# Patient Record
Sex: FEMALE | Race: WHITE | Employment: UNEMPLOYED | URBAN - METROPOLITAN AREA
[De-identification: names, ages, dates, MRNs, and addresses within clinical notes are randomized per-mention and may not be internally consistent; named-entity substitution may affect disease eponyms.]

---

## 2017-05-24 ENCOUNTER — APPOINTMENT (OUTPATIENT)
Dept: GENERAL RADIOLOGY | Age: 62
End: 2017-05-24

## 2017-05-24 ENCOUNTER — HOSPITAL ENCOUNTER (EMERGENCY)
Age: 62
Discharge: HOME | End: 2017-05-24
Attending: EMERGENCY MEDICINE

## 2017-05-24 VITALS
HEART RATE: 70 BPM | DIASTOLIC BLOOD PRESSURE: 93 MMHG | HEIGHT: 66 IN | SYSTOLIC BLOOD PRESSURE: 202 MMHG | RESPIRATION RATE: 18 BRPM | OXYGEN SATURATION: 98 % | WEIGHT: 195 LBS | BODY MASS INDEX: 31.34 KG/M2 | TEMPERATURE: 98.5 F

## 2017-05-24 DIAGNOSIS — S29.011A CHEST WALL MUSCLE STRAIN, INITIAL ENCOUNTER: Primary | ICD-10-CM

## 2017-05-24 PROCEDURE — 71020 XR CHEST STANDARD TWO VW: CPT

## 2017-05-24 PROCEDURE — 99285 EMERGENCY DEPT VISIT HI MDM: CPT

## 2017-05-24 PROCEDURE — 6370000000 HC RX 637 (ALT 250 FOR IP): Performed by: EMERGENCY MEDICINE

## 2017-05-24 RX ORDER — ASPIRIN 325 MG
325 TABLET ORAL DAILY
COMMUNITY

## 2017-05-24 RX ORDER — AMLODIPINE BESYLATE 5 MG/1
5 TABLET ORAL DAILY
COMMUNITY

## 2017-05-24 RX ORDER — OXYCODONE HYDROCHLORIDE AND ACETAMINOPHEN 5; 325 MG/1; MG/1
2 TABLET ORAL EVERY 4 HOURS PRN
Status: DISCONTINUED | OUTPATIENT
Start: 2017-05-24 | End: 2017-05-24 | Stop reason: HOSPADM

## 2017-05-24 RX ORDER — BISOPROLOL FUMARATE AND HYDROCHLOROTHIAZIDE 5; 6.25 MG/1; MG/1
1 TABLET ORAL DAILY
COMMUNITY

## 2017-05-24 RX ORDER — HYDROCODONE BITARTRATE AND ACETAMINOPHEN 5; 325 MG/1; MG/1
1 TABLET ORAL EVERY 6 HOURS PRN
Qty: 8 TABLET | Refills: 0 | Status: SHIPPED | OUTPATIENT
Start: 2017-05-24 | End: 2017-05-31

## 2017-05-24 RX ADMIN — OXYCODONE HYDROCHLORIDE AND ACETAMINOPHEN 2 TABLET: 5; 325 TABLET ORAL at 03:10

## 2017-05-24 ASSESSMENT — ENCOUNTER SYMPTOMS
BLOOD IN STOOL: 0
DIARRHEA: 0
SHORTNESS OF BREATH: 0
BACK PAIN: 0
ABDOMINAL PAIN: 0
RHINORRHEA: 0
VOMITING: 0
CHEST TIGHTNESS: 0
EYE PAIN: 0
NAUSEA: 0
COUGH: 0
WHEEZING: 0
TROUBLE SWALLOWING: 0

## 2017-05-24 ASSESSMENT — PAIN SCALES - GENERAL
PAINLEVEL_OUTOF10: 6
PAINLEVEL_OUTOF10: 0
PAINLEVEL_OUTOF10: 6

## 2017-05-24 ASSESSMENT — PAIN DESCRIPTION - LOCATION: LOCATION: RIB CAGE

## 2017-05-24 ASSESSMENT — PAIN DESCRIPTION - ORIENTATION: ORIENTATION: LEFT

## 2017-05-24 ASSESSMENT — PAIN DESCRIPTION - PAIN TYPE: TYPE: ACUTE PAIN

## 2017-10-29 ENCOUNTER — HOSPITAL ENCOUNTER (EMERGENCY)
Age: 62
Discharge: HOME | End: 2017-10-29
Attending: EMERGENCY MEDICINE

## 2017-10-29 VITALS
BODY MASS INDEX: 36.15 KG/M2 | HEART RATE: 74 BPM | WEIGHT: 217 LBS | TEMPERATURE: 98.2 F | HEIGHT: 65 IN | RESPIRATION RATE: 16 BRPM | DIASTOLIC BLOOD PRESSURE: 95 MMHG | SYSTOLIC BLOOD PRESSURE: 213 MMHG | OXYGEN SATURATION: 96 %

## 2017-10-29 DIAGNOSIS — H60.391 INFECTIVE OTITIS EXTERNA OF RIGHT EAR: Primary | ICD-10-CM

## 2017-10-29 DIAGNOSIS — I10 ESSENTIAL HYPERTENSION: ICD-10-CM

## 2017-10-29 PROCEDURE — 99282 EMERGENCY DEPT VISIT SF MDM: CPT

## 2017-10-29 RX ORDER — AMOXICILLIN AND CLAVULANATE POTASSIUM 875; 125 MG/1; MG/1
1 TABLET, FILM COATED ORAL 2 TIMES DAILY
Qty: 20 TABLET | Refills: 0 | Status: SHIPPED | OUTPATIENT
Start: 2017-10-29 | End: 2017-11-08

## 2017-10-29 RX ORDER — CIPROFLOXACIN AND DEXAMETHASONE 3; 1 MG/ML; MG/ML
4 SUSPENSION/ DROPS AURICULAR (OTIC) 2 TIMES DAILY
Qty: 1 BOTTLE | Refills: 0 | Status: SHIPPED | OUTPATIENT
Start: 2017-10-29 | End: 2017-11-05

## 2017-10-29 RX ORDER — HYDROCODONE BITARTRATE AND ACETAMINOPHEN 5; 325 MG/1; MG/1
1 TABLET ORAL EVERY 6 HOURS PRN
Qty: 10 TABLET | Refills: 0 | Status: SHIPPED | OUTPATIENT
Start: 2017-10-29 | End: 2017-11-05

## 2017-10-29 ASSESSMENT — ENCOUNTER SYMPTOMS
ABDOMINAL PAIN: 0
STRIDOR: 0
SHORTNESS OF BREATH: 0
CHOKING: 0
CHEST TIGHTNESS: 0
EYE PAIN: 0
VOMITING: 0
BACK PAIN: 0
EYE DISCHARGE: 0
TROUBLE SWALLOWING: 0
EYE REDNESS: 0
SORE THROAT: 0
DIARRHEA: 0
VOICE CHANGE: 0
CONSTIPATION: 0
FACIAL SWELLING: 0
BLOOD IN STOOL: 0
COUGH: 0
WHEEZING: 0
SINUS PRESSURE: 0

## 2017-10-29 ASSESSMENT — PAIN DESCRIPTION - LOCATION: LOCATION: EAR

## 2017-10-29 ASSESSMENT — PAIN SCALES - GENERAL: PAINLEVEL_OUTOF10: 3

## 2017-10-29 ASSESSMENT — PAIN DESCRIPTION - ORIENTATION: ORIENTATION: RIGHT

## 2017-10-29 ASSESSMENT — PAIN DESCRIPTION - DESCRIPTORS: DESCRIPTORS: SHARP

## 2017-10-29 ASSESSMENT — PAIN DESCRIPTION - PAIN TYPE: TYPE: ACUTE PAIN

## 2017-10-29 NOTE — ED PROVIDER NOTES
drainage noted hearing is normal   Eyes: Conjunctivae and EOM are normal. Pupils are equal, round, and reactive to light. Right eye exhibits no discharge. Left eye exhibits no discharge. Neck: Normal range of motion. Neck supple. No JVD present. No tracheal deviation present. No thyromegaly present. Lymphadenopathy:     She has no cervical adenopathy. DIAGNOSTIC RESULTS     EKG: All EKG's are interpreted by the Emergency Department Physician who either signs or Co-signs this chart in the absence of a cardiologist.        RADIOLOGY:   Non-plain film images such as CT, Ultrasound and MRI are read by the radiologist. Plain radiographic images are visualized and preliminarily interpreted by the emergency physician with the below findings:        Interpretation per the Radiologist below, if available at the time of this note:    No orders to display         ED BEDSIDE ULTRASOUND:   Performed by ED Physician - none    LABS:  Labs Reviewed - No data to display    All other labs were within normal range or not returned as of this dictation. EMERGENCY DEPARTMENT COURSE and DIFFERENTIAL DIAGNOSIS/MDM:   Vitals:    Vitals:    10/29/17 1748   BP: (!) 213/95   Pulse: 74   Resp: 16   Temp: 98.2 °F (36.8 °C)   TempSrc: Oral   SpO2: 96%   Weight: 217 lb (98.4 kg)   Height: 5' 5\" (1.651 m)           MDM    CRITICAL CARE TIME   Total Critical Care time was  minutes, excluding separately reportable procedures. There was a high probability of clinically significant/life threatening deterioration in the patient's condition which required my urgent intervention. SULTS:  None    PROCEDURES:  Unless otherwise noted below, none     Procedures    FINAL IMPRESSION      1. Infective otitis externa of right ear    2.  Essential hypertension          DISPOSITION/PLAN   DISPOSITION Decision to Discharge    PATIENT REFERRED TO:  Zayra Baldwin MD  [address] [place] [address]  [phone]    In 1 week        DISCHARGE MEDICATIONS:  New Prescriptions    AMOXICILLIN-CLAVULANATE (AUGMENTIN) 875-125 MG PER TABLET    Take 1 tablet by mouth 2 times daily for 10 days    CIPROFLOXACIN-DEXAMETHASONE (CIPRODEX) 0.3-0.1 % OTIC SUSPENSION    Place 4 drops into the right ear 2 times daily for 7 days    HYDROCODONE-ACETAMINOPHEN (NORCO) 5-325 MG PER TABLET    Take 1 tablet by mouth every 6 hours as needed for Pain .           (Please note that portions of this note were completed with a voice recognition program.  Efforts were made to edit the dictations but occasionally words are mis-transcribed.)    Nereida Arellano MD (electronically signed)  Attending Emergency Physician       Nereida Arellano MD  10/29/17 4820

## 2021-04-22 ENCOUNTER — OFFICE VISIT (OUTPATIENT)
Dept: INTERNAL MEDICINE | Age: 66
End: 2021-04-22

## 2021-04-22 VITALS
TEMPERATURE: 97.4 F | RESPIRATION RATE: 18 BRPM | HEART RATE: 76 BPM | DIASTOLIC BLOOD PRESSURE: 68 MMHG | OXYGEN SATURATION: 95 % | BODY MASS INDEX: 33.46 KG/M2 | SYSTOLIC BLOOD PRESSURE: 110 MMHG | HEIGHT: 66 IN | WEIGHT: 208.2 LBS

## 2021-04-22 DIAGNOSIS — J02.9 SORE THROAT: ICD-10-CM

## 2021-04-22 DIAGNOSIS — Z20.822 SUSPECTED COVID-19 VIRUS INFECTION: Primary | ICD-10-CM

## 2021-04-22 DIAGNOSIS — Z20.822 SUSPECTED COVID-19 VIRUS INFECTION: ICD-10-CM

## 2021-04-22 LAB — S PYO AG THROAT QL: NORMAL

## 2021-04-22 PROCEDURE — 87880 STREP A ASSAY W/OPTIC: CPT | Performed by: NURSE PRACTITIONER

## 2021-04-22 PROCEDURE — 99202 OFFICE O/P NEW SF 15 MIN: CPT | Performed by: NURSE PRACTITIONER

## 2021-04-22 SDOH — ECONOMIC STABILITY: TRANSPORTATION INSECURITY
IN THE PAST 12 MONTHS, HAS LACK OF TRANSPORTATION KEPT YOU FROM MEETINGS, WORK, OR FROM GETTING THINGS NEEDED FOR DAILY LIVING?: NOT ASKED

## 2021-04-22 ASSESSMENT — ENCOUNTER SYMPTOMS
VOMITING: 0
SINUS PRESSURE: 0
ABDOMINAL PAIN: 0
COUGH: 1
SHORTNESS OF BREATH: 0
SINUS PAIN: 0
SORE THROAT: 1
NAUSEA: 0
WHEEZING: 0
DIARRHEA: 0
RHINORRHEA: 0

## 2021-04-22 ASSESSMENT — PATIENT HEALTH QUESTIONNAIRE - PHQ9
2. FEELING DOWN, DEPRESSED OR HOPELESS: 1
SUM OF ALL RESPONSES TO PHQ QUESTIONS 1-9: 1
SUM OF ALL RESPONSES TO PHQ QUESTIONS 1-9: 1
1. LITTLE INTEREST OR PLEASURE IN DOING THINGS: 0
SUM OF ALL RESPONSES TO PHQ QUESTIONS 1-9: 1

## 2021-04-22 NOTE — PROGRESS NOTES
SpO2 95%   BMI 33.60 kg/m²     Physical Exam  Vitals signs reviewed. Constitutional:       General: She is not in acute distress. Appearance: She is well-developed. She is not ill-appearing. HENT:      Head: Normocephalic. Right Ear: Tympanic membrane, ear canal and external ear normal.      Left Ear: Tympanic membrane, ear canal and external ear normal.      Nose: Nose normal.      Mouth/Throat:      Lips: Pink. Mouth: Mucous membranes are moist.      Pharynx: Oropharynx is clear. Neck:      Musculoskeletal: Normal range of motion. Cardiovascular:      Rate and Rhythm: Normal rate and regular rhythm. Heart sounds: Normal heart sounds. Pulmonary:      Effort: Pulmonary effort is normal. No respiratory distress. Breath sounds: Normal breath sounds. No decreased breath sounds or wheezing. Musculoskeletal: Normal range of motion. Lymphadenopathy:      Cervical: No cervical adenopathy. Skin:     General: Skin is warm and dry. Capillary Refill: Capillary refill takes less than 2 seconds. Neurological:      Mental Status: She is alert and oriented to person, place, and time. Assessment:       Diagnosis Orders   1. Suspected COVID-19 virus infection  COVID-19 Ambulatory   2. Sore throat  POCT rapid strep A    Culture, Throat         Plan:      Orders Placed This Encounter   Procedures    Culture, Throat     Standing Status:   Future     Standing Expiration Date:   4/22/2022    COVID-19 Ambulatory     Standing Status:   Future     Standing Expiration Date:   4/22/2022     Scheduling Instructions:      Saline media preferred given current shortage of viral transport media but both acceptable     Order Specific Question:   Is this test for diagnosis or screening? Answer:   Diagnosis of ill patient     Order Specific Question:   Symptomatic for COVID-19 as defined by CDC?      Answer:   Yes     Order Specific Question:   Date of Symptom Onset     Answer:   4/19/2021

## 2021-04-22 NOTE — PATIENT INSTRUCTIONS
Patient Education        Sore Throat: Care Instructions  Your Care Instructions     Infection by bacteria or a virus causes most sore throats. Cigarette smoke, dry air, air pollution, allergies, and yelling can also cause a sore throat. Sore throats can be painful and annoying. Fortunately, most sore throats go away on their own. If you have a bacterial infection, your doctor may prescribe antibiotics. Follow-up care is a key part of your treatment and safety. Be sure to make and go to all appointments, and call your doctor if you are having problems. It's also a good idea to know your test results and keep a list of the medicines you take. How can you care for yourself at home? · If your doctor prescribed antibiotics, take them as directed. Do not stop taking them just because you feel better. You need to take the full course of antibiotics. · Gargle with warm salt water once an hour to help reduce swelling and relieve discomfort. Use 1 teaspoon of salt mixed in 1 cup of warm water. · Take an over-the-counter pain medicine, such as acetaminophen (Tylenol), ibuprofen (Advil, Motrin), or naproxen (Aleve). Read and follow all instructions on the label. · Be careful when taking over-the-counter cold or flu medicines and Tylenol at the same time. Many of these medicines have acetaminophen, which is Tylenol. Read the labels to make sure that you are not taking more than the recommended dose. Too much acetaminophen (Tylenol) can be harmful. · Drink plenty of fluids. Fluids may help soothe an irritated throat. Hot fluids, such as tea or soup, may help decrease throat pain. · Use over-the-counter throat lozenges to soothe pain. Regular cough drops or hard candy may also help. These should not be given to young children because of the risk of choking. · Do not smoke or allow others to smoke around you. If you need help quitting, talk to your doctor about stop-smoking programs and medicines.  These can increase your chances of quitting for good. · Use a vaporizer or humidifier to add moisture to your bedroom. Follow the directions for cleaning the machine. When should you call for help? Call your doctor now or seek immediate medical care if:    · You have new or worse trouble swallowing.     · Your sore throat gets much worse on one side. Watch closely for changes in your health, and be sure to contact your doctor if you do not get better as expected. Where can you learn more? Go to https://[employer] Research. org and sign in to your [employer]Go account. Enter G556 in the Connectbright box to learn more about \"Sore Throat: Care Instructions. \"     If you do not have an account, please click on the \"Sign Up Now\" link. Current as of: December 2, 2020               Content Version: 12.8  © 2645-3026 Healthwise, Incorporated. Care instructions adapted under license by [facility] ([facility]). If you have questions about a medical condition or this instruction, always ask your healthcare professional. [place] [address] any warranty or liability for your use of this information.

## 2021-04-24 LAB
SARS-COV-2: DETECTED
SOURCE: ABNORMAL

## 2021-04-25 LAB — THROAT CULTURE: NORMAL

## 2021-04-26 ENCOUNTER — TELEPHONE (OUTPATIENT)
Dept: INTERNAL MEDICINE | Age: 66
End: 2021-04-26

## 2021-07-08 ENCOUNTER — HOSPITAL ENCOUNTER (EMERGENCY)
Age: 66
Discharge: HOME | End: 2021-07-08
Attending: EMERGENCY MEDICINE
Payer: MEDICARE

## 2021-07-08 ENCOUNTER — APPOINTMENT (OUTPATIENT)
Dept: GENERAL RADIOLOGY | Age: 66
End: 2021-07-08
Payer: MEDICARE

## 2021-07-08 VITALS
TEMPERATURE: 98.1 F | DIASTOLIC BLOOD PRESSURE: 79 MMHG | BODY MASS INDEX: 33.43 KG/M2 | RESPIRATION RATE: 20 BRPM | HEART RATE: 63 BPM | OXYGEN SATURATION: 97 % | HEIGHT: 66 IN | WEIGHT: 208 LBS | SYSTOLIC BLOOD PRESSURE: 174 MMHG

## 2021-07-08 DIAGNOSIS — M46.1 SACROILIITIS (HCC): Primary | ICD-10-CM

## 2021-07-08 PROCEDURE — 72110 X-RAY EXAM L-2 SPINE 4/>VWS: CPT

## 2021-07-08 PROCEDURE — 99283 EMERGENCY DEPT VISIT LOW MDM: CPT

## 2021-07-08 RX ORDER — CYCLOBENZAPRINE HCL 10 MG
10 TABLET ORAL 3 TIMES DAILY PRN
Qty: 15 TABLET | Refills: 0 | Status: SHIPPED | OUTPATIENT
Start: 2021-07-08 | End: 2021-07-18

## 2021-07-08 RX ORDER — NAPROXEN 500 MG/1
500 TABLET ORAL 2 TIMES DAILY
Qty: 10 TABLET | Refills: 0 | Status: SHIPPED | OUTPATIENT
Start: 2021-07-08 | End: 2021-10-24

## 2021-07-08 ASSESSMENT — PAIN SCALES - GENERAL: PAINLEVEL_OUTOF10: 1

## 2021-07-08 ASSESSMENT — PAIN DESCRIPTION - FREQUENCY: FREQUENCY: CONTINUOUS

## 2021-07-08 ASSESSMENT — PAIN DESCRIPTION - ORIENTATION: ORIENTATION: RIGHT

## 2021-07-08 ASSESSMENT — PAIN DESCRIPTION - PAIN TYPE: TYPE: ACUTE PAIN

## 2021-07-08 ASSESSMENT — PAIN DESCRIPTION - LOCATION: LOCATION: HIP

## 2021-07-08 ASSESSMENT — PAIN DESCRIPTION - DESCRIPTORS: DESCRIPTORS: ACHING

## 2021-07-08 NOTE — ED TRIAGE NOTES
Patient presents to ED with c/o right hip pain that started 3 weeks ago after moving a mattress.  She reports pain currently 1/10

## 2021-07-08 NOTE — ED PROVIDER NOTES
CC/HPI: 71-year-old female to the emergency department with chief complaint of right hip and lower back discomfort. Patient states it began 3 weeks ago. Patient states she was moving a mattress and felt a sudden pull or pop in that area. Patient states it is bothered her since with certain types of movements and with walking. No numbness or tingling no radicular symptoms no changes to bowel movements or bladder function. Patient states approximately 3 days ago she slipped and fell onto her knees and the discomfort has been worse. VITALS/PMH/PSH: Reviewed per nurses notes    REVIEW OF SYSTEMS: As in chief complaint history of present illness, otherwise all other systems are reviewed and negative the total 10 systems reviewed    PHYSICAL EXAM:  GEN: Pt alert and oriented, no acute distress  HEENT:         Normocephalic/Atramatic        PERRL, EOMI  NECK: Nontender, no signs of trauma, no lymphadenopathy  HEART: Reg S1/S2, without murmer, rub or gallop  LUNGS: Clear to auscultation bilaterally, respirations even and unlabored  ABDOMEN: soft, nondistended. Non-tender to palpation. MUSCULOSKELETAL/EXTREMITITES:  No signs of trauma, cyanosis or edema. No CVA tenderness  Examination of the lower back shows no redness warmth or signs of infection. There is no midline tenderness noted throughout the lumbar spine into the sacrum. Patient with tenderness which appears to be mild over the left sacroiliac and sciatic areas. Patient neurovascularly intact distally. No calf swelling or tenderness. LYMPH: no peripheral lympadenopathy noted  SKIN:  Warm & dry, no rash  NEUROLOGIC:  Alert and oriented. Speech clear    Medical decision making/ED course;  Patient main stable throughout the course of her emergency department stay. X-rays were obtained and interpreted by me as showing some degenerative changes however no signs of acute bony abnormality throughout the lumbar spine.     Clinical impression;  1)

## 2021-10-24 ENCOUNTER — OFFICE VISIT (OUTPATIENT)
Dept: INTERNAL MEDICINE | Age: 66
End: 2021-10-24
Payer: MEDICARE

## 2021-10-24 VITALS
HEART RATE: 65 BPM | SYSTOLIC BLOOD PRESSURE: 120 MMHG | DIASTOLIC BLOOD PRESSURE: 80 MMHG | WEIGHT: 211 LBS | BODY MASS INDEX: 33.91 KG/M2 | TEMPERATURE: 97.1 F | OXYGEN SATURATION: 97 % | HEIGHT: 66 IN

## 2021-10-24 DIAGNOSIS — H61.23 BILATERAL IMPACTED CERUMEN: ICD-10-CM

## 2021-10-24 DIAGNOSIS — H65.93 FLUID LEVEL BEHIND TYMPANIC MEMBRANE OF BOTH EARS: Primary | ICD-10-CM

## 2021-10-24 PROCEDURE — 1123F ACP DISCUSS/DSCN MKR DOCD: CPT | Performed by: NURSE PRACTITIONER

## 2021-10-24 PROCEDURE — 69209 REMOVE IMPACTED EAR WAX UNI: CPT | Performed by: NURSE PRACTITIONER

## 2021-10-24 PROCEDURE — 4040F PNEUMOC VAC/ADMIN/RCVD: CPT | Performed by: NURSE PRACTITIONER

## 2021-10-24 PROCEDURE — G8427 DOCREV CUR MEDS BY ELIG CLIN: HCPCS | Performed by: NURSE PRACTITIONER

## 2021-10-24 PROCEDURE — G8400 PT W/DXA NO RESULTS DOC: HCPCS | Performed by: NURSE PRACTITIONER

## 2021-10-24 PROCEDURE — 1036F TOBACCO NON-USER: CPT | Performed by: NURSE PRACTITIONER

## 2021-10-24 PROCEDURE — 99213 OFFICE O/P EST LOW 20 MIN: CPT | Performed by: NURSE PRACTITIONER

## 2021-10-24 PROCEDURE — 3017F COLORECTAL CA SCREEN DOC REV: CPT | Performed by: NURSE PRACTITIONER

## 2021-10-24 PROCEDURE — G8484 FLU IMMUNIZE NO ADMIN: HCPCS | Performed by: NURSE PRACTITIONER

## 2021-10-24 PROCEDURE — 1090F PRES/ABSN URINE INCON ASSESS: CPT | Performed by: NURSE PRACTITIONER

## 2021-10-24 PROCEDURE — G8417 CALC BMI ABV UP PARAM F/U: HCPCS | Performed by: NURSE PRACTITIONER

## 2021-10-24 RX ORDER — FLUTICASONE PROPIONATE 50 MCG
1 SPRAY, SUSPENSION (ML) NASAL DAILY
Qty: 1 EACH | Refills: 0 | Status: SHIPPED | OUTPATIENT
Start: 2021-10-24

## 2021-10-24 ASSESSMENT — ENCOUNTER SYMPTOMS
TROUBLE SWALLOWING: 0
VOMITING: 0
COUGH: 0
SINUS PRESSURE: 1
SORE THROAT: 0
WHEEZING: 0
NAUSEA: 0
SHORTNESS OF BREATH: 0
DIARRHEA: 0

## 2021-10-24 NOTE — PROGRESS NOTES
Angel Cross (:  1955) is a 73 y.o. female, Established patient, here for evaluation of the following chief complaint(s):  Otalgia (right ear pain, pt states she is having nerve pain, went to dentist last week and xray revealed no abnormalities, pt would like to make sure there is no infection, no loss of balance, no loss of hearing, experiencing this issue on and off for 5 years, went to see an ENT and was told it was inflammation, pt feels a lump under the skin in her ear right before the ear canal.  )      Vitals:    10/24/21 1018   BP: 120/80   Pulse: 65   Temp: 97.1 °F (36.2 °C)   SpO2: 97%       ASSESSMENT/PLAN:  1. Fluid level behind tympanic membrane of both ears  -     fluticasone (FLONASE) 50 MCG/ACT nasal spray; 1 spray by Nasal route daily, Disp-1 each, R-0Normal  2. Bilateral impacted cerumen  -     TN REMOVAL IMPACTED CERUMEN IRRIGATION/LVG UNILAT      Return if symptoms worsen or fail to improve. SUBJECTIVE/OBJECTIVE:    Otalgia   There is pain in the right ear. This is a new problem. The current episode started in the past 7 days. The problem occurs constantly. The problem has been unchanged. There has been no fever. The pain is at a severity of 3/10. Pertinent negatives include no coughing, diarrhea, headaches, sore throat or vomiting. She has tried nothing for the symptoms. Review of Systems   Constitutional: Negative for chills, fatigue and fever. HENT: Positive for congestion, ear pain (right) and sinus pressure (maxillary). Negative for sore throat and trouble swallowing. Respiratory: Negative for cough, shortness of breath and wheezing. Cardiovascular: Negative for chest pain and palpitations. Gastrointestinal: Negative for diarrhea, nausea and vomiting. Neurological: Negative for dizziness, light-headedness and headaches. Physical Exam  Constitutional:       General: She is not in acute distress. Appearance: Normal appearance.    HENT: Right Ear: A middle ear effusion is present. There is impacted cerumen. Tympanic membrane is not erythematous. Left Ear: A middle ear effusion is present. There is impacted cerumen. Tympanic membrane is not erythematous. Ears:        Comments: Bilateral cerumen debris removed using lighted curette. No lump noted inside the ear canal.  The \"lump\" is at the opening of the right ear canal and appears like the cartilage?/nerve?, that shapes the canal opening, is slightly raised above the floor of the opening (approx 7o'clock) in the ear canal. Linear in appearance, no erythema or compromise of the skins integrity. (pt has f/u with ENT next month)     Nose: Mucosal edema present. Right Turbinates: Swollen. Left Turbinates: Swollen. Right Sinus: No maxillary sinus tenderness or frontal sinus tenderness. Left Sinus: No maxillary sinus tenderness or frontal sinus tenderness. Mouth/Throat:      Lips: Pink. Mouth: Mucous membranes are moist.   Cardiovascular:      Rate and Rhythm: Normal rate and regular rhythm. Heart sounds: Normal heart sounds, S1 normal and S2 normal.   Pulmonary:      Effort: Pulmonary effort is normal. No respiratory distress. Breath sounds: Normal air entry. No decreased breath sounds, wheezing, rhonchi or rales. Abdominal:      Palpations: Abdomen is soft. Tenderness: There is no abdominal tenderness. Skin:     General: Skin is warm and dry. Neurological:      Mental Status: She is alert and oriented to person, place, and time. Psychiatric:         Mood and Affect: Mood normal.         Behavior: Behavior is cooperative. An electronic signature was used to authenticate this note.     --CAITLYN Lauren

## 2021-10-24 NOTE — PATIENT INSTRUCTIONS
Patient Education        Middle Ear Fluid: Care Instructions  Your Care Instructions     Fluid often builds up inside the ear during a cold or allergies. Usually the fluid drains away, but sometimes a small tube in the ear, called the eustachian tube, stays blocked for months. Symptoms of fluid buildup may include:  · Popping, ringing, or a feeling of fullness or pressure in the ear. · Trouble hearing. · Balance problems and dizziness. In most cases, you can treat yourself at home. Follow-up care is a key part of your treatment and safety. Be sure to make and go to all appointments, and call your doctor if you are having problems. It's also a good idea to know your test results and keep a list of the medicines you take. How can you care for yourself at home? · In most cases, the fluid clears up within a few months without treatment. You may need more tests if the fluid does not clear up after 3 months. · If your doctor prescribed antibiotics, take them as directed. Do not stop taking them just because you feel better. You need to take the full course of antibiotics. When should you call for help? Call your doctor now or seek immediate medical care if:    · You have symptoms of infection, such as:  ? Increased pain, swelling, warmth, or redness. ? Pus draining from the area. ? A fever. Watch closely for changes in your health, and be sure to contact your doctor if:    · You notice changes in hearing.     · You do not get better as expected. Where can you learn more? Go to https://Blue ApronpeMetooo.Sensitive Object. org and sign in to your [employer] account. Enter Q676 in the [facility] box to learn more about \"Middle Ear Fluid: Care Instructions. \"     If you do not have an account, please click on the \"Sign Up Now\" link. Current as of: December 2, 2020               Content Version: 13.0  © 5856-2874 Healthwise, Incorporated. Care instructions adapted under license by [facility] ([facility]).  If you have questions about a medical condition or this instruction, always ask your healthcare professional. [place] [address] any warranty or liability for your use of this information.

## 2022-05-11 ENCOUNTER — OFFICE VISIT (OUTPATIENT)
Dept: INTERNAL MEDICINE | Age: 67
End: 2022-05-11
Payer: MEDICARE

## 2022-05-11 VITALS
WEIGHT: 196 LBS | OXYGEN SATURATION: 98 % | HEART RATE: 84 BPM | BODY MASS INDEX: 31.5 KG/M2 | SYSTOLIC BLOOD PRESSURE: 122 MMHG | DIASTOLIC BLOOD PRESSURE: 68 MMHG | HEIGHT: 66 IN

## 2022-05-11 DIAGNOSIS — T14.8XXA PUNCTURE WOUND: Primary | ICD-10-CM

## 2022-05-11 PROCEDURE — 99213 OFFICE O/P EST LOW 20 MIN: CPT | Performed by: NURSE PRACTITIONER

## 2022-05-11 PROCEDURE — 1090F PRES/ABSN URINE INCON ASSESS: CPT | Performed by: NURSE PRACTITIONER

## 2022-05-11 PROCEDURE — 1036F TOBACCO NON-USER: CPT | Performed by: NURSE PRACTITIONER

## 2022-05-11 PROCEDURE — 3017F COLORECTAL CA SCREEN DOC REV: CPT | Performed by: NURSE PRACTITIONER

## 2022-05-11 PROCEDURE — G8400 PT W/DXA NO RESULTS DOC: HCPCS | Performed by: NURSE PRACTITIONER

## 2022-05-11 PROCEDURE — G8417 CALC BMI ABV UP PARAM F/U: HCPCS | Performed by: NURSE PRACTITIONER

## 2022-05-11 PROCEDURE — 4040F PNEUMOC VAC/ADMIN/RCVD: CPT | Performed by: NURSE PRACTITIONER

## 2022-05-11 PROCEDURE — G8427 DOCREV CUR MEDS BY ELIG CLIN: HCPCS | Performed by: NURSE PRACTITIONER

## 2022-05-11 PROCEDURE — 1123F ACP DISCUSS/DSCN MKR DOCD: CPT | Performed by: NURSE PRACTITIONER

## 2022-05-11 RX ORDER — OXCARBAZEPINE 150 MG/1
TABLET, FILM COATED ORAL
COMMUNITY
Start: 2022-04-28

## 2022-05-11 SDOH — ECONOMIC STABILITY: FOOD INSECURITY: WITHIN THE PAST 12 MONTHS, THE FOOD YOU BOUGHT JUST DIDN'T LAST AND YOU DIDN'T HAVE MONEY TO GET MORE.: NEVER TRUE

## 2022-05-11 SDOH — ECONOMIC STABILITY: FOOD INSECURITY: WITHIN THE PAST 12 MONTHS, YOU WORRIED THAT YOUR FOOD WOULD RUN OUT BEFORE YOU GOT MONEY TO BUY MORE.: NEVER TRUE

## 2022-05-11 ASSESSMENT — PATIENT HEALTH QUESTIONNAIRE - PHQ9
4. FEELING TIRED OR HAVING LITTLE ENERGY: 0
SUM OF ALL RESPONSES TO PHQ9 QUESTIONS 1 & 2: 2
8. MOVING OR SPEAKING SO SLOWLY THAT OTHER PEOPLE COULD HAVE NOTICED. OR THE OPPOSITE, BEING SO FIGETY OR RESTLESS THAT YOU HAVE BEEN MOVING AROUND A LOT MORE THAN USUAL: 0
9. THOUGHTS THAT YOU WOULD BE BETTER OFF DEAD, OR OF HURTING YOURSELF: 0
SUM OF ALL RESPONSES TO PHQ QUESTIONS 1-9: 2
10. IF YOU CHECKED OFF ANY PROBLEMS, HOW DIFFICULT HAVE THESE PROBLEMS MADE IT FOR YOU TO DO YOUR WORK, TAKE CARE OF THINGS AT HOME, OR GET ALONG WITH OTHER PEOPLE: 0
SUM OF ALL RESPONSES TO PHQ QUESTIONS 1-9: 2
6. FEELING BAD ABOUT YOURSELF - OR THAT YOU ARE A FAILURE OR HAVE LET YOURSELF OR YOUR FAMILY DOWN: 0
1. LITTLE INTEREST OR PLEASURE IN DOING THINGS: 0
5. POOR APPETITE OR OVEREATING: 0
3. TROUBLE FALLING OR STAYING ASLEEP: 0
7. TROUBLE CONCENTRATING ON THINGS, SUCH AS READING THE NEWSPAPER OR WATCHING TELEVISION: 0
2. FEELING DOWN, DEPRESSED OR HOPELESS: 2

## 2022-05-11 ASSESSMENT — ENCOUNTER SYMPTOMS
COUGH: 0
WHEEZING: 0
SHORTNESS OF BREATH: 0

## 2022-05-11 ASSESSMENT — SOCIAL DETERMINANTS OF HEALTH (SDOH): HOW HARD IS IT FOR YOU TO PAY FOR THE VERY BASICS LIKE FOOD, HOUSING, MEDICAL CARE, AND HEATING?: NOT HARD AT ALL

## 2022-05-11 NOTE — PROGRESS NOTES
Subjective:      Patient ID: Silke Rich is a 78 y.o. female who presents today for:  Chief Complaint   Patient presents with    Finger Injury     got staple caught in her rt first finger       Patient presents to the office after a small puncture/cut by a staple. Patient states it slightly punctured her finger and then scraped when she pulled away. Laceration   The incident occurred 1 to 3 hours ago. Pain location: right first finger. The laceration is 2 cm in size. Injury mechanism: staple. The patient is experiencing no pain. She reports no foreign bodies present. Her tetanus status is out of date (patient declined tetanus booster). Past Medical History:   Diagnosis Date    Hypertension      Past Surgical History:   Procedure Laterality Date    HYSTERECTOMY      TONSILLECTOMY       History reviewed. No pertinent family history. Allergies   Allergen Reactions    Bactrim [Sulfamethoxazole-Trimethoprim]     Seasonal      SNEEZING         Review of Systems   Constitutional: Negative for chills, fatigue and fever. Respiratory: Negative for cough, shortness of breath and wheezing. Cardiovascular: Negative for chest pain. Skin: Positive for wound. Negative for rash. Objective:   /68 (Site: Left Upper Arm, Position: Sitting, Cuff Size: Large Adult)   Pulse 84   Ht 5' 6\" (1.676 m) Comment: PER PT\  Wt 196 lb (88.9 kg)   LMP  (LMP Unknown)   SpO2 98%   BMI 31.64 kg/m²     Physical Exam  Vitals reviewed. Constitutional:       General: She is not in acute distress. Appearance: She is well-developed. She is not ill-appearing. HENT:      Head: Normocephalic. Cardiovascular:      Rate and Rhythm: Normal rate and regular rhythm. Heart sounds: Normal heart sounds. Pulmonary:      Effort: Pulmonary effort is normal. No respiratory distress. Breath sounds: Normal breath sounds. Musculoskeletal:         General: Normal range of motion. Hands:    Skin:     General: Skin is warm and dry. Capillary Refill: Capillary refill takes less than 2 seconds. Findings: Wound present. Neurological:      Mental Status: She is alert and oriented to person, place, and time. Assessment:       Diagnosis Orders   1. Puncture wound           Plan:      Superficial wound, cleansed and visualized for foreign bodies. Reviewed wound care and signs of infection requiring follow up. Tetanus booster declined. Reviewed symptoms requiring ER. Patient verbalizes understanding. I have reviewed and updated the electronic medical record. Return if symptoms worsen or fail to improve, for follow up with PCP.     CAITLYN Balbuena - NP

## 2022-05-11 NOTE — PATIENT INSTRUCTIONS
Patient Education        Puncture Wounds: Care Instructions  Overview     A puncture wound can happen anywhere on your body. These wounds tend to benarrower and deeper than cuts. A puncture wound is usually left open instead of being closed. This is because a puncture wound can be easily infected, and closing it can make infection evenmore likely. You will probably have a bandage over the wound. The doctor has checked you carefully, but problems can develop later. If you notice any problems or new symptoms, get medical treatment right away. Follow-up care is a key part of your treatment and safety. Be sure to make and go to all appointments, and call your doctor if you are having problems. It's also a good idea to know your test results and keep alist of the medicines you take. How can you care for yourself at home?  Keep the wound dry for the first 24 to 48 hours. After this, you can shower if your doctor okays it. Pat the wound dry.  Don't soak the wound, such as in a bathtub. Your doctor will tell you when it's safe to get the wound wet.  If your doctor told you how to care for your wound, follow your doctor's instructions. If you did not get instructions, follow this general advice:  ? After the first 24 to 48 hours, wash the wound with clean water 2 times a day. Don't use hydrogen peroxide or alcohol, which can slow healing. ? You may cover the wound with a thin layer of petroleum jelly, such as Vaseline, and a nonstick bandage. ? Apply more petroleum jelly and replace the bandage as needed.  Prop up the sore area on pillows anytime you sit or lie down during the next 3 days. Try to keep it above the level of your heart. This helps reduce swelling.  Avoid any activity that could cause your wound to get worse.  Be safe with medicines. Read and follow all instructions on the label. ? If the doctor gave you a prescription medicine for pain, take it as prescribed.   ? If you are not taking a prescription pain medicine, ask your doctor if you can take an over-the-counter medicine.  If your doctor prescribed antibiotics, take them as directed. Do not stop taking them just because you feel better. You need to take the full course of antibiotics. When should you call for help? Call your doctor now or seek immediate medical care if:     You have new pain, or your pain gets worse.      The wound starts to bleed, and blood soaks through the bandage. Oozing small amounts of blood is normal.      The skin near the wound is cold or pale or changes color.      You have tingling, weakness, or numbness near the wound.      You have trouble moving the area near the wound.      You have symptoms of infection, such as:  ? Increased pain, swelling, warmth, or redness around the wound. ? Red streaks leading from the wound. ? Pus draining from the wound. ? A fever. Watch closely for changes in your health, and be sure to contact your doctor if:     The cut reopens.      You do not get better as expected. Where can you learn more? Go to https://iCrossing.Adatao. org and sign in to your [employer] account. Enter W953 in the [employer] box to learn more about \"Puncture Wounds: Care Instructions. \"     If you do not have an account, please click on the \"Sign Up Now\" link. Current as of: July 1, 2021               Content Version: 13.2  © 2466-4902 Healthwise, Incorporated. Care instructions adapted under license by [facility] ([facility]). If you have questions about a medical condition or this instruction, always ask your healthcare professional. [place] [address] any warranty or liability for your use of this information.

## 2022-11-16 ENCOUNTER — HOSPITAL ENCOUNTER (EMERGENCY)
Age: 67
Discharge: HOME | End: 2022-11-16
Attending: EMERGENCY MEDICINE
Payer: MEDICARE

## 2022-11-16 ENCOUNTER — APPOINTMENT (OUTPATIENT)
Dept: CT IMAGING | Age: 67
End: 2022-11-16
Payer: MEDICARE

## 2022-11-16 VITALS
WEIGHT: 192 LBS | DIASTOLIC BLOOD PRESSURE: 76 MMHG | RESPIRATION RATE: 20 BRPM | TEMPERATURE: 98 F | BODY MASS INDEX: 30.86 KG/M2 | SYSTOLIC BLOOD PRESSURE: 206 MMHG | OXYGEN SATURATION: 98 % | HEIGHT: 66 IN | HEART RATE: 65 BPM

## 2022-11-16 DIAGNOSIS — G44.209 TENSION HEADACHE: ICD-10-CM

## 2022-11-16 DIAGNOSIS — M43.6 TORTICOLLIS: Primary | ICD-10-CM

## 2022-11-16 PROCEDURE — 99284 EMERGENCY DEPT VISIT MOD MDM: CPT

## 2022-11-16 PROCEDURE — 6360000002 HC RX W HCPCS: Performed by: EMERGENCY MEDICINE

## 2022-11-16 PROCEDURE — 6370000000 HC RX 637 (ALT 250 FOR IP): Performed by: EMERGENCY MEDICINE

## 2022-11-16 PROCEDURE — 70450 CT HEAD/BRAIN W/O DYE: CPT

## 2022-11-16 RX ORDER — CYCLOBENZAPRINE HCL 10 MG
10 TABLET ORAL 3 TIMES DAILY PRN
Qty: 21 TABLET | Refills: 0 | Status: SHIPPED | OUTPATIENT
Start: 2022-11-16 | End: 2022-11-23

## 2022-11-16 RX ORDER — CYCLOBENZAPRINE HCL 10 MG
10 TABLET ORAL ONCE
Status: COMPLETED | OUTPATIENT
Start: 2022-11-16 | End: 2022-11-16

## 2022-11-16 RX ORDER — DEXAMETHASONE 4 MG/1
8 TABLET ORAL ONCE
Status: COMPLETED | OUTPATIENT
Start: 2022-11-16 | End: 2022-11-16

## 2022-11-16 RX ORDER — TRAMADOL HYDROCHLORIDE 50 MG/1
50 TABLET ORAL EVERY 8 HOURS PRN
Qty: 20 TABLET | Refills: 0 | Status: SHIPPED | OUTPATIENT
Start: 2022-11-16 | End: 2022-11-22

## 2022-11-16 RX ADMIN — DEXAMETHASONE 8 MG: 4 TABLET ORAL at 10:44

## 2022-11-16 RX ADMIN — CYCLOBENZAPRINE 10 MG: 10 TABLET, FILM COATED ORAL at 10:44

## 2022-11-16 ASSESSMENT — ENCOUNTER SYMPTOMS
WHEEZING: 0
TROUBLE SWALLOWING: 0
EYE REDNESS: 0
COUGH: 0
EYE PAIN: 0
SHORTNESS OF BREATH: 0
CHOKING: 0
FACIAL SWELLING: 0
BACK PAIN: 0
EYE DISCHARGE: 0
CONSTIPATION: 0
STRIDOR: 0
BLOOD IN STOOL: 0
VOICE CHANGE: 0
DIARRHEA: 0
SINUS PRESSURE: 0
SORE THROAT: 0
VOMITING: 0
CHEST TIGHTNESS: 0
ABDOMINAL PAIN: 0

## 2022-11-16 ASSESSMENT — PAIN DESCRIPTION - ORIENTATION: ORIENTATION: LEFT

## 2022-11-16 ASSESSMENT — PAIN DESCRIPTION - PAIN TYPE: TYPE: ACUTE PAIN

## 2022-11-16 ASSESSMENT — PAIN DESCRIPTION - ONSET: ONSET: ON-GOING

## 2022-11-16 ASSESSMENT — PAIN DESCRIPTION - FREQUENCY: FREQUENCY: CONTINUOUS

## 2022-11-16 ASSESSMENT — PAIN SCALES - GENERAL
PAINLEVEL_OUTOF10: 6
PAINLEVEL_OUTOF10: 3

## 2022-11-16 ASSESSMENT — PAIN - FUNCTIONAL ASSESSMENT
PAIN_FUNCTIONAL_ASSESSMENT: 0-10
PAIN_FUNCTIONAL_ASSESSMENT: 0-10

## 2022-11-16 ASSESSMENT — PAIN DESCRIPTION - LOCATION
LOCATION: HEAD;NECK
LOCATION: HEAD

## 2022-11-16 ASSESSMENT — PAIN DESCRIPTION - DESCRIPTORS: DESCRIPTORS: THROBBING

## 2022-11-16 NOTE — ED TRIAGE NOTES
Pt to ER with c/o left sided headache and neck pain starting last week after sleeping on an air mattress, Pt states she is concerned because she states the left side of her head is \"swollen\" Pt states she has been taking Advil which has been helping but the pain returns.  Pt states last Advil taken around 0600

## 2022-11-16 NOTE — ED NOTES
Dr. Esme Ospina notified about elevated blood pressure     Jen Pelayo, [facility] / [facility]  11/16/22 2587

## 2022-11-16 NOTE — ED PROVIDER NOTES
[address] ED  eMERGENCY dEPARTMENT eNCOUnter      Pt Name: Renee Chase  MRN: [id number]  [place] 1955  Date of evaluation: 11/16/2022  Provider: Isreal Dove MD    [address] [place] [address]       Chief Complaint   Patient presents with    Headache     Pt c/o headache & Neck pain, left sided since last week after sleeping on an air mattress. Pt states the left side of her head is \"swollen\"     STORY OF PRESENT ILLNESS   (Location/Symptom, Timing/Onset,Context/Setting, Quality, Duration, Modifying Factors, Severity)  Note limiting factors. Renee Chase is a 78 y.o. female who presents to the emergency department history of trigeminal neuralgia hypertension presents emergency for heart going on for the last 1 week ever since he slipped on a air mattress left side neck and head pain feels slightly swollen patient has no injury no fall no motor vehicle accident no previous neck surgery status post remote hysterectomy tonsillectomy as per patient patient does see a neurologist at [facility] for her trigeminal neuralgia and taking maximum medications denies any facial numbness no fever no chills is worse with movement or change position as per patient a week ago when she was sleeping on an air mattress he had laid out and her head got tilted and she woke up with pain in the left side of the neck and head her headache is still there for the last 1 week time and feels slightly swollen on the left side of the head patient has history of migraine headache but this headache is slightly different headache no nausea no vomiting no fever no chills    HPI    NursingNotes were reviewed. REVIEW OF SYSTEMS    (2-9 systems for level 4, 10 or more for level 5)     Review of Systems   Constitutional: Negative. Negative for activity change and fever. HENT:  Negative for congestion, drooling, facial swelling, mouth sores, nosebleeds, sinus pressure, sore throat, trouble swallowing and voice change. Eyes:  Negative for pain, discharge, redness and visual disturbance. Respiratory:  Negative for cough, choking, chest tightness, shortness of breath, wheezing and stridor. Cardiovascular:  Negative for chest pain, palpitations and leg swelling. Gastrointestinal:  Negative for abdominal pain, blood in stool, constipation, diarrhea and vomiting. Endocrine: Negative for cold intolerance, polyphagia and polyuria. Genitourinary:  Negative for dysuria, flank pain, frequency, genital sores and urgency. Musculoskeletal:  Positive for arthralgias, neck pain and neck stiffness. Negative for back pain. Skin:  Negative for pallor and rash. Neurological:  Positive for headaches. Negative for tremors, seizures, syncope, weakness and numbness. Hematological:  Negative for adenopathy. Does not bruise/bleed easily. Psychiatric/Behavioral:  Negative for agitation, behavioral problems, hallucinations and sleep disturbance. The patient is not hyperactive. All other systems reviewed and are negative. Except as noted above the remainder of the review of systems was reviewed and negative.        PAST MEDICAL HISTORY     Past Medical History:   Diagnosis Date    Hypertension     Trigeminal neuralgia pain          SURGICALHISTORY       Past Surgical History:   Procedure Laterality Date    HYSTERECTOMY (CERVIX STATUS UNKNOWN)      TONSILLECTOMY           CURRENT MEDICATIONS       Previous Medications    AMLODIPINE (NORVASC) 5 MG TABLET    Take 5 mg by mouth daily    ASPIRIN 325 MG TABLET    Take 325 mg by mouth daily    BISOPROLOL-HYDROCHLOROTHIAZIDE (ZIAC) 5-6.25 MG PER TABLET    Take 1 tablet by mouth daily    FLUTICASONE (FLONASE) 50 MCG/ACT NASAL SPRAY    1 spray by Nasal route daily    NAPROXEN SODIUM (ALEVE PO)    Take by mouth    OXCARBAZEPINE (TRILEPTAL) 150 MG TABLET    TAKE 2 TABLETS BY MOUTH TWICE DAILY       ALLERGIES     Bactrim [sulfamethoxazole-trimethoprim] and Seasonal    FAMILY HISTORY History reviewed. No pertinent family history. SOCIAL HISTORY       Social History     Socioeconomic History    Marital status: Single     Spouse name: None    Number of children: None    Years of education: None    Highest education level: None   Tobacco Use    Smoking status: Former     Packs/day: 0.50     Years: 35.00     Pack years: 17.50     Types: Cigarettes     Quit date:      Years since quittin.8    Smokeless tobacco: Never   Substance and Sexual Activity    Alcohol use: No    Drug use: No    Sexual activity: Not Currently     Partners: Male     Social Determinants of Health     Financial Resource Strain: Low Risk     Difficulty of Paying Living Expenses: Not hard at all   Food Insecurity: No Food Insecurity    Worried About [employer] in the Last Year: Never true    [employer] in the Last Year: Never true       SCREENINGS    [place] Coma Scale  Eye Opening: Spontaneous  Best Verbal Response: Oriented  Best Motor Response: Obeys commands  [place] Coma Scale Score: 15 @FLOW(61424681)@      PHYSICAL EXAM    (up to 7 for level 4, 8 or more for level 5)     ED Triage Vitals [22 1016]   BP Temp Temp Source Heart Rate Resp SpO2 Height Weight   (!) 193/89 98 °F (36.7 °C) Oral 67 20 96 % 5' 6\" (1.676 m) 192 lb (87.1 kg)       Physical Exam  Vitals and nursing note reviewed. Constitutional:       General: She is not in acute distress. Appearance: She is well-developed. She is not ill-appearing, toxic-appearing or diaphoretic. HENT:      Head: Normocephalic and atraumatic. Comments: Attention turned to the scalp patient has no folliculitis no hematoma no bruise noted to left side of the head or scalp     Right Ear: Tympanic membrane, ear canal and external ear normal.      Left Ear: Tympanic membrane, ear canal and external ear normal.      Nose: Nose normal. No rhinorrhea.       Mouth/Throat:      Mouth: Mucous membranes are moist.      Pharynx: No oropharyngeal exudate or posterior oropharyngeal erythema. Eyes:      General:         Right eye: No discharge. Left eye: No discharge. Extraocular Movements: Extraocular movements intact. Pupils: Pupils are equal, round, and reactive to light. Neck:      Vascular: No carotid bruit. Comments: Attention: Neck patient has no midline tenderness hematoma no bruise noted patient has a minimal spasm tenderness to the left trapezii on palpation patient has no insect bite no bruising or hematoma noted  Cardiovascular:      Rate and Rhythm: Normal rate and regular rhythm. Heart sounds: Normal heart sounds. No murmur heard. No gallop. Pulmonary:      Effort: No respiratory distress. Breath sounds: Normal breath sounds. No stridor. No wheezing or rhonchi. Chest:      Chest wall: No tenderness. Abdominal:      General: Bowel sounds are normal. There is no distension. Palpations: Abdomen is soft. There is no mass. Tenderness: There is no abdominal tenderness. There is no right CVA tenderness, guarding or rebound. Musculoskeletal:         General: No swelling, tenderness, deformity or signs of injury. Normal range of motion. Cervical back: Neck supple. No rigidity. Right lower leg: No edema. Left lower leg: No edema. Lymphadenopathy:      Cervical: No cervical adenopathy. Skin:     General: Skin is warm. Capillary Refill: Capillary refill takes less than 2 seconds. Coloration: Skin is not jaundiced or pale. Findings: No bruising, erythema, lesion or rash. Neurological:      General: No focal deficit present. Mental Status: She is alert and oriented to person, place, and time. Cranial Nerves: No cranial nerve deficit. Sensory: No sensory deficit. Motor: No weakness or abnormal muscle tone.       Coordination: Coordination normal.      Gait: Gait normal.      Deep Tendon Reflexes: Reflexes normal.      Comments: Attention given to neurological examination cranial nerve II through XII grossly intact good bilateral handgrips patient has no sensory deficit   Psychiatric:         Behavior: Behavior normal.         Thought Content: Thought content normal.       DIAGNOSTIC RESULTS     EKG: All EKG's are interpreted by the Emergency Department Physician who either signs or Co-signsthis chart in the absence of a cardiologist.        RADIOLOGY:   Alvenia Noti such as CT, Ultrasound and MRI are read by the radiologist. Plain radiographic images are visualized and preliminarily interpreted by the emergency physician with the below findings:        Interpretation per the Radiologist below, if available at the time ofthis note:    CT Head W/O Contrast    (Results Pending)         ED BEDSIDE ULTRASOUND:   Performed by ED Physician - none    LABS:  Labs Reviewed - No data to display    All other labs were within normal range or not returned as of this dictation. EMERGENCY DEPARTMENT COURSE and DIFFERENTIAL DIAGNOSIS/MDM:   Vitals:    Vitals:    11/16/22 1016   BP: (!) 193/89   Pulse: 67   Resp: 20   Temp: 98 °F (36.7 °C)   TempSrc: Oral   SpO2: 96%   Weight: 192 lb (87.1 kg)   Height: 5' 6\" (1.676 m)           MDM      CRITICAL CARE TIME   Total Critical Care time was  minutes, excluding separately reportableprocedures. There was a high probability of clinicallysignificant/life threatening deterioration in the patient's condition which required my urgent intervention. ONSULTS:  None    PROCEDURES:  Unless otherwise noted below, none     Procedures    FINAL IMPRESSION      1. Torticollis    2.  Tension headache          DISPOSITION/PLAN   DISPOSITION        PATIENT REFERRED TO:  Melita Gary MD  [facility], [address]  [phone]    In 1 week      DISCHARGE MEDICATIONS:  New Prescriptions    No medications on file          (Please note that portions of this note were completed with a voice recognition program. Efforts were made to edit the dictations but occasionally words are mis-transcribed.)    Patsy Mahmood MD (electronically signed)  Attending Emergency Physician        Patsy Mahmood MD  11/16/22 1122       Patsy Mahmood MD  11/16/22 1123

## 2023-06-12 ENCOUNTER — OFFICE VISIT (OUTPATIENT)
Dept: PRIMARY CARE | Facility: CLINIC | Age: 68
End: 2023-06-12
Payer: MEDICARE

## 2023-06-12 VITALS
DIASTOLIC BLOOD PRESSURE: 70 MMHG | HEART RATE: 54 BPM | WEIGHT: 206 LBS | HEIGHT: 63 IN | SYSTOLIC BLOOD PRESSURE: 140 MMHG | BODY MASS INDEX: 36.5 KG/M2 | TEMPERATURE: 98.3 F | RESPIRATION RATE: 14 BRPM

## 2023-06-12 DIAGNOSIS — F17.210 CIGARETTE NICOTINE DEPENDENCE WITHOUT COMPLICATION: ICD-10-CM

## 2023-06-12 DIAGNOSIS — Z12.11 ENCOUNTER FOR SCREENING FOR MALIGNANT NEOPLASM OF COLON: ICD-10-CM

## 2023-06-12 DIAGNOSIS — N18.31 STAGE 3A CHRONIC KIDNEY DISEASE (MULTI): ICD-10-CM

## 2023-06-12 DIAGNOSIS — Z78.0 MENOPAUSE PRESENT: ICD-10-CM

## 2023-06-12 DIAGNOSIS — E83.52 HYPERCALCEMIA: ICD-10-CM

## 2023-06-12 DIAGNOSIS — E21.0 PRIMARY HYPERPARATHYROIDISM (MULTI): ICD-10-CM

## 2023-06-12 DIAGNOSIS — Z12.31 ENCOUNTER FOR SCREENING MAMMOGRAM FOR MALIGNANT NEOPLASM OF BREAST: ICD-10-CM

## 2023-06-12 DIAGNOSIS — Z00.00 ROUTINE GENERAL MEDICAL EXAMINATION AT HEALTH CARE FACILITY: Primary | ICD-10-CM

## 2023-06-12 DIAGNOSIS — G50.0 TRIGEMINAL NEURALGIA: ICD-10-CM

## 2023-06-12 DIAGNOSIS — E66.01 OBESITY, MORBID (MULTI): ICD-10-CM

## 2023-06-12 DIAGNOSIS — I10 ESSENTIAL HYPERTENSION: ICD-10-CM

## 2023-06-12 DIAGNOSIS — R73.03 PREDIABETES: ICD-10-CM

## 2023-06-12 DIAGNOSIS — F51.01 PRIMARY INSOMNIA: ICD-10-CM

## 2023-06-12 PROBLEM — D64.9 ANEMIA, UNSPECIFIED: Status: ACTIVE | Noted: 2021-11-08

## 2023-06-12 PROBLEM — N18.30 CHRONIC KIDNEY DISEASE, STAGE III (MODERATE) (MULTI): Status: ACTIVE | Noted: 2023-06-12

## 2023-06-12 LAB — POC HEMOGLOBIN A1C: 5.9 % (ref 4.2–6.5)

## 2023-06-12 PROCEDURE — 1159F MED LIST DOCD IN RCRD: CPT | Performed by: FAMILY MEDICINE

## 2023-06-12 PROCEDURE — 99497 ADVNCD CARE PLAN 30 MIN: CPT | Performed by: FAMILY MEDICINE

## 2023-06-12 PROCEDURE — 3078F DIAST BP <80 MM HG: CPT | Performed by: FAMILY MEDICINE

## 2023-06-12 PROCEDURE — 1160F RVW MEDS BY RX/DR IN RCRD: CPT | Performed by: FAMILY MEDICINE

## 2023-06-12 PROCEDURE — 83036 HEMOGLOBIN GLYCOSYLATED A1C: CPT | Performed by: FAMILY MEDICINE

## 2023-06-12 PROCEDURE — 1170F FXNL STATUS ASSESSED: CPT | Performed by: FAMILY MEDICINE

## 2023-06-12 PROCEDURE — 3077F SYST BP >= 140 MM HG: CPT | Performed by: FAMILY MEDICINE

## 2023-06-12 PROCEDURE — 1036F TOBACCO NON-USER: CPT | Performed by: FAMILY MEDICINE

## 2023-06-12 PROCEDURE — 99213 OFFICE O/P EST LOW 20 MIN: CPT | Performed by: FAMILY MEDICINE

## 2023-06-12 RX ORDER — BISOPROLOL FUMARATE AND HYDROCHLOROTHIAZIDE 5; 6.25 MG/1; MG/1
1 TABLET ORAL DAILY
Qty: 90 TABLET | Refills: 3 | Status: SHIPPED | OUTPATIENT
Start: 2023-06-12 | End: 2024-06-11

## 2023-06-12 RX ORDER — AMLODIPINE BESYLATE 5 MG/1
5 TABLET ORAL DAILY
COMMUNITY
End: 2023-06-12 | Stop reason: SDUPTHER

## 2023-06-12 RX ORDER — CHLORHEXIDINE GLUCONATE ORAL RINSE 1.2 MG/ML
15 SOLUTION DENTAL AS NEEDED
COMMUNITY
Start: 2023-05-23

## 2023-06-12 RX ORDER — MELOXICAM 15 MG/1
15 TABLET ORAL
COMMUNITY
Start: 2022-07-05 | End: 2023-06-12 | Stop reason: ALTCHOICE

## 2023-06-12 RX ORDER — FLUTICASONE PROPIONATE 50 MCG
1 SPRAY, SUSPENSION (ML) NASAL DAILY
COMMUNITY
Start: 2021-10-24

## 2023-06-12 RX ORDER — ZINC GLUCONATE 50 MG
1 TABLET ORAL DAILY
COMMUNITY
Start: 2022-05-27

## 2023-06-12 RX ORDER — FENUGREEK SEED/BL.THISTLE/ANIS 340 MG
1 CAPSULE ORAL DAILY
COMMUNITY
Start: 2022-05-27

## 2023-06-12 RX ORDER — ASPIRIN 81 MG/1
81 TABLET ORAL
COMMUNITY
End: 2023-07-06 | Stop reason: ALTCHOICE

## 2023-06-12 RX ORDER — OXCARBAZEPINE 150 MG/1
150 TABLET, FILM COATED ORAL 2 TIMES DAILY
COMMUNITY

## 2023-06-12 RX ORDER — BISOPROLOL FUMARATE AND HYDROCHLOROTHIAZIDE 5; 6.25 MG/1; MG/1
1 TABLET ORAL DAILY
COMMUNITY
End: 2023-06-12 | Stop reason: SDUPTHER

## 2023-06-12 RX ORDER — AMLODIPINE BESYLATE 5 MG/1
5 TABLET ORAL DAILY
Qty: 90 TABLET | Refills: 3 | Status: SHIPPED | OUTPATIENT
Start: 2023-06-12 | End: 2024-06-11

## 2023-06-12 ASSESSMENT — ACTIVITIES OF DAILY LIVING (ADL)
DRESSING: INDEPENDENT
TAKING_MEDICATION: INDEPENDENT
GROCERY_SHOPPING: INDEPENDENT
MANAGING_FINANCES: INDEPENDENT
BATHING: INDEPENDENT
DOING_HOUSEWORK: INDEPENDENT

## 2023-06-12 ASSESSMENT — PATIENT HEALTH QUESTIONNAIRE - PHQ9
10. IF YOU CHECKED OFF ANY PROBLEMS, HOW DIFFICULT HAVE THESE PROBLEMS MADE IT FOR YOU TO DO YOUR WORK, TAKE CARE OF THINGS AT HOME, OR GET ALONG WITH OTHER PEOPLE: SOMEWHAT DIFFICULT
SUM OF ALL RESPONSES TO PHQ9 QUESTIONS 1 AND 2: 2
2. FEELING DOWN, DEPRESSED OR HOPELESS: SEVERAL DAYS
1. LITTLE INTEREST OR PLEASURE IN DOING THINGS: SEVERAL DAYS

## 2023-06-12 NOTE — ASSESSMENT & PLAN NOTE
She continues to follow with neurology on this and is having reasonable symptom control with her Trileptal which we will continue.

## 2023-06-12 NOTE — ASSESSMENT & PLAN NOTE
Blood pressure was initially quite elevated when she checked in but after sitting quietly for a few moments it came down to 140/70 which is at goal for her age.  I will refill her amlodipine and bisoprolol/HCT.  I reviewed metabolic panel, renal panel, and calcium levels from her specialists which are in the normal range except for a very slightly elevated calcium

## 2023-06-12 NOTE — ASSESSMENT & PLAN NOTE
She will continue to follow with nephrology on this but her calcium level remains very slightly elevated and does not require intervention currently

## 2023-06-12 NOTE — ASSESSMENT & PLAN NOTE
She has been really watching her sugar intake and her A1c has improved to a level of 5.9% today.  I encouraged her to continue with this plan

## 2023-06-12 NOTE — PROGRESS NOTES
"Subjective   Reason for Visit: Willa Fox is an 68 y.o. female here for a Medicare Wellness visit.     Past Medical, Surgical, and Family History reviewed and updated in chart.    Reviewed all medications by prescribing practitioner or clinical pharmacist (such as prescriptions, OTCs, herbal therapies and supplements) and documented in the medical record.    HPI    Patient Care Team:  Rafy Ragsdale MD as PCP - General  Rafy Ragsdale MD as PCP - [employer] PCP     Review of Systems    Objective   Vitals:  /70   Pulse 54   Temp 36.8 °C (98.3 °F)   Resp 14   Ht 1.6 m (5' 3\")   Wt 93.4 kg (206 lb)   BMI 36.49 kg/m²       Physical Exam  Constitutional:       Appearance: Normal appearance.   HENT:      Head: Normocephalic.   Eyes:      Conjunctiva/sclera: Conjunctivae normal.   Cardiovascular:      Rate and Rhythm: Normal rate and regular rhythm.   Pulmonary:      Effort: Pulmonary effort is normal.      Breath sounds: Normal breath sounds.   Musculoskeletal:      Cervical back: Neck supple.   Skin:     General: Skin is warm and dry.   Neurological:      Mental Status: She is alert.         Assessment/Plan   Problem List Items Addressed This Visit       Essential hypertension    Current Assessment & Plan     Blood pressure was initially quite elevated when she checked in but after sitting quietly for a few moments it came down to 140/70 which is at goal for her age.  I will refill her amlodipine and bisoprolol/HCT.  I reviewed metabolic panel, renal panel, and calcium levels from her specialists which are in the normal range except for a very slightly elevated calcium         Relevant Medications    amLODIPine (Norvasc) 5 mg tablet    bisoproloL-hydrochlorothiazide (Ziac) 5-6.25 mg tablet    Hypercalcemia    Primary hyperparathyroidism (CMS/[facility])    Current Assessment & Plan     She will continue to follow with nephrology on this but her calcium level remains very slightly " elevated and does not require intervention currently         Prediabetes    Current Assessment & Plan     She has been really watching her sugar intake and her A1c has improved to a level of 5.9% today.  I encouraged her to continue with this plan         Relevant Orders    POCT glycosylated hemoglobin (Hb A1C) manually resulted (Completed)    Trigeminal neuralgia    Current Assessment & Plan     She continues to follow with neurology on this and is having reasonable symptom control with her Trileptal which we will continue.         Primary insomnia    Current Assessment & Plan     She will continue to control this with Unisom          Other Visit Diagnoses       Routine general medical examination at health care facility    -  Primary    Encounter for screening mammogram for malignant neoplasm of breast        Relevant Orders    BI mammo bilateral screening tomosynthesis    Encounter for screening for malignant neoplasm of colon        Relevant Orders    Cologuard® colon cancer screening    Menopause present        Relevant Orders    XR DEXA bone density    Cigarette nicotine dependence without complication        Relevant Orders    CT lung screening low dose        She reports a 35-pack-year history of smoking putting her at significant risk for lung cancer although she quit many years ago.  I will schedule a routine low-dose screening CT scan advanced care planning was discussed with Willa Fox today. We reviewed code status, Medical Power of , and Living will.

## 2023-06-15 ENCOUNTER — TELEPHONE (OUTPATIENT)
Dept: PRIMARY CARE | Facility: CLINIC | Age: 68
End: 2023-06-15
Payer: MEDICARE

## 2023-06-15 NOTE — TELEPHONE ENCOUNTER
Let her know that her mammogram was completely normal and the CT scanning showed no sign of lung cancer or nodule.  There was an incidental finding of gallstones.  If you have pain in the upper belly or nausea after meals then make an appointment to discuss options.  However, if you are not having pain or nausea then treatment is not necessary for this

## 2023-06-22 LAB — NONINV COLON CA DNA+OCC BLD SCRN STL QL: POSITIVE

## 2023-06-23 ENCOUNTER — TELEPHONE (OUTPATIENT)
Dept: PRIMARY CARE | Facility: CLINIC | Age: 68
End: 2023-06-23
Payer: MEDICARE

## 2023-06-23 DIAGNOSIS — R19.5 POSITIVE COLORECTAL CANCER SCREENING USING COLOGUARD TEST: Primary | ICD-10-CM

## 2023-06-23 NOTE — TELEPHONE ENCOUNTER
----- Message from Rafy Ragsdale MD sent at 6/23/2023 10:32 AM EDT -----  Please schedule her for colonoscopy (order in chart)    (Called and discussed result with patient.  Positive cologuard carries a chance of colon cancer, polyps, or false positive. She will get a colonoscopy scheduled)

## 2023-06-23 NOTE — RESULT ENCOUNTER NOTE
Please schedule her for colonoscopy (order in chart)    (Called and discussed result with patient.  Positive cologuard carries a chance of colon cancer, polyps, or false positive. She will get a colonoscopy scheduled)

## 2023-07-06 ENCOUNTER — OFFICE VISIT (OUTPATIENT)
Dept: PRIMARY CARE | Facility: CLINIC | Age: 68
End: 2023-07-06
Payer: MEDICARE

## 2023-07-06 VITALS
DIASTOLIC BLOOD PRESSURE: 82 MMHG | SYSTOLIC BLOOD PRESSURE: 146 MMHG | WEIGHT: 205 LBS | TEMPERATURE: 98.2 F | HEART RATE: 90 BPM | HEIGHT: 63 IN | RESPIRATION RATE: 14 BRPM | BODY MASS INDEX: 36.32 KG/M2

## 2023-07-06 DIAGNOSIS — K21.9 GASTROESOPHAGEAL REFLUX DISEASE WITHOUT ESOPHAGITIS: Primary | ICD-10-CM

## 2023-07-06 PROCEDURE — 99214 OFFICE O/P EST MOD 30 MIN: CPT | Performed by: FAMILY MEDICINE

## 2023-07-06 PROCEDURE — 3079F DIAST BP 80-89 MM HG: CPT | Performed by: FAMILY MEDICINE

## 2023-07-06 PROCEDURE — 1159F MED LIST DOCD IN RCRD: CPT | Performed by: FAMILY MEDICINE

## 2023-07-06 PROCEDURE — 1126F AMNT PAIN NOTED NONE PRSNT: CPT | Performed by: FAMILY MEDICINE

## 2023-07-06 PROCEDURE — 1160F RVW MEDS BY RX/DR IN RCRD: CPT | Performed by: FAMILY MEDICINE

## 2023-07-06 PROCEDURE — 1036F TOBACCO NON-USER: CPT | Performed by: FAMILY MEDICINE

## 2023-07-06 PROCEDURE — 3077F SYST BP >= 140 MM HG: CPT | Performed by: FAMILY MEDICINE

## 2023-07-06 RX ORDER — PANTOPRAZOLE SODIUM 40 MG/1
40 TABLET, DELAYED RELEASE ORAL DAILY
Qty: 30 TABLET | Refills: 1 | Status: SHIPPED | OUTPATIENT
Start: 2023-07-06 | End: 2023-09-29 | Stop reason: SDUPTHER

## 2023-07-06 RX ORDER — CALC/MAG/B COMPLEX/D3/HERB 61
15 TABLET ORAL
COMMUNITY
End: 2023-07-06 | Stop reason: ALTCHOICE

## 2023-07-06 RX ORDER — MELOXICAM 15 MG/1
7.5 TABLET ORAL DAILY
COMMUNITY
Start: 2022-07-05 | End: 2023-07-06 | Stop reason: ALTCHOICE

## 2023-07-06 ASSESSMENT — ENCOUNTER SYMPTOMS
DIARRHEA: 0
BELCHING: 1
ABDOMINAL PAIN: 1
VOMITING: 0
NAUSEA: 1
CONSTIPATION: 0

## 2023-07-06 NOTE — PROGRESS NOTES
Subjective   Adryan Fox is a 68 y.o. female who presents for Nausea.  This 68-year-old female is describing some mild to moderate nausea and bloating feeling in her epigastric area.  It is actually improved after a meal.  She also has been having some episodes of feeling a regurgitation of liquids with some burning sensation in her chest after meals.  She took some over-the-counter Prevacid with moderate improvement.  All of the symptoms got significantly worse when she got her positive Cologuard test result over which she has been very worried.  She denies any hemoptysis hematemesis, coffee-ground emesis, melena, hematochezia    Abdominal Pain  This is a recurrent problem. The onset quality is gradual. The problem has been unchanged. Associated symptoms include belching and nausea. Pertinent negatives include no constipation, diarrhea or vomiting. Associated symptoms comments: Bloating and heartburn. She has tried proton pump inhibitors for the symptoms. The treatment provided moderate relief.     Visit Vitals  /82   Pulse 90   Temp 36.8 °C (98.2 °F)   Resp 14      Objective   Physical Exam  Constitutional:       Appearance: Normal appearance.   HENT:      Head: Normocephalic.   Eyes:      Conjunctiva/sclera: Conjunctivae normal.   Cardiovascular:      Rate and Rhythm: Normal rate and regular rhythm.   Pulmonary:      Effort: Pulmonary effort is normal.      Breath sounds: Normal breath sounds.   Musculoskeletal:      Cervical back: Neck supple.   Skin:     General: Skin is warm and dry.   Neurological:      Mental Status: She is alert.         Assessment/Plan    This 68-year-old female has a pattern that is strongly correlated to acid reflux disease.  It is much worse after receiving her positive Cologuard test result and so stress may be playing a factor here.  We will increase her acid suppression by switching the Prevacid to pantoprazole 40 mg daily for 1 month.  Hopefully this really is  due to stress and she will be able to discontinue the medicine at the end of that month.    A review of the chart indicates that she had a CT scan which had an incidental finding of gallstones.  It is possible that this is the underlying etiology although her pain is improved after meals which is the opposite of what we would expect with gallstones.  If the nausea and bloating does not resolve after the colonoscopy we will reconsider additional work-up for this including an ultrasound    She has a positive Cologuard test.  I explained that the result indicates that there is a roughly 5% chance of colon cancer, 45% chance of colon polyp, and 50% chance of false positive.  She is already scheduled for colonoscopy and I reassured her that almost all of the time there is a very treatable problem  Problem List Items Addressed This Visit    None  Visit Diagnoses       Gastroesophageal reflux disease without esophagitis    -  Primary    Relevant Medications    pantoprazole (ProtoNix) 40 mg EC tablet

## 2023-09-29 ENCOUNTER — OFFICE VISIT (OUTPATIENT)
Dept: PRIMARY CARE | Facility: CLINIC | Age: 68
End: 2023-09-29
Payer: MEDICARE

## 2023-09-29 VITALS
OXYGEN SATURATION: 96 % | BODY MASS INDEX: 36.31 KG/M2 | TEMPERATURE: 97.1 F | DIASTOLIC BLOOD PRESSURE: 82 MMHG | SYSTOLIC BLOOD PRESSURE: 138 MMHG | HEIGHT: 63 IN | RESPIRATION RATE: 16 BRPM

## 2023-09-29 DIAGNOSIS — K21.9 GASTROESOPHAGEAL REFLUX DISEASE WITHOUT ESOPHAGITIS: ICD-10-CM

## 2023-09-29 DIAGNOSIS — Z00.00 ROUTINE GENERAL MEDICAL EXAMINATION AT HEALTH CARE FACILITY: ICD-10-CM

## 2023-09-29 DIAGNOSIS — M17.11 PRIMARY OSTEOARTHRITIS OF RIGHT KNEE: Primary | ICD-10-CM

## 2023-09-29 DIAGNOSIS — G50.0 TRIGEMINAL NEURALGIA: ICD-10-CM

## 2023-09-29 PROBLEM — E66.9 OBESITY, UNSPECIFIED: Status: ACTIVE | Noted: 2023-06-12

## 2023-09-29 PROCEDURE — 3075F SYST BP GE 130 - 139MM HG: CPT | Performed by: FAMILY MEDICINE

## 2023-09-29 PROCEDURE — 1036F TOBACCO NON-USER: CPT | Performed by: FAMILY MEDICINE

## 2023-09-29 PROCEDURE — 1126F AMNT PAIN NOTED NONE PRSNT: CPT | Performed by: FAMILY MEDICINE

## 2023-09-29 PROCEDURE — 1160F RVW MEDS BY RX/DR IN RCRD: CPT | Performed by: FAMILY MEDICINE

## 2023-09-29 PROCEDURE — G0439 PPPS, SUBSEQ VISIT: HCPCS | Performed by: FAMILY MEDICINE

## 2023-09-29 PROCEDURE — 3079F DIAST BP 80-89 MM HG: CPT | Performed by: FAMILY MEDICINE

## 2023-09-29 PROCEDURE — 1159F MED LIST DOCD IN RCRD: CPT | Performed by: FAMILY MEDICINE

## 2023-09-29 RX ORDER — MELOXICAM 7.5 MG/1
7.5 TABLET ORAL DAILY
Qty: 30 TABLET | Refills: 11 | Status: SHIPPED | OUTPATIENT
Start: 2023-09-29 | End: 2024-09-28

## 2023-09-29 RX ORDER — PANTOPRAZOLE SODIUM 40 MG/1
40 TABLET, DELAYED RELEASE ORAL DAILY
Qty: 90 TABLET | Refills: 3 | Status: SHIPPED | OUTPATIENT
Start: 2023-09-29 | End: 2024-09-28

## 2023-09-29 RX ORDER — MELOXICAM 7.5 MG/1
7.5-15 TABLET ORAL DAILY
COMMUNITY
Start: 2023-08-27 | End: 2023-09-29 | Stop reason: SDUPTHER

## 2023-09-29 ASSESSMENT — ENCOUNTER SYMPTOMS: LEG PAIN: 1

## 2023-09-29 NOTE — PROGRESS NOTES
Subjective   Willa Fox is a 68 y.o. female who presents for Leg Pain and Medicare Annual Wellness Visit Subsequent.  Went to a med place in [place], was diagnose with Osteoporosis, took one Meloxicam and pain disappear, August 3rd started having pain again.  Taking Steroid currently.    She had a annual Medicare wellness physical documented June 12 of this year by myself.  She had all of her ADLs and a IADLs and health screenings done at that point which we reviewed today as well.  That visit was not coded as a Medicare well visit so we will go ahead and review all of that and included as part of our assessment today.    Leg Pain   Incident onset: Started last year, bothered her for a couple of a days. The pain is present in the right leg. The pain is at a severity of 5/10. The pain has been Fluctuating since onset.     Visit Vitals  /82 (BP Location: Right arm, Patient Position: Sitting) Comment: Manuallly   Temp 36.2 °C (97.1 °F)   Resp 16      Objective   Physical Exam  Constitutional:       Appearance: Normal appearance.   HENT:      Head: Normocephalic.   Eyes:      Conjunctiva/sclera: Conjunctivae normal.   Cardiovascular:      Rate and Rhythm: Normal rate and regular rhythm.   Pulmonary:      Effort: Pulmonary effort is normal.      Breath sounds: Normal breath sounds.   Musculoskeletal:      Cervical back: Neck supple.   Skin:     General: Skin is warm and dry.   Neurological:      Mental Status: She is alert.         Assessment/Plan    Problem List Items Addressed This Visit       Trigeminal neuralgia    Relevant Orders    Referral to Neurology    Primary osteoarthritis of right knee - Primary     I reviewed the x-rays showing tricompartment arthritis of the right knee done last year.  Historically it sounds like she had a flareup of her arthritis which is responded well to Mobic and oral prednisone.  The pain is now minimal.  She has some slightly reduced range of motion.  I  recommend she continue intermittent meloxicam for flareups.  I recommend gentle exercise and would certainly endorse the use of an exercise bike.         Relevant Medications    meloxicam (Mobic) 7.5 mg tablet     Other Visit Diagnoses       Gastroesophageal reflux disease without esophagitis        Relevant Medications    pantoprazole (ProtoNix) 40 mg EC tablet    Routine general medical examination at health care facility        Relevant Orders    1 Year Follow Up In [facility] - PCP - Wellness Exam        See all documentation regarding screenings and questionnaires documented by myself June 12 of this year

## 2023-09-29 NOTE — ASSESSMENT & PLAN NOTE
I reviewed the x-rays showing tricompartment arthritis of the right knee done last year.  Historically it sounds like she had a flareup of her arthritis which is responded well to Mobic and oral prednisone.  The pain is now minimal.  She has some slightly reduced range of motion.  I recommend she continue intermittent meloxicam for flareups.  I recommend gentle exercise and would certainly endorse the use of an exercise bike.

## 2024-02-08 ENCOUNTER — TELEPHONE (OUTPATIENT)
Dept: PRIMARY CARE | Facility: CLINIC | Age: 69
End: 2024-02-08
Payer: MEDICARE

## 2024-05-16 ENCOUNTER — OFFICE VISIT (OUTPATIENT)
Dept: INTERNAL MEDICINE | Age: 69
End: 2024-05-16
Payer: MEDICARE

## 2024-05-16 VITALS
HEIGHT: 66 IN | OXYGEN SATURATION: 96 % | BODY MASS INDEX: 34.39 KG/M2 | SYSTOLIC BLOOD PRESSURE: 146 MMHG | WEIGHT: 214 LBS | TEMPERATURE: 97.4 F | DIASTOLIC BLOOD PRESSURE: 90 MMHG | HEART RATE: 76 BPM

## 2024-05-16 DIAGNOSIS — H69.91 DYSFUNCTION OF RIGHT EUSTACHIAN TUBE: Primary | ICD-10-CM

## 2024-05-16 DIAGNOSIS — H61.23 CERUMEN DEBRIS ON TYMPANIC MEMBRANE OF BOTH EARS: ICD-10-CM

## 2024-05-16 PROCEDURE — 3017F COLORECTAL CA SCREEN DOC REV: CPT | Performed by: STUDENT IN AN ORGANIZED HEALTH CARE EDUCATION/TRAINING PROGRAM

## 2024-05-16 PROCEDURE — 1123F ACP DISCUSS/DSCN MKR DOCD: CPT | Performed by: STUDENT IN AN ORGANIZED HEALTH CARE EDUCATION/TRAINING PROGRAM

## 2024-05-16 PROCEDURE — 1036F TOBACCO NON-USER: CPT | Performed by: STUDENT IN AN ORGANIZED HEALTH CARE EDUCATION/TRAINING PROGRAM

## 2024-05-16 PROCEDURE — G8417 CALC BMI ABV UP PARAM F/U: HCPCS | Performed by: STUDENT IN AN ORGANIZED HEALTH CARE EDUCATION/TRAINING PROGRAM

## 2024-05-16 PROCEDURE — G8400 PT W/DXA NO RESULTS DOC: HCPCS | Performed by: STUDENT IN AN ORGANIZED HEALTH CARE EDUCATION/TRAINING PROGRAM

## 2024-05-16 PROCEDURE — G8427 DOCREV CUR MEDS BY ELIG CLIN: HCPCS | Performed by: STUDENT IN AN ORGANIZED HEALTH CARE EDUCATION/TRAINING PROGRAM

## 2024-05-16 PROCEDURE — 99213 OFFICE O/P EST LOW 20 MIN: CPT | Performed by: STUDENT IN AN ORGANIZED HEALTH CARE EDUCATION/TRAINING PROGRAM

## 2024-05-16 PROCEDURE — 1090F PRES/ABSN URINE INCON ASSESS: CPT | Performed by: STUDENT IN AN ORGANIZED HEALTH CARE EDUCATION/TRAINING PROGRAM

## 2024-05-16 RX ORDER — FEXOFENADINE HCL 180 MG/1
180 TABLET ORAL DAILY
Qty: 30 TABLET | Refills: 0 | Status: SHIPPED | OUTPATIENT
Start: 2024-05-16 | End: 2024-06-15

## 2024-05-16 RX ORDER — FLUTICASONE PROPIONATE 50 MCG
2 SPRAY, SUSPENSION (ML) NASAL DAILY
Qty: 16 G | Refills: 0 | Status: SHIPPED | OUTPATIENT
Start: 2024-05-16

## 2024-05-16 SDOH — ECONOMIC STABILITY: HOUSING INSECURITY
IN THE LAST 12 MONTHS, WAS THERE A TIME WHEN YOU DID NOT HAVE A STEADY PLACE TO SLEEP OR SLEPT IN A SHELTER (INCLUDING NOW)?: PATIENT DECLINED

## 2024-05-16 SDOH — ECONOMIC STABILITY: FOOD INSECURITY: WITHIN THE PAST 12 MONTHS, YOU WORRIED THAT YOUR FOOD WOULD RUN OUT BEFORE YOU GOT MONEY TO BUY MORE.: PATIENT DECLINED

## 2024-05-16 SDOH — ECONOMIC STABILITY: FOOD INSECURITY: WITHIN THE PAST 12 MONTHS, THE FOOD YOU BOUGHT JUST DIDN'T LAST AND YOU DIDN'T HAVE MONEY TO GET MORE.: PATIENT DECLINED

## 2024-05-16 SDOH — ECONOMIC STABILITY: INCOME INSECURITY: HOW HARD IS IT FOR YOU TO PAY FOR THE VERY BASICS LIKE FOOD, HOUSING, MEDICAL CARE, AND HEATING?: PATIENT DECLINED

## 2024-05-16 ASSESSMENT — PATIENT HEALTH QUESTIONNAIRE - PHQ9: DEPRESSION UNABLE TO ASSESS: URGENT/EMERGENT SITUATION

## 2024-05-16 NOTE — PROGRESS NOTES
[facility] Internal Medicine  [facility]   [address] [place] [id number]   P: [phone]      Rekha Russ (:  1955) is a 69 y.o. female,Established patient, here for evaluation of the following chief complaint(s):  Otalgia (X 2 weeks. right)      Assessment & Plan   ASSESSMENT/PLAN:  1. Dysfunction of right eustachian tube  Assessment & Plan:  Rx for Antihistamine and Flonase  PCP follow up, consider ENT  Orders:  -     fluticasone (FLONASE) 50 MCG/ACT nasal spray; 2 sprays by Each Nostril route daily, Disp-16 g, R-0Normal  -     fexofenadine (ALLEGRA) 180 MG tablet; Take 1 tablet by mouth daily, Disp-30 tablet, R-0Normal  2. Cerumen debris on tympanic membrane of both ears  -     carbamide peroxide (DEBROX) 6.5 % otic solution; Place 5 drops in ear(s) 2 times daily for 7 days, Disp-15 mL, R-0Normal      No results found for any visits on 24.     No follow-ups on file.         Subjective   SUBJECTIVE/OBJECTIVE:  HPI    68 yo female with history of cerumen impaction, middle ear effusion, former smoker, trigeminal neuralgia , hypertension who presents with R ear \"funny feeling\" best described as a pressure. Denies R ear pain or drainage. Denies fevers. Also has noted mild cough and feels as though she has mucous. Denies SOB.     Has trigeminal neuralgia which she feels is flaring up. Has apt with Neurology next month.     Review of Systems   Constitutional:         See HPI for ROS            Objective   Physical Exam  Constitutional:       Appearance: Normal appearance.   HENT:      Head: Normocephalic and atraumatic.      Right Ear: External ear normal. There is no impacted cerumen.      Left Ear: External ear normal. There is no impacted cerumen.   Eyes:      Extraocular Movements: Extraocular movements intact.   Cardiovascular:      Rate and Rhythm: Normal rate and regular rhythm.   Pulmonary:      Effort: Pulmonary effort is normal. No respiratory

## 2024-08-05 DIAGNOSIS — I10 ESSENTIAL HYPERTENSION: ICD-10-CM

## 2024-08-05 RX ORDER — BISOPROLOL FUMARATE AND HYDROCHLOROTHIAZIDE 5; 6.25 MG/1; MG/1
1 TABLET ORAL DAILY
Qty: 90 TABLET | Refills: 3 | Status: SHIPPED | OUTPATIENT
Start: 2024-08-05 | End: 2025-08-05

## 2024-08-05 RX ORDER — AMLODIPINE BESYLATE 5 MG/1
5 TABLET ORAL DAILY
Qty: 90 TABLET | Refills: 3 | Status: SHIPPED | OUTPATIENT
Start: 2024-08-05 | End: 2025-08-05

## 2024-09-21 DIAGNOSIS — K21.9 GASTROESOPHAGEAL REFLUX DISEASE WITHOUT ESOPHAGITIS: ICD-10-CM

## 2024-09-23 RX ORDER — PANTOPRAZOLE SODIUM 40 MG/1
40 TABLET, DELAYED RELEASE ORAL DAILY
Qty: 90 TABLET | Refills: 3 | Status: SHIPPED | OUTPATIENT
Start: 2024-09-23

## 2024-09-27 ENCOUNTER — APPOINTMENT (OUTPATIENT)
Dept: PRIMARY CARE | Facility: CLINIC | Age: 69
End: 2024-09-27
Payer: MEDICARE

## 2024-09-30 ENCOUNTER — APPOINTMENT (OUTPATIENT)
Dept: PRIMARY CARE | Facility: CLINIC | Age: 69
End: 2024-09-30
Payer: MEDICARE

## 2024-09-30 VITALS
TEMPERATURE: 97.6 F | HEIGHT: 61 IN | SYSTOLIC BLOOD PRESSURE: 150 MMHG | RESPIRATION RATE: 18 BRPM | WEIGHT: 205 LBS | DIASTOLIC BLOOD PRESSURE: 80 MMHG | BODY MASS INDEX: 38.71 KG/M2 | HEART RATE: 56 BPM | OXYGEN SATURATION: 96 %

## 2024-09-30 DIAGNOSIS — E66.01 OBESITY, MORBID (MULTI): ICD-10-CM

## 2024-09-30 DIAGNOSIS — I10 HTN (HYPERTENSION), BENIGN: ICD-10-CM

## 2024-09-30 DIAGNOSIS — Z23 NEED FOR VACCINATION: ICD-10-CM

## 2024-09-30 DIAGNOSIS — E11.9 TYPE 2 DIABETES MELLITUS WITHOUT COMPLICATION, WITHOUT LONG-TERM CURRENT USE OF INSULIN (MULTI): ICD-10-CM

## 2024-09-30 DIAGNOSIS — R73.03 PREDIABETES: ICD-10-CM

## 2024-09-30 DIAGNOSIS — I10 ESSENTIAL HYPERTENSION: ICD-10-CM

## 2024-09-30 DIAGNOSIS — E21.0 PRIMARY HYPERPARATHYROIDISM (MULTI): ICD-10-CM

## 2024-09-30 DIAGNOSIS — Z12.31 ENCOUNTER FOR SCREENING MAMMOGRAM FOR BREAST CANCER: ICD-10-CM

## 2024-09-30 DIAGNOSIS — Z00.00 ROUTINE GENERAL MEDICAL EXAMINATION AT HEALTH CARE FACILITY: Primary | ICD-10-CM

## 2024-09-30 LAB — POC HEMOGLOBIN A1C: 6.6 % (ref 4.2–6.5)

## 2024-09-30 PROCEDURE — G0008 ADMIN INFLUENZA VIRUS VAC: HCPCS | Performed by: FAMILY MEDICINE

## 2024-09-30 PROCEDURE — 90662 IIV NO PRSV INCREASED AG IM: CPT | Performed by: FAMILY MEDICINE

## 2024-09-30 PROCEDURE — 83036 HEMOGLOBIN GLYCOSYLATED A1C: CPT | Performed by: FAMILY MEDICINE

## 2024-09-30 PROCEDURE — 1159F MED LIST DOCD IN RCRD: CPT | Performed by: FAMILY MEDICINE

## 2024-09-30 PROCEDURE — 3008F BODY MASS INDEX DOCD: CPT | Performed by: FAMILY MEDICINE

## 2024-09-30 PROCEDURE — G0439 PPPS, SUBSEQ VISIT: HCPCS | Performed by: FAMILY MEDICINE

## 2024-09-30 PROCEDURE — 1036F TOBACCO NON-USER: CPT | Performed by: FAMILY MEDICINE

## 2024-09-30 PROCEDURE — 3077F SYST BP >= 140 MM HG: CPT | Performed by: FAMILY MEDICINE

## 2024-09-30 PROCEDURE — 90677 PCV20 VACCINE IM: CPT | Performed by: FAMILY MEDICINE

## 2024-09-30 PROCEDURE — 99497 ADVNCD CARE PLAN 30 MIN: CPT | Performed by: FAMILY MEDICINE

## 2024-09-30 PROCEDURE — 3079F DIAST BP 80-89 MM HG: CPT | Performed by: FAMILY MEDICINE

## 2024-09-30 PROCEDURE — G0009 ADMIN PNEUMOCOCCAL VACCINE: HCPCS | Performed by: FAMILY MEDICINE

## 2024-09-30 PROCEDURE — 1170F FXNL STATUS ASSESSED: CPT | Performed by: FAMILY MEDICINE

## 2024-09-30 PROCEDURE — 1158F ADVNC CARE PLAN TLK DOCD: CPT | Performed by: FAMILY MEDICINE

## 2024-09-30 PROCEDURE — 1123F ACP DISCUSS/DSCN MKR DOCD: CPT | Performed by: FAMILY MEDICINE

## 2024-09-30 RX ORDER — AMLODIPINE BESYLATE 10 MG/1
10 TABLET ORAL DAILY
Qty: 90 TABLET | Refills: 3 | Status: SHIPPED | OUTPATIENT
Start: 2024-09-30

## 2024-09-30 ASSESSMENT — ACTIVITIES OF DAILY LIVING (ADL)
MANAGING_FINANCES: INDEPENDENT
PATIENT'S MEMORY ADEQUATE TO SAFELY COMPLETE DAILY ACTIVITIES?: YES
TAKING_MEDICATION: INDEPENDENT
DRESSING YOURSELF: INDEPENDENT
DOING_HOUSEWORK: INDEPENDENT
TOILETING: INDEPENDENT
WALKS IN HOME: INDEPENDENT
DOING_HOUSEWORK: INDEPENDENT
MANAGING_FINANCES: INDEPENDENT
GROCERY_SHOPPING: INDEPENDENT
GROCERY_SHOPPING: INDEPENDENT
FEEDING YOURSELF: INDEPENDENT
DOING_HOUSEWORK: INDEPENDENT
JUDGMENT_ADEQUATE_SAFELY_COMPLETE_DAILY_ACTIVITIES: YES
HEARING - RIGHT EAR: FUNCTIONAL
TAKING_MEDICATION: INDEPENDENT
HEARING - LEFT EAR: FUNCTIONAL
ADEQUATE_TO_COMPLETE_ADL: YES
MANAGING_FINANCES: INDEPENDENT
TAKING_MEDICATION: INDEPENDENT
GROCERY_SHOPPING: INDEPENDENT
GROOMING: INDEPENDENT
BATHING: INDEPENDENT

## 2024-09-30 ASSESSMENT — ENCOUNTER SYMPTOMS
OCCASIONAL FEELINGS OF UNSTEADINESS: 0
LOSS OF SENSATION IN FEET: 0

## 2024-09-30 ASSESSMENT — PATIENT HEALTH QUESTIONNAIRE - PHQ9: 1. LITTLE INTEREST OR PLEASURE IN DOING THINGS: SEVERAL DAYS

## 2024-09-30 NOTE — PROGRESS NOTES
"Subjective   Reason for Visit: Willa Fox is an 69 y.o. female here for a Medicare Wellness visit.     Past Medical, Surgical, and Family History reviewed and updated in chart.    Reviewed all medications by prescribing practitioner or clinical pharmacist (such as prescriptions, OTCs, herbal therapies and supplements) and documented in the medical record.    In addition to the routine physical she would like to talk about her arthritis which is worse in her right knee and she is interested in taking turmeric supplements.  She also wants to follow-up on her prediabetes and have her A1c checked.  She is also here to follow-up on her blood pressure.  There are no recent interval readings        Patient Care Team:  Rafy Ragsdale MD as PCP - General  Rafy Ragsdale MD as PCP - [employer] PCP     Review of Systems    Objective   Vitals:  /89   Pulse 56   Temp 36.4 °C (97.6 °F)   Resp 18   Ht 1.537 m (5' 0.5\")   Wt 93 kg (205 lb)   SpO2 96%   BMI 39.38 kg/m²       Physical Exam  Constitutional:       Appearance: Normal appearance.   HENT:      Head: Normocephalic.   Pulmonary:      Effort: Pulmonary effort is normal.   Musculoskeletal:      Cervical back: Neck supple.   Skin:     General: Skin is warm and dry.   Psychiatric:         Mood and Affect: Mood normal.         Assessment & Plan  Routine general medical examination at health care facility    Orders:    1 Year Follow Up In [facility] - PCP - Wellness Exam; Future    Prediabetes    Orders:    POCT glycosylated hemoglobin (Hb A1C) manually resulted    Primary hyperparathyroidism (Multi)  I reviewed labs that she will continue to follow with her specialist on this.       Obesity, morbid (Multi)         Need for vaccination    Orders:    Pneumococcal vaccine 20-valent    diphth,pertus,acell,,tetanus (BoostRIX) 2.5-8-5 Lf-mcg-Lf/0.5mL injection; Inject 0.5 mL into the muscle 1 time for 1 dose.    Flu vaccine, high " dose    Encounter for screening mammogram for breast cancer    Orders:    BI mammo bilateral screening tomosynthesis; Future    HTN (hypertension), benign  Unfortunately blood pressure is above goal today even after taking it several times manually.  We will go ahead and increase the dose of her amlodipine to 10 mg and follow-up  Orders:    Follow Up In [facility] - PCP - Established; Future    Essential hypertension    Orders:    amLODIPine (Norvasc) 10 mg tablet; Take 1 tablet (10 mg) by mouth once daily.    Type 2 diabetes mellitus without complication, without long-term current use of insulin (Multi)  A1c has increased and is now above 6.4% indicating mild diabetes.  She is not high enough to require medication but will work on diet and weight loss going forward  Orders:    Follow Up In [facility] - PCP - Established; Future           Advance Directives Discussion  16 - 20 minutes were spent discussing Advanced Care Planning (including a Living Will, Medical Power Of , as well as specific end of life choices and/or directives). The details of that discussion were documented in Advanced Directives Discussion section of the medical record.

## 2024-09-30 NOTE — ASSESSMENT & PLAN NOTE
Unfortunately blood pressure is above goal today even after taking it several times manually.  We will go ahead and increase the dose of her amlodipine to 10 mg and follow-up  Orders:    Follow Up In [facility] - PCP - Established; Future

## 2024-09-30 NOTE — ASSESSMENT & PLAN NOTE
A1c has increased and is now above 6.4% indicating mild diabetes.  She is not high enough to require medication but will work on diet and weight loss going forward  Orders:    Follow Up In [facility] - PCP - Established; Future

## 2024-10-01 PROBLEM — N18.30 CHRONIC KIDNEY DISEASE, STAGE III (MODERATE) (MULTI): Status: RESOLVED | Noted: 2023-06-12 | Resolved: 2024-10-01

## 2024-10-02 ENCOUNTER — OFFICE VISIT (OUTPATIENT)
Dept: INTERNAL MEDICINE | Age: 69
End: 2024-10-02
Payer: MEDICARE

## 2024-10-02 VITALS
WEIGHT: 210 LBS | RESPIRATION RATE: 16 BRPM | SYSTOLIC BLOOD PRESSURE: 130 MMHG | DIASTOLIC BLOOD PRESSURE: 76 MMHG | HEART RATE: 72 BPM | TEMPERATURE: 97.2 F | OXYGEN SATURATION: 99 % | BODY MASS INDEX: 34.41 KG/M2

## 2024-10-02 DIAGNOSIS — R20.0 ARM NUMBNESS LEFT: ICD-10-CM

## 2024-10-02 DIAGNOSIS — R07.89 CHEST PRESSURE: Primary | ICD-10-CM

## 2024-10-02 PROBLEM — E83.52 HYPERCALCEMIA: Status: ACTIVE | Noted: 2022-09-16

## 2024-10-02 PROBLEM — K21.9 GASTROESOPHAGEAL REFLUX DISEASE WITHOUT ESOPHAGITIS: Status: ACTIVE | Noted: 2023-07-06

## 2024-10-02 PROBLEM — R11.0 NAUSEA: Status: ACTIVE | Noted: 2024-10-02

## 2024-10-02 PROBLEM — E21.3 HYPERPARATHYROIDISM (HCC): Status: ACTIVE | Noted: 2022-09-16

## 2024-10-02 PROBLEM — R14.0 ABDOMINAL BLOATING: Status: ACTIVE | Noted: 2024-10-02

## 2024-10-02 PROBLEM — R10.9 ACUTE ABDOMINAL PAIN: Status: ACTIVE | Noted: 2024-10-02

## 2024-10-02 PROBLEM — E66.01 MORBID OBESITY: Status: ACTIVE | Noted: 2023-06-12

## 2024-10-02 PROBLEM — K59.00 CONSTIPATION: Status: ACTIVE | Noted: 2024-10-02

## 2024-10-02 PROBLEM — R73.03 PREDIABETES: Status: ACTIVE | Noted: 2023-06-12

## 2024-10-02 PROBLEM — N18.30 CHRONIC KIDNEY DISEASE, STAGE III (MODERATE) (HCC): Status: ACTIVE | Noted: 2023-06-12

## 2024-10-02 PROBLEM — L84 CORN: Status: ACTIVE | Noted: 2024-10-02

## 2024-10-02 PROBLEM — D64.9 ANEMIA: Status: ACTIVE | Noted: 2021-11-08

## 2024-10-02 PROCEDURE — 99213 OFFICE O/P EST LOW 20 MIN: CPT | Performed by: STUDENT IN AN ORGANIZED HEALTH CARE EDUCATION/TRAINING PROGRAM

## 2024-10-02 PROCEDURE — G8400 PT W/DXA NO RESULTS DOC: HCPCS | Performed by: STUDENT IN AN ORGANIZED HEALTH CARE EDUCATION/TRAINING PROGRAM

## 2024-10-02 PROCEDURE — 1036F TOBACCO NON-USER: CPT | Performed by: STUDENT IN AN ORGANIZED HEALTH CARE EDUCATION/TRAINING PROGRAM

## 2024-10-02 PROCEDURE — G8482 FLU IMMUNIZE ORDER/ADMIN: HCPCS | Performed by: STUDENT IN AN ORGANIZED HEALTH CARE EDUCATION/TRAINING PROGRAM

## 2024-10-02 PROCEDURE — 3017F COLORECTAL CA SCREEN DOC REV: CPT | Performed by: STUDENT IN AN ORGANIZED HEALTH CARE EDUCATION/TRAINING PROGRAM

## 2024-10-02 PROCEDURE — 1090F PRES/ABSN URINE INCON ASSESS: CPT | Performed by: STUDENT IN AN ORGANIZED HEALTH CARE EDUCATION/TRAINING PROGRAM

## 2024-10-02 PROCEDURE — 93005 ELECTROCARDIOGRAM TRACING: CPT | Performed by: STUDENT IN AN ORGANIZED HEALTH CARE EDUCATION/TRAINING PROGRAM

## 2024-10-02 PROCEDURE — G8417 CALC BMI ABV UP PARAM F/U: HCPCS | Performed by: STUDENT IN AN ORGANIZED HEALTH CARE EDUCATION/TRAINING PROGRAM

## 2024-10-02 PROCEDURE — G8427 DOCREV CUR MEDS BY ELIG CLIN: HCPCS | Performed by: STUDENT IN AN ORGANIZED HEALTH CARE EDUCATION/TRAINING PROGRAM

## 2024-10-02 PROCEDURE — 93010 ELECTROCARDIOGRAM REPORT: CPT | Performed by: STUDENT IN AN ORGANIZED HEALTH CARE EDUCATION/TRAINING PROGRAM

## 2024-10-02 PROCEDURE — 1123F ACP DISCUSS/DSCN MKR DOCD: CPT | Performed by: STUDENT IN AN ORGANIZED HEALTH CARE EDUCATION/TRAINING PROGRAM

## 2024-10-02 ASSESSMENT — PATIENT HEALTH QUESTIONNAIRE - PHQ9: DEPRESSION UNABLE TO ASSESS: URGENT/EMERGENT SITUATION

## 2024-10-02 NOTE — ASSESSMENT & PLAN NOTE
No other neurologic deficits  Less likely to be nerve entrapement  No other joint complaints (neck or elbow pain)  Possibly r/t chest complaints, see above as pt has been recommended for emergent eval  Close PCP f/u, consider following up with her Neurologist.

## 2024-10-02 NOTE — PROGRESS NOTES
contact me to clarify.    An electronic signature was used to authenticate this note.    --Kassy Peters, DO

## 2024-10-02 NOTE — ASSESSMENT & PLAN NOTE
Atypical sounding chest pressure    Less likely costochondritis   Possibly GERD however she is on PPI    Given her age, sex, RF of being pre-diabetic, I recommended patient to be evaluated in ED to r/o acute ischemia    She needs close PCP follow up

## 2024-10-08 ENCOUNTER — PATIENT OUTREACH (OUTPATIENT)
Dept: PRIMARY CARE | Facility: CLINIC | Age: 69
End: 2024-10-08
Payer: MEDICARE

## 2024-10-08 NOTE — PROGRESS NOTES
Discharge Facility: [facility]  Discharge Diagnosis: Chest pain  Admission Date: 10/2/2024  Discharge Date: 10/5/2024    PCP Appointment Date:  -Unable to schedule d/t no contact; task sent to office to assist with follow up  -Next appt 12/30/2024 1100    Hospital Encounter and Summary Linked: Yes    Unable to reach patient x3 attempts, voicemail left with call back number.

## 2024-10-14 ENCOUNTER — TELEPHONE (OUTPATIENT)
Dept: PRIMARY CARE | Facility: CLINIC | Age: 69
End: 2024-10-14
Payer: MEDICARE

## 2024-10-14 ENCOUNTER — OFFICE VISIT (OUTPATIENT)
Dept: FAMILY MEDICINE CLINIC | Age: 69
End: 2024-10-14
Payer: MEDICARE

## 2024-10-14 VITALS
HEART RATE: 64 BPM | WEIGHT: 210 LBS | DIASTOLIC BLOOD PRESSURE: 82 MMHG | BODY MASS INDEX: 33.75 KG/M2 | SYSTOLIC BLOOD PRESSURE: 138 MMHG | HEIGHT: 66 IN | TEMPERATURE: 97.2 F | OXYGEN SATURATION: 98 %

## 2024-10-14 DIAGNOSIS — H10.9 CONJUNCTIVITIS OF BOTH EYES, UNSPECIFIED CONJUNCTIVITIS TYPE: Primary | ICD-10-CM

## 2024-10-14 PROCEDURE — 99213 OFFICE O/P EST LOW 20 MIN: CPT | Performed by: NURSE PRACTITIONER

## 2024-10-14 PROCEDURE — 1090F PRES/ABSN URINE INCON ASSESS: CPT | Performed by: NURSE PRACTITIONER

## 2024-10-14 PROCEDURE — G8400 PT W/DXA NO RESULTS DOC: HCPCS | Performed by: NURSE PRACTITIONER

## 2024-10-14 PROCEDURE — G8482 FLU IMMUNIZE ORDER/ADMIN: HCPCS | Performed by: NURSE PRACTITIONER

## 2024-10-14 PROCEDURE — 3017F COLORECTAL CA SCREEN DOC REV: CPT | Performed by: NURSE PRACTITIONER

## 2024-10-14 PROCEDURE — 1123F ACP DISCUSS/DSCN MKR DOCD: CPT | Performed by: NURSE PRACTITIONER

## 2024-10-14 PROCEDURE — G8427 DOCREV CUR MEDS BY ELIG CLIN: HCPCS | Performed by: NURSE PRACTITIONER

## 2024-10-14 PROCEDURE — G8417 CALC BMI ABV UP PARAM F/U: HCPCS | Performed by: NURSE PRACTITIONER

## 2024-10-14 PROCEDURE — 1036F TOBACCO NON-USER: CPT | Performed by: NURSE PRACTITIONER

## 2024-10-14 RX ORDER — AMLODIPINE BESYLATE 10 MG/1
10 TABLET ORAL DAILY
COMMUNITY
Start: 2024-09-30

## 2024-10-14 RX ORDER — NITROGLYCERIN 0.4 MG/1
0.4 TABLET SUBLINGUAL EVERY 5 MIN PRN
COMMUNITY
Start: 2024-10-05 | End: 2024-11-04

## 2024-10-14 RX ORDER — CLOPIDOGREL BISULFATE 75 MG/1
75 TABLET ORAL DAILY
COMMUNITY
Start: 2024-10-11

## 2024-10-14 RX ORDER — ASPIRIN 81 MG/1
81 TABLET ORAL DAILY
COMMUNITY
Start: 2024-10-06 | End: 2024-11-05

## 2024-10-14 RX ORDER — LOSARTAN POTASSIUM 50 MG/1
50 TABLET ORAL DAILY
COMMUNITY
Start: 2024-10-11

## 2024-10-14 RX ORDER — PANTOPRAZOLE SODIUM 40 MG/1
40 TABLET, DELAYED RELEASE ORAL DAILY
COMMUNITY
Start: 2024-09-23

## 2024-10-14 RX ORDER — OFLOXACIN 3 MG/ML
1 SOLUTION/ DROPS OPHTHALMIC 4 TIMES DAILY
Qty: 1 EACH | Refills: 0 | Status: SHIPPED | OUTPATIENT
Start: 2024-10-14 | End: 2024-10-21

## 2024-10-14 RX ORDER — ATORVASTATIN CALCIUM 40 MG/1
40 TABLET, FILM COATED ORAL NIGHTLY
COMMUNITY
Start: 2024-10-05 | End: 2024-11-04

## 2024-10-14 ASSESSMENT — ENCOUNTER SYMPTOMS
EYE DISCHARGE: 1
COUGH: 0
WHEEZING: 0
EYE PAIN: 0
EYE ITCHING: 1
SHORTNESS OF BREATH: 0
FOREIGN BODY SENSATION: 0
EYE REDNESS: 1
DOUBLE VISION: 0
PHOTOPHOBIA: 0
BLURRED VISION: 0

## 2024-10-14 ASSESSMENT — PATIENT HEALTH QUESTIONNAIRE - PHQ9
1. LITTLE INTEREST OR PLEASURE IN DOING THINGS: NOT AT ALL
SUM OF ALL RESPONSES TO PHQ QUESTIONS 1-9: 0
SUM OF ALL RESPONSES TO PHQ QUESTIONS 1-9: 0
2. FEELING DOWN, DEPRESSED OR HOPELESS: NOT AT ALL
SUM OF ALL RESPONSES TO PHQ QUESTIONS 1-9: 0
SUM OF ALL RESPONSES TO PHQ9 QUESTIONS 1 & 2: 0
SUM OF ALL RESPONSES TO PHQ QUESTIONS 1-9: 0

## 2024-10-14 NOTE — PROGRESS NOTES
Subjective:      Patient ID: Rekha Russ is a 69 y.o. female who presents today for:  Chief Complaint   Patient presents with    Eye Problem     Feels like she has pink eye.  Started yesterday, eye is stuck together when she wakes up and itching. Feels like there is something in her eye.  Was hospitalized for 3 days, said she probably picked something up there        Eye Problem   Both eyes are affected. This is a new problem. Episode onset: 2 days ago. The problem occurs constantly. The problem has been unchanged. There was no injury mechanism. The patient is experiencing no pain. She Does not wear contacts. Associated symptoms include an eye discharge, eye redness and itching. Pertinent negatives include no blurred vision, double vision, fever, foreign body sensation, photophobia or recent URI. She has tried nothing for the symptoms.       Past Medical History:   Diagnosis Date    Hypertension     Trigeminal neuralgia pain      Past Surgical History:   Procedure Laterality Date    HYSTERECTOMY (CERVIX STATUS UNKNOWN)      TONSILLECTOMY       History reviewed. No pertinent family history.  Allergies   Allergen Reactions    Bactrim [Sulfamethoxazole-Trimethoprim]     Seasonal      SNEEZING         Review of Systems   Constitutional:  Negative for chills, fatigue and fever.   Eyes:  Positive for discharge, redness and itching. Negative for blurred vision, double vision, photophobia, pain and visual disturbance.   Respiratory:  Negative for cough, shortness of breath and wheezing.    Cardiovascular:  Negative for chest pain.       Objective:   /82   Pulse 64   Temp 97.2 °F (36.2 °C)   Ht 1.664 m (5' 5.5\")   Wt 95.3 kg (210 lb)   LMP  (LMP Unknown)   SpO2 98%   BMI 34.41 kg/m²     Physical Exam  Vitals reviewed.   Constitutional:       General: She is not in acute distress.     Appearance: She is well-developed.   Eyes:      General: Lids are normal.      Extraocular Movements: Extraocular

## 2024-10-15 RX ORDER — ATORVASTATIN CALCIUM 40 MG/1
1 TABLET, FILM COATED ORAL NIGHTLY
COMMUNITY
Start: 2024-10-05 | End: 2024-11-04

## 2024-10-15 RX ORDER — LISINOPRIL 20 MG/1
20 TABLET ORAL
COMMUNITY
Start: 2024-10-06 | End: 2024-10-17 | Stop reason: SINTOL

## 2024-10-15 RX ORDER — NITROGLYCERIN 0.4 MG/1
1 TABLET SUBLINGUAL EVERY 5 MIN PRN
COMMUNITY
Start: 2024-10-05 | End: 2024-11-04

## 2024-10-15 RX ORDER — LOSARTAN POTASSIUM 50 MG/1
1 TABLET ORAL DAILY
COMMUNITY
Start: 2024-10-11

## 2024-10-15 RX ORDER — CLOPIDOGREL BISULFATE 75 MG/1
1 TABLET ORAL DAILY
COMMUNITY
Start: 2024-10-11

## 2024-10-17 ENCOUNTER — APPOINTMENT (OUTPATIENT)
Dept: PRIMARY CARE | Facility: CLINIC | Age: 69
End: 2024-10-17
Payer: MEDICARE

## 2024-10-17 VITALS
RESPIRATION RATE: 16 BRPM | TEMPERATURE: 97.9 F | HEIGHT: 61 IN | HEART RATE: 60 BPM | SYSTOLIC BLOOD PRESSURE: 112 MMHG | OXYGEN SATURATION: 97 % | DIASTOLIC BLOOD PRESSURE: 69 MMHG | BODY MASS INDEX: 38.14 KG/M2 | WEIGHT: 202 LBS

## 2024-10-17 DIAGNOSIS — I25.10 CORONARY ARTERY DISEASE INVOLVING NATIVE CORONARY ARTERY OF NATIVE HEART WITHOUT ANGINA PECTORIS: Primary | ICD-10-CM

## 2024-10-17 DIAGNOSIS — R05.9 COUGH, UNSPECIFIED TYPE: ICD-10-CM

## 2024-10-17 LAB — POC SARS-COV-2 AG BINAX: NORMAL

## 2024-10-17 PROCEDURE — 1123F ACP DISCUSS/DSCN MKR DOCD: CPT | Performed by: FAMILY MEDICINE

## 2024-10-17 PROCEDURE — 1036F TOBACCO NON-USER: CPT | Performed by: FAMILY MEDICINE

## 2024-10-17 PROCEDURE — 3008F BODY MASS INDEX DOCD: CPT | Performed by: FAMILY MEDICINE

## 2024-10-17 PROCEDURE — 4010F ACE/ARB THERAPY RXD/TAKEN: CPT | Performed by: FAMILY MEDICINE

## 2024-10-17 PROCEDURE — 87811 SARS-COV-2 COVID19 W/OPTIC: CPT | Performed by: FAMILY MEDICINE

## 2024-10-17 PROCEDURE — 99495 TRANSJ CARE MGMT MOD F2F 14D: CPT | Performed by: FAMILY MEDICINE

## 2024-10-17 PROCEDURE — 3074F SYST BP LT 130 MM HG: CPT | Performed by: FAMILY MEDICINE

## 2024-10-17 PROCEDURE — 3078F DIAST BP <80 MM HG: CPT | Performed by: FAMILY MEDICINE

## 2024-10-17 PROCEDURE — 1159F MED LIST DOCD IN RCRD: CPT | Performed by: FAMILY MEDICINE

## 2024-10-17 ASSESSMENT — ENCOUNTER SYMPTOMS
HEADACHES: 0
FEVER: 0
COUGH: 1
RHINORRHEA: 1
SHORTNESS OF BREATH: 1

## 2024-10-17 NOTE — PROGRESS NOTES
Subjective   Willa Fox is a 69 y.o. female who presents for Hospital Follow-up and Cough.  She was admitted to the hospital with chest pain which was determined to be angina.  A heart catheterization revealed a single stenotic lesion which was treated with angioplasty.  Brintellix and high intensity statin were initiated.  Her symptoms have resolved.  She is having some mild shortness of breath which she feels is a side effect from the new medication but is otherwise feeling good.  Cough  Episode onset: about 10 days ago. The problem has been unchanged. The cough is Productive of sputum. Associated symptoms include nasal congestion, rhinorrhea and shortness of breath. Pertinent negatives include no fever or headaches.            She was admitted to [facility] from 10/2/24 - 10/5/24 for chest pain.  Visit Vitals  /69   Pulse 60   Temp 36.6 °C (97.9 °F)   Resp 16      Objective   Physical Exam  Constitutional:       Appearance: Normal appearance.   HENT:      Head: Normocephalic.   Eyes:      Conjunctiva/sclera: Conjunctivae normal.   Cardiovascular:      Rate and Rhythm: Normal rate and regular rhythm.      Heart sounds: Normal heart sounds.   Pulmonary:      Effort: Pulmonary effort is normal.      Breath sounds: Normal breath sounds.   Musculoskeletal:      Cervical back: Neck supple.   Skin:     General: Skin is warm and dry.   Neurological:      Mental Status: She is alert.            Assessment & Plan  Coronary artery disease involving native coronary artery of native heart without angina pectoris  This 69-year-old female was admitted for acute coronary syndrome and fortunately did not have any myocardial ischemia.  She had a positive nuclear stress test and a heart catheterization resulting in a single angioplasty which was successful.  She was started on Brintellix which may be causing a little bit of shortness of breath.  She is planning to take this for 1 month and then  transition to Plavix and aspirin.  She is already taking a high intensity statin.  Her blood pressure is under extremely good control and her diabetes is under very good control with an A1c of 6.6%.  I did review extensive hospital records, lab results, stress test results, and procedure results at the time of this visit today with the patient.       Cough, unspecified type  COVID-19 swab is negative and the patient is nontoxic-appearing.  There is no evidence of pneumonia or any other focal illness so I believe this is a mild upper respiratory virus.  I recommended symptomatic control treatments and follow-up if not resolving  Orders:    POCT BinaxNOW Covid-19 Ag Card manually resulted           Please excuse any errors in grammar or translation related to this dictation. Voice recognition software was utilized to prepare this document.

## 2024-10-18 ENCOUNTER — PATIENT OUTREACH (OUTPATIENT)
Dept: PRIMARY CARE | Facility: CLINIC | Age: 69
End: 2024-10-18
Payer: MEDICARE

## 2024-10-18 NOTE — PROGRESS NOTES
Call regarding appt. with PCP on 10/17/2024 after hospitalization.  At time of outreach call the patient feels as if their condition has improved aside from feeling a little tired since last visit.  Reviewed the PCP appointment with the pt and addressed any questions or concerns.    -Verified next PCP appt 12/30/2024 1100    Pt had follow up with specialists. Pt denies questions regarding appts. Pt denies any questions or issues with medication.    Pt denies any questions or concerns at this time. She is encouraged to call if questions or needs arise.

## 2024-11-18 DIAGNOSIS — E78.5 HYPERLIPIDEMIA, UNSPECIFIED HYPERLIPIDEMIA TYPE: ICD-10-CM

## 2024-11-18 RX ORDER — ATORVASTATIN CALCIUM 40 MG/1
40 TABLET, FILM COATED ORAL DAILY
COMMUNITY
End: 2024-11-18 | Stop reason: SDUPTHER

## 2024-11-18 RX ORDER — ATORVASTATIN CALCIUM 40 MG/1
40 TABLET, FILM COATED ORAL DAILY
Qty: 90 TABLET | Refills: 3 | Status: SHIPPED | OUTPATIENT
Start: 2024-11-18

## 2024-11-19 ENCOUNTER — PATIENT OUTREACH (OUTPATIENT)
Dept: PRIMARY CARE | Facility: CLINIC | Age: 69
End: 2024-11-19
Payer: MEDICARE

## 2024-11-19 NOTE — PROGRESS NOTES
Unable to reach patient for discharge follow up call.   Left voicemail with call back number for patient to call if needed   If no voicemail available call attempts x 2 were made to contact the patient to assist with any questions or concerns patient may have.

## 2024-12-02 ENCOUNTER — APPOINTMENT (OUTPATIENT)
Dept: RADIOLOGY | Facility: HOSPITAL | Age: 69
End: 2024-12-02
Payer: MEDICARE

## 2024-12-02 ENCOUNTER — TELEPHONE (OUTPATIENT)
Dept: PRIMARY CARE | Facility: CLINIC | Age: 69
End: 2024-12-02
Payer: MEDICARE

## 2024-12-02 ENCOUNTER — HOSPITAL ENCOUNTER (EMERGENCY)
Facility: HOSPITAL | Age: 69
Discharge: HOME | End: 2024-12-02
Payer: MEDICARE

## 2024-12-02 ENCOUNTER — APPOINTMENT (OUTPATIENT)
Dept: ORTHOPEDIC SURGERY | Facility: CLINIC | Age: 69
End: 2024-12-02
Payer: MEDICARE

## 2024-12-02 VITALS
DIASTOLIC BLOOD PRESSURE: 81 MMHG | WEIGHT: 200 LBS | BODY MASS INDEX: 37.76 KG/M2 | TEMPERATURE: 98.6 F | HEIGHT: 61 IN | HEART RATE: 74 BPM | RESPIRATION RATE: 18 BRPM | SYSTOLIC BLOOD PRESSURE: 195 MMHG | OXYGEN SATURATION: 98 %

## 2024-12-02 DIAGNOSIS — M25.561 ACUTE PAIN OF RIGHT KNEE: Primary | ICD-10-CM

## 2024-12-02 PROCEDURE — 2500000005 HC RX 250 GENERAL PHARMACY W/O HCPCS: Performed by: NURSE PRACTITIONER

## 2024-12-02 PROCEDURE — 73564 X-RAY EXAM KNEE 4 OR MORE: CPT | Mod: RT

## 2024-12-02 PROCEDURE — 99283 EMERGENCY DEPT VISIT LOW MDM: CPT

## 2024-12-02 PROCEDURE — 73564 X-RAY EXAM KNEE 4 OR MORE: CPT | Mod: RIGHT SIDE | Performed by: RADIOLOGY

## 2024-12-02 PROCEDURE — 2500000001 HC RX 250 WO HCPCS SELF ADMINISTERED DRUGS (ALT 637 FOR MEDICARE OP): Performed by: NURSE PRACTITIONER

## 2024-12-02 RX ORDER — LIDOCAINE 560 MG/1
1 PATCH PERCUTANEOUS; TOPICAL; TRANSDERMAL ONCE
Status: DISCONTINUED | OUTPATIENT
Start: 2024-12-02 | End: 2024-12-02 | Stop reason: HOSPADM

## 2024-12-02 RX ORDER — ACETAMINOPHEN 325 MG/1
975 TABLET ORAL ONCE
Status: COMPLETED | OUTPATIENT
Start: 2024-12-02 | End: 2024-12-02

## 2024-12-02 ASSESSMENT — PAIN DESCRIPTION - ORIENTATION: ORIENTATION: RIGHT

## 2024-12-02 ASSESSMENT — PAIN SCALES - GENERAL
PAINLEVEL_OUTOF10: 3
PAINLEVEL_OUTOF10: 9

## 2024-12-02 ASSESSMENT — COLUMBIA-SUICIDE SEVERITY RATING SCALE - C-SSRS
1. IN THE PAST MONTH, HAVE YOU WISHED YOU WERE DEAD OR WISHED YOU COULD GO TO SLEEP AND NOT WAKE UP?: NO
6. HAVE YOU EVER DONE ANYTHING, STARTED TO DO ANYTHING, OR PREPARED TO DO ANYTHING TO END YOUR LIFE?: NO
2. HAVE YOU ACTUALLY HAD ANY THOUGHTS OF KILLING YOURSELF?: NO

## 2024-12-02 ASSESSMENT — PAIN DESCRIPTION - LOCATION: LOCATION: KNEE

## 2024-12-02 ASSESSMENT — PAIN - FUNCTIONAL ASSESSMENT
PAIN_FUNCTIONAL_ASSESSMENT: 0-10
PAIN_FUNCTIONAL_ASSESSMENT: 0-10

## 2024-12-02 ASSESSMENT — PAIN DESCRIPTION - PAIN TYPE: TYPE: ACUTE PAIN

## 2024-12-02 ASSESSMENT — PAIN DESCRIPTION - DESCRIPTORS: DESCRIPTORS: SHARP

## 2024-12-02 NOTE — ED PROVIDER NOTES
HPI   Chief Complaint   Patient presents with    Knee Pain         History provided by:  Patient   used: No      69-year-old female presents ED via EMS for evaluation of sudden onset right knee pain onset prior to arrival.  Patient states that she was in her bed when she rolled over and felt a pop and sudden onset pain of the knee.  States that she was unable to bear weight due to the pain.  Took no medications prior to arrival for symptoms.  She is endorsing limited range of motion to the knee due to pain but denies any loss of sensation.  Denies any additional trauma, fall, injury or anticoagulation use.  Denies any numbness, tingling, weakness.  She is noted to be hypertensive in the 200s upon ED arrival, denies any headache, vision changes, dizziness, chest pain, shortness of breath, back pain, abdominal pain and states that she does have whitecoat hypertension.  Denies any additional symptoms or complaints at this time.    ROS is otherwise negative unless stated above.    Patient History   No past medical history on file.  Past Surgical History:   Procedure Laterality Date    HYSTERECTOMY      fibroids    TONSILLECTOMY       No family history on file.  Social History     Tobacco Use    Smoking status: Former     Current packs/day: 0.00     Average packs/day: 1 pack/day for 35.0 years (35.0 ttl pk-yrs)     Types: Cigarettes     Start date:      Quit date:      Years since quittin.9    Smokeless tobacco: Never   Substance Use Topics    Alcohol use: Never    Drug use: Never       Physical Exam   ED Triage Vitals [24 0017]   Temperature Heart Rate Respirations BP   36.9 °C (98.4 °F) 78 18 (!) 239/103      Pulse Ox Temp Source Heart Rate Source Patient Position   97 % Temporal Monitor Lying      BP Location FiO2 (%)     Left arm --       Physical Exam    VS: As documented in the triage note and EMR flowsheet from this visit were reviewed.    GEN: NAD, nontoxic, well-appearing,  resting comfortably in ED cart without difficulty or dyspnea  EYES: PERRLA  HEENT: Airway patent  CARD: RRR, nontender chest, no crepitus deformities, no JVD, no murmurs rubs or gallops ; No edema noted.  Positive pulses bilaterally throughout.  Capillary refill less than 3 seconds.  No abnormal redness, warmth, tenderness or swelling noted to bilateral lower extremities.  MUSK: Spine appears normal, range of motion is limited to the right knee due to pain.  Positive tenderness to palpation of the lateral right knee.  Strength 5 out of 5 equal bilaterally throughout.  No step-offs, deformities or additional signs of trauma noted.  No spinal/midline tenderness to palpation  SKIN: Skin normal color for race, warm, dry and intact. No evidence of trauma. No rash noted.  NEURO: Alert and oriented x 3, speech is clear, no obvious deficits noted. No facial droop noted.   ED Course & MDM   Diagnoses as of 12/02/24 0108   Acute pain of right knee                 No data recorded     [place] Coma Scale Score: 15 (12/02/24 0021 : Zayanb Blue RN)                           Medical Decision Making    upon assessment patient was a healthy non-toxic appearing female in no apparent distress. Patient ambulating independently in the emergency department without difficulty. Patient was neurovascularly intact with limited range of motion to the right knee due to pain. Positive tenderness to palpation of the lateral right knee. Assessment otherwise benign.  See physical exam section for my assessment.  Physical exam concerning for but not limited to musculoskeletal pain vs acute traumatic injury versus ligament injury. Due to prior history and current physical exam, I ordered an x-ray right knee to assess for any acute traumatic injury which was unremarkable except for mild osteoarthritis and a small joint effusion, this does not need to be drained at this time additionally patient is on Plavix.  See results review.  She was given  Tylenol p.o. and lidocaine patch with improvement of her pain.  She was initially hypertensive upon ED arrival, asymptomatic, no indication for workup at this time as I have no suspicion for ACS, hypertensive crisis, stroke, etc.  This is consistent with her whitecoat hypertension and this improved throughout her ED course of care.  patient remained otherwise hemodynamically stable throughout ED visit.  Previous consult/ED visit notes were reviewed. findings were discussed with patient and patient agreeable for plan to discharge home with primary care provider follow up/orthopedic clinic follow up per referral in one week for further management and to continue Tylenol by mouth at home as needed for pain. Patient  was educated on the rice protocol and weightbearing as tolerated. provided a knee immobilizer and a walker with RN education at bedside.  She does live with her daughter who will help take care of her, only has to go up 2 stairs and should be okay at home.  Return precautions discussed and patient acknowledged understanding.  All questions and concerns answered prior to discharge.                   *Please note that portions of this note may have been completed with a voice recognition program.  Efforts were made to edit the dictations but occasionally, words are mis-transcribed.  Procedure  Procedures     Willa Santizo, GABBY-CNP  12/02/24 0109

## 2024-12-02 NOTE — TELEPHONE ENCOUNTER
"Pt called asking if she should be taking Atorvastatin Calcium? She said her previous prescription did not say \"Calcium\" on it and she is concerned because she has elevated calcium levels. She also asked if can switch from Atorvastatin to Simvastatin? She read that one of the side effects is a cough and she has had a chronic cough for some time and would like to see if it gets better if she switches medications. Please advise.  "

## 2024-12-02 NOTE — ED TRIAGE NOTES
"Patient arrives via EMS from home with complaints of right knee pain. Patient states she rolled over in bed and felt a \"popping\" sensation in her lateral right knee and has had a 9/10 sharp pain since. Patient states she's able to slightly bend and straighten her knee, but unable to bear weight. MSP intact in right lower extremity. No obvious deformity noted. Some swelling to right lateral knee. Denies numbness or tingling. Patient notably hypertensive for EMS and upon arrival to ED. Patient denies chest pain, dizziness, blurred vision, headache.   "

## 2024-12-20 ENCOUNTER — PATIENT OUTREACH (OUTPATIENT)
Dept: PRIMARY CARE | Facility: CLINIC | Age: 69
End: 2024-12-20
Payer: MEDICARE

## 2024-12-24 ENCOUNTER — APPOINTMENT (OUTPATIENT)
Dept: GENERAL RADIOLOGY | Age: 69
End: 2024-12-24
Payer: MEDICARE

## 2024-12-24 ENCOUNTER — HOSPITAL ENCOUNTER (EMERGENCY)
Age: 69
Discharge: HOME | End: 2024-12-24
Attending: EMERGENCY MEDICINE
Payer: MEDICARE

## 2024-12-24 VITALS
TEMPERATURE: 98.1 F | DIASTOLIC BLOOD PRESSURE: 70 MMHG | OXYGEN SATURATION: 95 % | HEART RATE: 68 BPM | BODY MASS INDEX: 30.05 KG/M2 | SYSTOLIC BLOOD PRESSURE: 155 MMHG | HEIGHT: 66 IN | RESPIRATION RATE: 11 BRPM | WEIGHT: 187 LBS

## 2024-12-24 DIAGNOSIS — R07.89 ATYPICAL CHEST PAIN: Primary | ICD-10-CM

## 2024-12-24 DIAGNOSIS — E83.52 HYPERCALCEMIA: ICD-10-CM

## 2024-12-24 DIAGNOSIS — N39.0 ACUTE UTI: ICD-10-CM

## 2024-12-24 DIAGNOSIS — K21.00 GASTROESOPHAGEAL REFLUX DISEASE WITH ESOPHAGITIS WITHOUT HEMORRHAGE: ICD-10-CM

## 2024-12-24 LAB
ALBUMIN SERPL-MCNC: 4.6 G/DL (ref 3.5–4.6)
ALP SERPL-CCNC: 119 U/L (ref 40–130)
ALT SERPL-CCNC: 20 U/L (ref 0–33)
ANION GAP SERPL CALCULATED.3IONS-SCNC: 12 MEQ/L (ref 9–15)
AST SERPL-CCNC: 20 U/L (ref 0–35)
BACTERIA URNS QL MICRO: ABNORMAL /HPF
BASOPHILS # BLD: 0 K/UL (ref 0–0.1)
BASOPHILS NFR BLD: 0.7 % (ref 0.1–1.2)
BILIRUB SERPL-MCNC: 0.5 MG/DL (ref 0.2–0.7)
BILIRUB UR QL STRIP: NEGATIVE
BNP BLD-MCNC: 316 PG/ML
BUN SERPL-MCNC: 21 MG/DL (ref 8–23)
CALCIUM SERPL-MCNC: 11 MG/DL (ref 8.5–9.9)
CHLORIDE SERPL-SCNC: 102 MEQ/L (ref 95–107)
CLARITY UR: NORMAL
CO2 SERPL-SCNC: 26 MEQ/L (ref 20–31)
COLOR UR: NORMAL
CREAT SERPL-MCNC: 0.64 MG/DL (ref 0.5–0.9)
EKG ATRIAL RATE: 69 BPM
EKG ATRIAL RATE: 83 BPM
EKG P AXIS: 35 DEGREES
EKG P AXIS: 57 DEGREES
EKG P-R INTERVAL: 218 MS
EKG P-R INTERVAL: 218 MS
EKG Q-T INTERVAL: 390 MS
EKG Q-T INTERVAL: 416 MS
EKG QRS DURATION: 92 MS
EKG QRS DURATION: 94 MS
EKG QTC CALCULATION (BAZETT): 445 MS
EKG QTC CALCULATION (BAZETT): 458 MS
EKG R AXIS: -4 DEGREES
EKG R AXIS: 1 DEGREES
EKG T AXIS: 44 DEGREES
EKG T AXIS: 52 DEGREES
EKG VENTRICULAR RATE: 69 BPM
EKG VENTRICULAR RATE: 83 BPM
EOSINOPHIL # BLD: 0.1 K/UL (ref 0–0.4)
EOSINOPHIL NFR BLD: 2.1 % (ref 0.7–5.8)
EPI CELLS #/AREA URNS HPF: ABNORMAL /HPF
ERYTHROCYTE [DISTWIDTH] IN BLOOD BY AUTOMATED COUNT: 12.4 % (ref 11.7–14.4)
GLOBULIN SER CALC-MCNC: 3.3 G/DL (ref 2.3–3.5)
GLUCOSE SERPL-MCNC: 143 MG/DL (ref 70–99)
GLUCOSE UR STRIP-MCNC: NEGATIVE MG/DL
HCT VFR BLD AUTO: 39.2 % (ref 37–47)
HGB BLD-MCNC: 13.3 G/DL (ref 11.2–15.7)
HGB UR QL STRIP: NEGATIVE
IMM GRANULOCYTES # BLD: 0 K/UL
IMM GRANULOCYTES NFR BLD: 0.2 %
INR PPP: 0.9
KETONES UR STRIP-MCNC: NEGATIVE MG/DL
LEUKOCYTE ESTERASE UR QL STRIP: NORMAL
LYMPHOCYTES # BLD: 2.1 K/UL (ref 1.2–3.7)
LYMPHOCYTES NFR BLD: 36.3 %
MAGNESIUM SERPL-MCNC: 2 MG/DL (ref 1.7–2.4)
MCH RBC QN AUTO: 31.3 PG (ref 25.6–32.2)
MCHC RBC AUTO-ENTMCNC: 33.9 % (ref 32.2–35.5)
MCV RBC AUTO: 92.2 FL (ref 79.4–94.8)
MONOCYTES # BLD: 0.6 K/UL (ref 0.2–0.9)
MONOCYTES NFR BLD: 10.2 % (ref 4.7–12.5)
NEUTROPHILS # BLD: 2.9 K/UL (ref 1.6–6.1)
NEUTS SEG NFR BLD: 50.5 % (ref 34–71.1)
NITRITE UR QL STRIP: NEGATIVE
PH UR STRIP: 7 [PH] (ref 5–9)
PLATELET # BLD AUTO: 210 K/UL (ref 182–369)
POTASSIUM SERPL-SCNC: 3.7 MEQ/L (ref 3.4–4.9)
PROT SERPL-MCNC: 7.9 G/DL (ref 6.3–8)
PROT UR STRIP-MCNC: NEGATIVE MG/DL
PROTHROMBIN TIME: 12.8 SEC (ref 12.3–14.9)
PTH-INTACT SERPL-MCNC: 91.2 PG/ML (ref 15–65)
RBC # BLD AUTO: 4.25 M/UL (ref 3.93–5.22)
RBC #/AREA URNS HPF: ABNORMAL /HPF (ref 0–2)
SODIUM SERPL-SCNC: 140 MEQ/L (ref 135–144)
SP GR UR STRIP: 1.01 (ref 1–1.03)
TROPONIN, HIGH SENSITIVITY: 11 NG/L (ref 0–19)
TROPONIN, HIGH SENSITIVITY: 12 NG/L (ref 0–19)
UROBILINOGEN UR STRIP-ACNC: 0.2 E.U./DL
WBC # BLD AUTO: 5.7 K/UL (ref 4–10)
WBC #/AREA URNS HPF: ABNORMAL /HPF (ref 0–5)

## 2024-12-24 PROCEDURE — 6370000000 HC RX 637 (ALT 250 FOR IP): Performed by: EMERGENCY MEDICINE

## 2024-12-24 PROCEDURE — 87086 URINE CULTURE/COLONY COUNT: CPT

## 2024-12-24 PROCEDURE — 6360000002 HC RX W HCPCS: Performed by: EMERGENCY MEDICINE

## 2024-12-24 PROCEDURE — 83970 ASSAY OF PARATHORMONE: CPT

## 2024-12-24 PROCEDURE — 85025 COMPLETE CBC W/AUTO DIFF WBC: CPT

## 2024-12-24 PROCEDURE — 80053 COMPREHEN METABOLIC PANEL: CPT

## 2024-12-24 PROCEDURE — 82330 ASSAY OF CALCIUM: CPT

## 2024-12-24 PROCEDURE — 71045 X-RAY EXAM CHEST 1 VIEW: CPT

## 2024-12-24 PROCEDURE — 36415 COLL VENOUS BLD VENIPUNCTURE: CPT

## 2024-12-24 PROCEDURE — 83735 ASSAY OF MAGNESIUM: CPT

## 2024-12-24 PROCEDURE — 81001 URINALYSIS AUTO W/SCOPE: CPT

## 2024-12-24 PROCEDURE — 84484 ASSAY OF TROPONIN QUANT: CPT

## 2024-12-24 PROCEDURE — 93010 ELECTROCARDIOGRAM REPORT: CPT | Performed by: INTERNAL MEDICINE

## 2024-12-24 PROCEDURE — 2580000003 HC RX 258: Performed by: EMERGENCY MEDICINE

## 2024-12-24 PROCEDURE — 99285 EMERGENCY DEPT VISIT HI MDM: CPT

## 2024-12-24 PROCEDURE — 93005 ELECTROCARDIOGRAM TRACING: CPT

## 2024-12-24 PROCEDURE — 96375 TX/PRO/DX INJ NEW DRUG ADDON: CPT

## 2024-12-24 PROCEDURE — 96374 THER/PROPH/DIAG INJ IV PUSH: CPT

## 2024-12-24 PROCEDURE — 83880 ASSAY OF NATRIURETIC PEPTIDE: CPT

## 2024-12-24 PROCEDURE — 85610 PROTHROMBIN TIME: CPT

## 2024-12-24 RX ORDER — NITROFURANTOIN 25; 75 MG/1; MG/1
100 CAPSULE ORAL ONCE
Status: COMPLETED | OUTPATIENT
Start: 2024-12-24 | End: 2024-12-24

## 2024-12-24 RX ORDER — DEXAMETHASONE 4 MG/1
4 TABLET ORAL 2 TIMES DAILY WITH MEALS
Qty: 20 TABLET | Refills: 0 | Status: ON HOLD | OUTPATIENT
Start: 2024-12-24 | End: 2024-12-28 | Stop reason: HOSPADM

## 2024-12-24 RX ORDER — FAMOTIDINE 20 MG/1
20 TABLET, FILM COATED ORAL 2 TIMES DAILY
Qty: 60 TABLET | Refills: 0 | Status: ON HOLD | OUTPATIENT
Start: 2024-12-24

## 2024-12-24 RX ORDER — DEXAMETHASONE SODIUM PHOSPHATE 10 MG/ML
10 INJECTION, SOLUTION INTRAMUSCULAR; INTRAVENOUS ONCE
Status: COMPLETED | OUTPATIENT
Start: 2024-12-24 | End: 2024-12-24

## 2024-12-24 RX ORDER — NITROFURANTOIN 25; 75 MG/1; MG/1
100 CAPSULE ORAL 2 TIMES DAILY
Qty: 14 CAPSULE | Refills: 0 | Status: ON HOLD | OUTPATIENT
Start: 2024-12-24 | End: 2024-12-28 | Stop reason: HOSPADM

## 2024-12-24 RX ORDER — KETOROLAC TROMETHAMINE 15 MG/ML
15 INJECTION, SOLUTION INTRAMUSCULAR; INTRAVENOUS ONCE
Status: COMPLETED | OUTPATIENT
Start: 2024-12-24 | End: 2024-12-24

## 2024-12-24 RX ORDER — 0.9 % SODIUM CHLORIDE 0.9 %
1000 INTRAVENOUS SOLUTION INTRAVENOUS ONCE
Status: COMPLETED | OUTPATIENT
Start: 2024-12-24 | End: 2024-12-24

## 2024-12-24 RX ADMIN — KETOROLAC TROMETHAMINE 15 MG: 15 INJECTION, SOLUTION INTRAMUSCULAR; INTRAVENOUS at 05:27

## 2024-12-24 RX ADMIN — ALUMINUM HYDROXIDE, MAGNESIUM HYDROXIDE, AND SIMETHICONE: 1200; 120; 1200 SUSPENSION ORAL at 05:33

## 2024-12-24 RX ADMIN — SODIUM CHLORIDE 1000 ML: 9 INJECTION, SOLUTION INTRAVENOUS at 04:52

## 2024-12-24 RX ADMIN — DEXAMETHASONE SODIUM PHOSPHATE 10 MG: 10 INJECTION INTRAMUSCULAR; INTRAVENOUS at 06:55

## 2024-12-24 RX ADMIN — NITROFURANTOIN MONOHYDRATE/MACROCRYSTALS 100 MG: 25; 75 CAPSULE ORAL at 06:36

## 2024-12-24 ASSESSMENT — PAIN SCALES - GENERAL
PAINLEVEL_OUTOF10: 3
PAINLEVEL_OUTOF10: 0

## 2024-12-24 ASSESSMENT — PAIN - FUNCTIONAL ASSESSMENT: PAIN_FUNCTIONAL_ASSESSMENT: 0-10

## 2024-12-24 ASSESSMENT — PAIN DESCRIPTION - LOCATION: LOCATION: CHEST

## 2024-12-24 NOTE — ED TRIAGE NOTES
Pt presents to ER with c/o chest pain/pressure x3 days. Pt had cardiac stent placed in October 2024 at [facility]

## 2024-12-24 NOTE — ED PROVIDER NOTES
[facility] ED  eMERGENCY dEPARTMENT eNCOUnter      Pt Name: Rekha Russ  MRN: [id number]  Birthdate 1955  Date of evaluation: 12/24/2024  Provider: Giovanni Denise MD    CHIEF COMPLAINT       Chief Complaint   Patient presents with   • Chest Pain     Chest pain/pressure x3 days         HISTORY OF PRESENT ILLNESS   (Location/Symptom, Timing/Onset,Context/Setting, Quality, Duration, Modifying Factors, Severity)  Note limiting factors.   Rekha Russ is a 69 y.o. female who presents to the emergency department with complaint of chest pain and pressure of 3 days duration.  Pain is on both left and right anterior chest.  It does not radiate.  It is not exertion related.  No complaint nausea or vomiting.  No dizziness.  No diaphoresis.  It is 3 on a scale of 1-10.  She is not having any pain at this time.  She had a stent placed back into October.  Extensive comorbid conditions includes chronic kidney disease stage III, constipation, gastroesophageal reflux disease, hypercalcemia, chest pressure, hyperlipidemia.    HPI    Nursing Notes were reviewed.    REVIEW OF SYSTEMS    (2-9 systems for level 4, 10 or more for level 5)     Review of Systems   Constitutional: Negative.  Negative for activity change, appetite change, chills, fatigue and fever.   HENT:  Negative for congestion, ear discharge, ear pain, hearing loss, rhinorrhea, sinus pressure and sore throat.    Eyes:  Negative for photophobia, pain and visual disturbance.   Respiratory:  Negative for apnea, cough, shortness of breath and wheezing.    Cardiovascular:  Positive for chest pain. Negative for palpitations and leg swelling.   Gastrointestinal:  Negative for abdominal distention, abdominal pain, constipation, diarrhea, nausea and vomiting.   Endocrine: Negative for cold intolerance, heat intolerance and polyuria.   Genitourinary:  Negative for dysuria, flank pain, frequency and urgency.   Musculoskeletal:  Negative for

## 2024-12-25 ENCOUNTER — APPOINTMENT (OUTPATIENT)
Dept: CT IMAGING | Age: 69
End: 2024-12-25
Payer: MEDICARE

## 2024-12-25 ENCOUNTER — APPOINTMENT (OUTPATIENT)
Dept: GENERAL RADIOLOGY | Age: 69
End: 2024-12-25
Payer: MEDICARE

## 2024-12-25 ENCOUNTER — HOSPITAL ENCOUNTER (OUTPATIENT)
Age: 69
Setting detail: OBSERVATION
Discharge: ANOTHER HOSPITAL | End: 2024-12-26
Attending: EMERGENCY MEDICINE | Admitting: INTERNAL MEDICINE
Payer: MEDICARE

## 2024-12-25 DIAGNOSIS — K59.00 CONSTIPATION, UNSPECIFIED CONSTIPATION TYPE: ICD-10-CM

## 2024-12-25 DIAGNOSIS — K21.9 GASTROESOPHAGEAL REFLUX DISEASE WITHOUT ESOPHAGITIS: ICD-10-CM

## 2024-12-25 DIAGNOSIS — K80.20 GALL STONES: Primary | ICD-10-CM

## 2024-12-25 DIAGNOSIS — I26.99 ACUTE PULMONARY EMBOLISM, UNSPECIFIED PULMONARY EMBOLISM TYPE, UNSPECIFIED WHETHER ACUTE COR PULMONALE PRESENT (HCC): ICD-10-CM

## 2024-12-25 LAB
ALBUMIN SERPL-MCNC: 5.1 G/DL (ref 3.5–4.6)
ALP SERPL-CCNC: 128 U/L (ref 40–130)
ALT SERPL-CCNC: 20 U/L (ref 0–33)
AMYLASE SERPL-CCNC: 83 U/L (ref 22–93)
ANION GAP SERPL CALCULATED.3IONS-SCNC: 11 MEQ/L (ref 9–15)
AST SERPL-CCNC: 18 U/L (ref 0–35)
BACTERIA UR CULT: NORMAL
BASOPHILS # BLD: 0 K/UL (ref 0–0.1)
BASOPHILS NFR BLD: 0.3 % (ref 0.1–1.2)
BILIRUB SERPL-MCNC: 0.4 MG/DL (ref 0.2–0.7)
BUN SERPL-MCNC: 19 MG/DL (ref 8–23)
CALCIUM SERPL-MCNC: 11.5 MG/DL (ref 8.5–9.9)
CHLORIDE SERPL-SCNC: 101 MEQ/L (ref 95–107)
CO2 SERPL-SCNC: 28 MEQ/L (ref 20–31)
CREAT SERPL-MCNC: 0.62 MG/DL (ref 0.5–0.9)
EOSINOPHIL # BLD: 0 K/UL (ref 0–0.4)
EOSINOPHIL NFR BLD: 0.1 % (ref 0.7–5.8)
ERYTHROCYTE [DISTWIDTH] IN BLOOD BY AUTOMATED COUNT: 12.3 % (ref 11.7–14.4)
GLOBULIN SER CALC-MCNC: 3.6 G/DL (ref 2.3–3.5)
GLUCOSE BLD-MCNC: 146 MG/DL (ref 70–99)
GLUCOSE SERPL-MCNC: 195 MG/DL (ref 70–99)
HCT VFR BLD AUTO: 41 % (ref 37–47)
HGB BLD-MCNC: 13.9 G/DL (ref 11.2–15.7)
IMM GRANULOCYTES # BLD: 0 K/UL
IMM GRANULOCYTES NFR BLD: 0.4 %
LIPASE SERPL-CCNC: 30 U/L (ref 12–95)
LYMPHOCYTES # BLD: 1.3 K/UL (ref 1.2–3.7)
LYMPHOCYTES NFR BLD: 16.7 %
MAGNESIUM SERPL-MCNC: 2.3 MG/DL (ref 1.7–2.4)
MCH RBC QN AUTO: 31.1 PG (ref 25.6–32.2)
MCHC RBC AUTO-ENTMCNC: 33.9 % (ref 32.2–35.5)
MCV RBC AUTO: 91.7 FL (ref 79.4–94.8)
MONOCYTES # BLD: 0.4 K/UL (ref 0.2–0.9)
MONOCYTES NFR BLD: 5.1 % (ref 4.7–12.5)
NEUTROPHILS # BLD: 5.8 K/UL (ref 1.6–6.1)
NEUTS SEG NFR BLD: 77.4 % (ref 34–71.1)
PERFORMED ON: ABNORMAL
PLATELET # BLD AUTO: 242 K/UL (ref 182–369)
POTASSIUM SERPL-SCNC: 4.1 MEQ/L (ref 3.4–4.9)
PROT SERPL-MCNC: 8.7 G/DL (ref 6.3–8)
RBC # BLD AUTO: 4.47 M/UL (ref 3.93–5.22)
SODIUM SERPL-SCNC: 140 MEQ/L (ref 135–144)
TROPONIN, HIGH SENSITIVITY: 14 NG/L (ref 0–19)
WBC # BLD AUTO: 7.5 K/UL (ref 4–10)

## 2024-12-25 PROCEDURE — 6370000000 HC RX 637 (ALT 250 FOR IP): Performed by: INTERNAL MEDICINE

## 2024-12-25 PROCEDURE — 71275 CT ANGIOGRAPHY CHEST: CPT

## 2024-12-25 PROCEDURE — 82150 ASSAY OF AMYLASE: CPT

## 2024-12-25 PROCEDURE — 96372 THER/PROPH/DIAG INJ SC/IM: CPT

## 2024-12-25 PROCEDURE — 83735 ASSAY OF MAGNESIUM: CPT

## 2024-12-25 PROCEDURE — 96375 TX/PRO/DX INJ NEW DRUG ADDON: CPT

## 2024-12-25 PROCEDURE — 80053 COMPREHEN METABOLIC PANEL: CPT

## 2024-12-25 PROCEDURE — 85025 COMPLETE CBC W/AUTO DIFF WBC: CPT

## 2024-12-25 PROCEDURE — 84484 ASSAY OF TROPONIN QUANT: CPT

## 2024-12-25 PROCEDURE — G0378 HOSPITAL OBSERVATION PER HR: HCPCS

## 2024-12-25 PROCEDURE — 96374 THER/PROPH/DIAG INJ IV PUSH: CPT

## 2024-12-25 PROCEDURE — 93005 ELECTROCARDIOGRAM TRACING: CPT

## 2024-12-25 PROCEDURE — 83690 ASSAY OF LIPASE: CPT

## 2024-12-25 PROCEDURE — 2580000003 HC RX 258: Performed by: EMERGENCY MEDICINE

## 2024-12-25 PROCEDURE — 36415 COLL VENOUS BLD VENIPUNCTURE: CPT

## 2024-12-25 PROCEDURE — 99285 EMERGENCY DEPT VISIT HI MDM: CPT

## 2024-12-25 PROCEDURE — 2500000003 HC RX 250 WO HCPCS: Performed by: INTERNAL MEDICINE

## 2024-12-25 PROCEDURE — 6360000004 HC RX CONTRAST MEDICATION: Performed by: EMERGENCY MEDICINE

## 2024-12-25 PROCEDURE — 74022 RADEX COMPL AQT ABD SERIES: CPT

## 2024-12-25 PROCEDURE — 6370000000 HC RX 637 (ALT 250 FOR IP): Performed by: EMERGENCY MEDICINE

## 2024-12-25 PROCEDURE — 6360000002 HC RX W HCPCS: Performed by: EMERGENCY MEDICINE

## 2024-12-25 RX ORDER — ENOXAPARIN SODIUM 100 MG/ML
1 INJECTION SUBCUTANEOUS ONCE
Status: DISCONTINUED | OUTPATIENT
Start: 2024-12-25 | End: 2024-12-25

## 2024-12-25 RX ORDER — SODIUM CHLORIDE 9 MG/ML
INJECTION, SOLUTION INTRAVENOUS PRN
Status: DISCONTINUED | OUTPATIENT
Start: 2024-12-25 | End: 2024-12-26 | Stop reason: HOSPADM

## 2024-12-25 RX ORDER — OXCARBAZEPINE 150 MG/1
300 TABLET, FILM COATED ORAL 2 TIMES DAILY
Status: DISCONTINUED | OUTPATIENT
Start: 2024-12-25 | End: 2024-12-26 | Stop reason: HOSPADM

## 2024-12-25 RX ORDER — ONDANSETRON 2 MG/ML
4 INJECTION INTRAMUSCULAR; INTRAVENOUS EVERY 6 HOURS PRN
Status: DISCONTINUED | OUTPATIENT
Start: 2024-12-25 | End: 2024-12-26 | Stop reason: HOSPADM

## 2024-12-25 RX ORDER — INSULIN LISPRO 100 [IU]/ML
0-8 INJECTION, SOLUTION INTRAVENOUS; SUBCUTANEOUS
Status: DISCONTINUED | OUTPATIENT
Start: 2024-12-25 | End: 2024-12-26 | Stop reason: HOSPADM

## 2024-12-25 RX ORDER — SODIUM CHLORIDE 0.9 % (FLUSH) 0.9 %
10 SYRINGE (ML) INJECTION PRN
Status: DISCONTINUED | OUTPATIENT
Start: 2024-12-25 | End: 2024-12-26 | Stop reason: HOSPADM

## 2024-12-25 RX ORDER — ACETAMINOPHEN 325 MG/1
650 TABLET ORAL EVERY 6 HOURS PRN
Status: DISCONTINUED | OUTPATIENT
Start: 2024-12-25 | End: 2024-12-26 | Stop reason: HOSPADM

## 2024-12-25 RX ORDER — ENOXAPARIN SODIUM 100 MG/ML
1 INJECTION SUBCUTANEOUS ONCE
Status: COMPLETED | OUTPATIENT
Start: 2024-12-25 | End: 2024-12-25

## 2024-12-25 RX ORDER — DEXTROSE MONOHYDRATE 100 MG/ML
INJECTION, SOLUTION INTRAVENOUS CONTINUOUS PRN
Status: DISCONTINUED | OUTPATIENT
Start: 2024-12-25 | End: 2024-12-26 | Stop reason: HOSPADM

## 2024-12-25 RX ORDER — MORPHINE SULFATE 2 MG/ML
1 INJECTION, SOLUTION INTRAMUSCULAR; INTRAVENOUS
Status: DISCONTINUED | OUTPATIENT
Start: 2024-12-25 | End: 2024-12-26 | Stop reason: HOSPADM

## 2024-12-25 RX ORDER — POLYETHYLENE GLYCOL 3350 17 G/17G
17 POWDER, FOR SOLUTION ORAL DAILY PRN
Status: DISCONTINUED | OUTPATIENT
Start: 2024-12-25 | End: 2024-12-26 | Stop reason: HOSPADM

## 2024-12-25 RX ORDER — SODIUM CHLORIDE 0.9 % (FLUSH) 0.9 %
10 SYRINGE (ML) INJECTION EVERY 12 HOURS SCHEDULED
Status: DISCONTINUED | OUTPATIENT
Start: 2024-12-25 | End: 2024-12-26 | Stop reason: HOSPADM

## 2024-12-25 RX ORDER — HYDRALAZINE HYDROCHLORIDE 20 MG/ML
10 INJECTION INTRAMUSCULAR; INTRAVENOUS ONCE
Status: DISCONTINUED | OUTPATIENT
Start: 2024-12-25 | End: 2024-12-26 | Stop reason: HOSPADM

## 2024-12-25 RX ORDER — PROMETHAZINE HYDROCHLORIDE 12.5 MG/1
12.5 TABLET ORAL EVERY 6 HOURS PRN
Status: DISCONTINUED | OUTPATIENT
Start: 2024-12-25 | End: 2024-12-26 | Stop reason: HOSPADM

## 2024-12-25 RX ORDER — DIPHENHYDRAMINE HYDROCHLORIDE 50 MG/ML
25 INJECTION INTRAMUSCULAR; INTRAVENOUS ONCE
Status: COMPLETED | OUTPATIENT
Start: 2024-12-25 | End: 2024-12-25

## 2024-12-25 RX ORDER — ACETAMINOPHEN 650 MG/1
650 SUPPOSITORY RECTAL EVERY 6 HOURS PRN
Status: DISCONTINUED | OUTPATIENT
Start: 2024-12-25 | End: 2024-12-26 | Stop reason: HOSPADM

## 2024-12-25 RX ORDER — METOCLOPRAMIDE HYDROCHLORIDE 5 MG/ML
10 INJECTION INTRAMUSCULAR; INTRAVENOUS ONCE
Status: COMPLETED | OUTPATIENT
Start: 2024-12-25 | End: 2024-12-25

## 2024-12-25 RX ORDER — ATORVASTATIN CALCIUM 40 MG/1
40 TABLET, FILM COATED ORAL NIGHTLY
Status: DISCONTINUED | OUTPATIENT
Start: 2024-12-25 | End: 2024-12-26 | Stop reason: HOSPADM

## 2024-12-25 RX ORDER — 0.9 % SODIUM CHLORIDE 0.9 %
1000 INTRAVENOUS SOLUTION INTRAVENOUS ONCE
Status: COMPLETED | OUTPATIENT
Start: 2024-12-25 | End: 2024-12-25

## 2024-12-25 RX ORDER — PANTOPRAZOLE SODIUM 40 MG/1
40 TABLET, DELAYED RELEASE ORAL
Status: DISCONTINUED | OUTPATIENT
Start: 2024-12-26 | End: 2024-12-26 | Stop reason: HOSPADM

## 2024-12-25 RX ORDER — ENOXAPARIN SODIUM 100 MG/ML
1 INJECTION SUBCUTANEOUS 2 TIMES DAILY
Status: DISCONTINUED | OUTPATIENT
Start: 2024-12-26 | End: 2024-12-26

## 2024-12-25 RX ORDER — GLUCAGON 1 MG/ML
1 KIT INJECTION PRN
Status: DISCONTINUED | OUTPATIENT
Start: 2024-12-25 | End: 2024-12-26 | Stop reason: HOSPADM

## 2024-12-25 RX ORDER — OXYCODONE HYDROCHLORIDE 5 MG/1
5 TABLET ORAL EVERY 4 HOURS PRN
Status: DISCONTINUED | OUTPATIENT
Start: 2024-12-25 | End: 2024-12-26 | Stop reason: HOSPADM

## 2024-12-25 RX ORDER — HYDRALAZINE HYDROCHLORIDE 20 MG/ML
10 INJECTION INTRAMUSCULAR; INTRAVENOUS EVERY 4 HOURS PRN
Status: DISCONTINUED | OUTPATIENT
Start: 2024-12-25 | End: 2024-12-26 | Stop reason: HOSPADM

## 2024-12-25 RX ORDER — IOPAMIDOL 755 MG/ML
75 INJECTION, SOLUTION INTRAVASCULAR
Status: COMPLETED | OUTPATIENT
Start: 2024-12-25 | End: 2024-12-25

## 2024-12-25 RX ADMIN — OXCARBAZEPINE 300 MG: 150 TABLET, FILM COATED ORAL at 23:47

## 2024-12-25 RX ADMIN — IOPAMIDOL 75 ML: 755 INJECTION, SOLUTION INTRAVENOUS at 17:29

## 2024-12-25 RX ADMIN — METOCLOPRAMIDE 10 MG: 5 INJECTION, SOLUTION INTRAMUSCULAR; INTRAVENOUS at 16:28

## 2024-12-25 RX ADMIN — PROMETHAZINE HYDROCHLORIDE 12.5 MG: 12.5 TABLET ORAL at 23:47

## 2024-12-25 RX ADMIN — ALUMINUM HYDROXIDE, MAGNESIUM HYDROXIDE, AND SIMETHICONE: 1200; 120; 1200 SUSPENSION ORAL at 15:45

## 2024-12-25 RX ADMIN — SODIUM CHLORIDE 1000 ML: 9 INJECTION, SOLUTION INTRAVENOUS at 16:27

## 2024-12-25 RX ADMIN — ENOXAPARIN SODIUM 90 MG: 100 INJECTION SUBCUTANEOUS at 20:30

## 2024-12-25 RX ADMIN — SODIUM CHLORIDE, PRESERVATIVE FREE 10 ML: 5 INJECTION INTRAVENOUS at 23:47

## 2024-12-25 RX ADMIN — ATORVASTATIN CALCIUM 40 MG: 40 TABLET, FILM COATED ORAL at 23:47

## 2024-12-25 RX ADMIN — OXYCODONE 5 MG: 5 TABLET ORAL at 23:14

## 2024-12-25 RX ADMIN — DIPHENHYDRAMINE HYDROCHLORIDE 25 MG: 50 INJECTION INTRAMUSCULAR; INTRAVENOUS at 16:27

## 2024-12-25 ASSESSMENT — ENCOUNTER SYMPTOMS
EYE REDNESS: 0
DIARRHEA: 0
EYE PAIN: 0
CHEST TIGHTNESS: 0
CONSTIPATION: 0
EYE DISCHARGE: 0
CHOKING: 0
VOMITING: 0
BLOOD IN STOOL: 0
FACIAL SWELLING: 0
STRIDOR: 0
SINUS PRESSURE: 0
VOICE CHANGE: 0
BACK PAIN: 0
WHEEZING: 0
COUGH: 0
TROUBLE SWALLOWING: 0
SHORTNESS OF BREATH: 0
SORE THROAT: 0
ABDOMINAL PAIN: 1

## 2024-12-25 ASSESSMENT — PAIN DESCRIPTION - ONSET: ONSET: GRADUAL

## 2024-12-25 ASSESSMENT — PAIN SCALES - GENERAL
PAINLEVEL_OUTOF10: 10
PAINLEVEL_OUTOF10: 8

## 2024-12-25 ASSESSMENT — PAIN DESCRIPTION - ORIENTATION: ORIENTATION: ANTERIOR

## 2024-12-25 ASSESSMENT — PAIN DESCRIPTION - DESCRIPTORS: DESCRIPTORS: PRESSURE

## 2024-12-25 ASSESSMENT — PAIN DESCRIPTION - LOCATION
LOCATION: CHEST
LOCATION: CHEST

## 2024-12-25 ASSESSMENT — LIFESTYLE VARIABLES
HOW OFTEN DO YOU HAVE A DRINK CONTAINING ALCOHOL: NEVER
HOW MANY STANDARD DRINKS CONTAINING ALCOHOL DO YOU HAVE ON A TYPICAL DAY: PATIENT DOES NOT DRINK

## 2024-12-25 ASSESSMENT — PAIN DESCRIPTION - FREQUENCY: FREQUENCY: INTERMITTENT

## 2024-12-25 ASSESSMENT — PAIN - FUNCTIONAL ASSESSMENT: PAIN_FUNCTIONAL_ASSESSMENT: 0-10

## 2024-12-25 NOTE — ED PROVIDER NOTES
[facility] ED  eMERGENCY dEPARTMENT eNCOUnter      Pt Name: Rekha Russ  MRN: [id number]  Birthdate 1955  Date of evaluation: 12/25/2024  Provider: Jazzmine Stacy MD    CHIEF COMPLAINT       Chief Complaint   Patient presents with    Chest Pain         HISTORY OF PRESENT ILLNESS   (Location/Symptom, Timing/Onset,Context/Setting, Quality, Duration, Modifying Factors, Severity)  Note limiting factors.   Rekha Russ is a 69 y.o. female who presents to the emergency department patient was seen early morning in this emergency for GI symptoms/chest pain gaseous abdomen increasing belching burping heartburn patient has a history of coronary disease undergone successful angioplasty in the month of October as per information patient had full workup done earlier this morning and sent home as suspected GE reflux patient has no previous abdominal surgery denies any gallbladder disease but patient admits to be afraid to eat as she has increasing heartburn and burping frequently patient said that she is also constipated has no nausea vomiting denies any alcohol    HPI    NursingNotes were reviewed.    REVIEW OF SYSTEMS    (2-9 systems for level 4, 10 or more for level 5)     Review of Systems   Constitutional: Negative.  Negative for activity change and fever.   HENT:  Negative for congestion, drooling, facial swelling, mouth sores, nosebleeds, sinus pressure, sore throat, trouble swallowing and voice change.    Eyes:  Negative for pain, discharge, redness and visual disturbance.   Respiratory:  Negative for cough, choking, chest tightness, shortness of breath, wheezing and stridor.    Cardiovascular:  Positive for chest pain. Negative for palpitations and leg swelling.   Gastrointestinal:  Positive for abdominal pain. Negative for blood in stool, constipation, diarrhea and vomiting.   Endocrine: Negative for cold intolerance, polyphagia and polyuria.   Genitourinary:  Negative for dysuria,

## 2024-12-25 NOTE — DISCHARGE INSTRUCTIONS
Increase fiber in the diet try to have bland diet to cut down the acidity in the stomach continue your medication for the stomach you need a outpatient ultrasound for the gallbladder and if the problem continues then you will need a surgical doctor as well keep in touch with your primary care physician for further follow-up and workup

## 2024-12-26 ENCOUNTER — HOSPITAL ENCOUNTER (INPATIENT)
Age: 69
LOS: 2 days | Discharge: HOME | End: 2024-12-28
Attending: INTERNAL MEDICINE | Admitting: INTERNAL MEDICINE
Payer: MEDICARE

## 2024-12-26 VITALS
SYSTOLIC BLOOD PRESSURE: 149 MMHG | DIASTOLIC BLOOD PRESSURE: 65 MMHG | TEMPERATURE: 99.5 F | RESPIRATION RATE: 18 BRPM | HEIGHT: 65 IN | WEIGHT: 195 LBS | HEART RATE: 71 BPM | BODY MASS INDEX: 32.49 KG/M2 | OXYGEN SATURATION: 98 %

## 2024-12-26 DIAGNOSIS — R07.9 CHEST PAIN, UNSPECIFIED TYPE: ICD-10-CM

## 2024-12-26 PROBLEM — I21.4 NSTEMI (NON-ST ELEVATED MYOCARDIAL INFARCTION) (HCC): Status: ACTIVE | Noted: 2024-12-26

## 2024-12-26 LAB
ALBUMIN SERPL-MCNC: 4.5 G/DL (ref 3.5–4.6)
ALP SERPL-CCNC: 108 U/L (ref 40–130)
ALT SERPL-CCNC: 30 U/L (ref 0–33)
ANION GAP SERPL CALCULATED.3IONS-SCNC: 14 MEQ/L (ref 9–15)
ANTI-XA UNFRAC HEPARIN: 0.15 IU/ML
ANTI-XA UNFRAC HEPARIN: 0.31 IU/ML
APTT PPP: 26 SEC (ref 24.4–36.8)
AST SERPL-CCNC: 132 U/L (ref 0–35)
BASOPHILS # BLD: 0 K/UL (ref 0–0.1)
BASOPHILS NFR BLD: 0.4 % (ref 0.1–1.2)
BILIRUB SERPL-MCNC: 0.4 MG/DL (ref 0.2–0.7)
BUN SERPL-MCNC: 15 MG/DL (ref 8–23)
CALCIUM SERPL-MCNC: 10.7 MG/DL (ref 8.5–9.9)
CHLORIDE SERPL-SCNC: 102 MEQ/L (ref 95–107)
CO2 SERPL-SCNC: 25 MEQ/L (ref 20–31)
CREAT SERPL-MCNC: 0.57 MG/DL (ref 0.5–0.9)
D DIMER PPP FEU-MCNC: 0.69 MG/L FEU (ref 0–0.5)
EKG ATRIAL RATE: 71 BPM
EKG ATRIAL RATE: 79 BPM
EKG P AXIS: -4 DEGREES
EKG P AXIS: 40 DEGREES
EKG P-R INTERVAL: 192 MS
EKG P-R INTERVAL: 198 MS
EKG Q-T INTERVAL: 406 MS
EKG Q-T INTERVAL: 412 MS
EKG QRS DURATION: 90 MS
EKG QRS DURATION: 96 MS
EKG QTC CALCULATION (BAZETT): 441 MS
EKG QTC CALCULATION (BAZETT): 472 MS
EKG R AXIS: -9 DEGREES
EKG R AXIS: 26 DEGREES
EKG T AXIS: 34 DEGREES
EKG T AXIS: 46 DEGREES
EKG VENTRICULAR RATE: 71 BPM
EKG VENTRICULAR RATE: 79 BPM
EOSINOPHIL # BLD: 0 K/UL (ref 0–0.4)
EOSINOPHIL NFR BLD: 0 % (ref 0.7–5.8)
ERYTHROCYTE [DISTWIDTH] IN BLOOD BY AUTOMATED COUNT: 12.5 % (ref 11.7–14.4)
ERYTHROCYTE [DISTWIDTH] IN BLOOD BY AUTOMATED COUNT: 12.6 % (ref 11.7–14.4)
ESTIMATED AVERAGE GLUCOSE: 120 MG/DL
GLOBULIN SER CALC-MCNC: 3.1 G/DL (ref 2.3–3.5)
GLUCOSE BLD-MCNC: 135 MG/DL (ref 70–99)
GLUCOSE BLD-MCNC: 141 MG/DL (ref 70–99)
GLUCOSE SERPL-MCNC: 152 MG/DL (ref 70–99)
HBA1C MFR BLD: 5.8 % (ref 4–6)
HCT VFR BLD AUTO: 37 % (ref 37–47)
HCT VFR BLD AUTO: 38.1 % (ref 37–47)
HGB BLD-MCNC: 12.6 G/DL (ref 11.2–15.7)
HGB BLD-MCNC: 13 G/DL (ref 11.2–15.7)
IMM GRANULOCYTES # BLD: 0 K/UL
IMM GRANULOCYTES NFR BLD: 0.4 %
INR PPP: 1
LYMPHOCYTES # BLD: 1.6 K/UL (ref 1.2–3.7)
LYMPHOCYTES NFR BLD: 15.6 %
MAGNESIUM SERPL-MCNC: 2.2 MG/DL (ref 1.7–2.4)
MCH RBC QN AUTO: 31 PG (ref 25.6–32.2)
MCH RBC QN AUTO: 31.1 PG (ref 25.6–32.2)
MCHC RBC AUTO-ENTMCNC: 34.1 % (ref 32.2–35.5)
MCHC RBC AUTO-ENTMCNC: 34.1 % (ref 32.2–35.5)
MCV RBC AUTO: 90.9 FL (ref 79.4–94.8)
MCV RBC AUTO: 91.4 FL (ref 79.4–94.8)
MONOCYTES # BLD: 0.7 K/UL (ref 0.2–0.9)
MONOCYTES NFR BLD: 6.2 % (ref 4.7–12.5)
NEUTROPHILS # BLD: 8.1 K/UL (ref 1.6–6.1)
NEUTS SEG NFR BLD: 77.4 % (ref 34–71.1)
PERFORMED ON: ABNORMAL
PERFORMED ON: ABNORMAL
PLATELET # BLD AUTO: 203 K/UL (ref 182–369)
PLATELET # BLD AUTO: 232 K/UL (ref 182–369)
POTASSIUM SERPL-SCNC: 3.5 MEQ/L (ref 3.4–4.9)
PROT SERPL-MCNC: 7.6 G/DL (ref 6.3–8)
PROTHROMBIN TIME: 13.2 SEC (ref 12.3–14.9)
RBC # BLD AUTO: 4.05 M/UL (ref 3.93–5.22)
RBC # BLD AUTO: 4.19 M/UL (ref 3.93–5.22)
SODIUM SERPL-SCNC: 141 MEQ/L (ref 135–144)
TROPONIN, HIGH SENSITIVITY: 2059 NG/L (ref 0–19)
WBC # BLD AUTO: 10.5 K/UL (ref 4–10)
WBC # BLD AUTO: 11.4 K/UL (ref 4–10)

## 2024-12-26 PROCEDURE — C1769 GUIDE WIRE: HCPCS | Performed by: INTERNAL MEDICINE

## 2024-12-26 PROCEDURE — 6370000000 HC RX 637 (ALT 250 FOR IP): Performed by: INTERNAL MEDICINE

## 2024-12-26 PROCEDURE — 93010 ELECTROCARDIOGRAM REPORT: CPT | Performed by: INTERNAL MEDICINE

## 2024-12-26 PROCEDURE — 6360000002 HC RX W HCPCS: Performed by: INTERNAL MEDICINE

## 2024-12-26 PROCEDURE — B2151ZZ FLUOROSCOPY OF LEFT HEART USING LOW OSMOLAR CONTRAST: ICD-10-PCS | Performed by: INTERNAL MEDICINE

## 2024-12-26 PROCEDURE — B2111ZZ FLUOROSCOPY OF MULTIPLE CORONARY ARTERIES USING LOW OSMOLAR CONTRAST: ICD-10-PCS | Performed by: INTERNAL MEDICINE

## 2024-12-26 PROCEDURE — 80053 COMPREHEN METABOLIC PANEL: CPT

## 2024-12-26 PROCEDURE — 36415 COLL VENOUS BLD VENIPUNCTURE: CPT

## 2024-12-26 PROCEDURE — 93458 L HRT ARTERY/VENTRICLE ANGIO: CPT | Performed by: INTERNAL MEDICINE

## 2024-12-26 PROCEDURE — G0378 HOSPITAL OBSERVATION PER HR: HCPCS

## 2024-12-26 PROCEDURE — 2580000003 HC RX 258: Performed by: INTERNAL MEDICINE

## 2024-12-26 PROCEDURE — 85379 FIBRIN DEGRADATION QUANT: CPT

## 2024-12-26 PROCEDURE — 83735 ASSAY OF MAGNESIUM: CPT

## 2024-12-26 PROCEDURE — 96375 TX/PRO/DX INJ NEW DRUG ADDON: CPT

## 2024-12-26 PROCEDURE — 7100000010 HC PHASE II RECOVERY - FIRST 15 MIN: Performed by: INTERNAL MEDICINE

## 2024-12-26 PROCEDURE — 99152 MOD SED SAME PHYS/QHP 5/>YRS: CPT | Performed by: INTERNAL MEDICINE

## 2024-12-26 PROCEDURE — 85730 THROMBOPLASTIN TIME PARTIAL: CPT

## 2024-12-26 PROCEDURE — 2709999900 HC NON-CHARGEABLE SUPPLY: Performed by: INTERNAL MEDICINE

## 2024-12-26 PROCEDURE — 85610 PROTHROMBIN TIME: CPT

## 2024-12-26 PROCEDURE — 027034Z DILATION OF CORONARY ARTERY, ONE ARTERY WITH DRUG-ELUTING INTRALUMINAL DEVICE, PERCUTANEOUS APPROACH: ICD-10-PCS | Performed by: INTERNAL MEDICINE

## 2024-12-26 PROCEDURE — 85520 HEPARIN ASSAY: CPT

## 2024-12-26 PROCEDURE — 92928 PRQ TCAT PLMT NTRAC ST 1 LES: CPT | Performed by: INTERNAL MEDICINE

## 2024-12-26 PROCEDURE — 4A023N7 MEASUREMENT OF CARDIAC SAMPLING AND PRESSURE, LEFT HEART, PERCUTANEOUS APPROACH: ICD-10-PCS | Performed by: INTERNAL MEDICINE

## 2024-12-26 PROCEDURE — 85025 COMPLETE CBC W/AUTO DIFF WBC: CPT

## 2024-12-26 PROCEDURE — 85347 COAGULATION TIME ACTIVATED: CPT

## 2024-12-26 PROCEDURE — 92921 HC PRQ CARDIAC ANGIO ADDL ART: CPT | Performed by: INTERNAL MEDICINE

## 2024-12-26 PROCEDURE — 99153 MOD SED SAME PHYS/QHP EA: CPT | Performed by: INTERNAL MEDICINE

## 2024-12-26 PROCEDURE — 7100000011 HC PHASE II RECOVERY - ADDTL 15 MIN: Performed by: INTERNAL MEDICINE

## 2024-12-26 PROCEDURE — C1725 CATH, TRANSLUMIN NON-LASER: HCPCS | Performed by: INTERNAL MEDICINE

## 2024-12-26 PROCEDURE — C1760 CLOSURE DEV, VASC: HCPCS | Performed by: INTERNAL MEDICINE

## 2024-12-26 PROCEDURE — 2060000000 HC ICU INTERMEDIATE R&B

## 2024-12-26 PROCEDURE — 83036 HEMOGLOBIN GLYCOSYLATED A1C: CPT

## 2024-12-26 PROCEDURE — 84484 ASSAY OF TROPONIN QUANT: CPT

## 2024-12-26 PROCEDURE — C1874 STENT, COATED/COV W/DEL SYS: HCPCS | Performed by: INTERNAL MEDICINE

## 2024-12-26 PROCEDURE — 93005 ELECTROCARDIOGRAM TRACING: CPT

## 2024-12-26 PROCEDURE — C1894 INTRO/SHEATH, NON-LASER: HCPCS | Performed by: INTERNAL MEDICINE

## 2024-12-26 PROCEDURE — 6360000004 HC RX CONTRAST MEDICATION: Performed by: INTERNAL MEDICINE

## 2024-12-26 PROCEDURE — C1887 CATHETER, GUIDING: HCPCS | Performed by: INTERNAL MEDICINE

## 2024-12-26 PROCEDURE — 93005 ELECTROCARDIOGRAM TRACING: CPT | Performed by: INTERNAL MEDICINE

## 2024-12-26 PROCEDURE — 99223 1ST HOSP IP/OBS HIGH 75: CPT | Performed by: INTERNAL MEDICINE

## 2024-12-26 PROCEDURE — 85027 COMPLETE CBC AUTOMATED: CPT

## 2024-12-26 DEVICE — STENT ONYXNG40012UX ONYX 4.00X12RX
Type: IMPLANTABLE DEVICE | Status: FUNCTIONAL
Brand: ONYX FRONTIER™

## 2024-12-26 RX ORDER — SODIUM CHLORIDE 0.9 % (FLUSH) 0.9 %
5-40 SYRINGE (ML) INJECTION PRN
Status: DISCONTINUED | OUTPATIENT
Start: 2024-12-26 | End: 2024-12-26 | Stop reason: HOSPADM

## 2024-12-26 RX ORDER — METOPROLOL SUCCINATE 50 MG/1
50 TABLET, EXTENDED RELEASE ORAL DAILY
Status: DISCONTINUED | OUTPATIENT
Start: 2024-12-26 | End: 2024-12-28 | Stop reason: HOSPADM

## 2024-12-26 RX ORDER — PANTOPRAZOLE SODIUM 40 MG/1
40 TABLET, DELAYED RELEASE ORAL DAILY
Status: DISCONTINUED | OUTPATIENT
Start: 2024-12-26 | End: 2024-12-28 | Stop reason: HOSPADM

## 2024-12-26 RX ORDER — NICARDIPINE HYDROCHLORIDE 2.5 MG/ML
INJECTION INTRAVENOUS PRN
Status: DISCONTINUED | OUTPATIENT
Start: 2024-12-26 | End: 2024-12-26 | Stop reason: HOSPADM

## 2024-12-26 RX ORDER — OXCARBAZEPINE 150 MG/1
300 TABLET, FILM COATED ORAL 2 TIMES DAILY
Status: CANCELLED | OUTPATIENT
Start: 2024-12-26

## 2024-12-26 RX ORDER — SODIUM CHLORIDE 9 MG/ML
INJECTION, SOLUTION INTRAVENOUS PRN
Status: DISCONTINUED | OUTPATIENT
Start: 2024-12-26 | End: 2024-12-26 | Stop reason: HOSPADM

## 2024-12-26 RX ORDER — SODIUM CHLORIDE 9 MG/ML
INJECTION, SOLUTION INTRAVENOUS CONTINUOUS
Status: DISPENSED | OUTPATIENT
Start: 2024-12-26 | End: 2024-12-26

## 2024-12-26 RX ORDER — ACETAMINOPHEN 325 MG/1
650 TABLET ORAL EVERY 6 HOURS PRN
Status: DISCONTINUED | OUTPATIENT
Start: 2024-12-26 | End: 2024-12-28 | Stop reason: HOSPADM

## 2024-12-26 RX ORDER — HEPARIN SODIUM 10000 [USP'U]/100ML
5-30 INJECTION, SOLUTION INTRAVENOUS CONTINUOUS
Status: DISCONTINUED | OUTPATIENT
Start: 2024-12-26 | End: 2024-12-26 | Stop reason: HOSPADM

## 2024-12-26 RX ORDER — LOSARTAN POTASSIUM 25 MG/1
25 TABLET ORAL DAILY
Status: DISCONTINUED | OUTPATIENT
Start: 2024-12-26 | End: 2024-12-26 | Stop reason: HOSPADM

## 2024-12-26 RX ORDER — MIDAZOLAM HYDROCHLORIDE 1 MG/ML
INJECTION, SOLUTION INTRAMUSCULAR; INTRAVENOUS PRN
Status: DISCONTINUED | OUTPATIENT
Start: 2024-12-26 | End: 2024-12-26 | Stop reason: HOSPADM

## 2024-12-26 RX ORDER — CLOPIDOGREL BISULFATE 75 MG/1
75 TABLET ORAL DAILY
Status: DISCONTINUED | OUTPATIENT
Start: 2024-12-26 | End: 2024-12-26 | Stop reason: HOSPADM

## 2024-12-26 RX ORDER — HEPARIN SODIUM 10000 [USP'U]/100ML
5-30 INJECTION, SOLUTION INTRAVENOUS CONTINUOUS
Status: CANCELLED | OUTPATIENT
Start: 2024-12-26

## 2024-12-26 RX ORDER — LOSARTAN POTASSIUM 25 MG/1
25 TABLET ORAL DAILY
Status: CANCELLED | OUTPATIENT
Start: 2024-12-26

## 2024-12-26 RX ORDER — ONDANSETRON 2 MG/ML
4 INJECTION INTRAMUSCULAR; INTRAVENOUS EVERY 6 HOURS PRN
Status: CANCELLED | OUTPATIENT
Start: 2024-12-26

## 2024-12-26 RX ORDER — GLUCAGON 1 MG/ML
1 KIT INJECTION PRN
Status: CANCELLED | OUTPATIENT
Start: 2024-12-26

## 2024-12-26 RX ORDER — OXYCODONE HYDROCHLORIDE 5 MG/1
5 TABLET ORAL EVERY 4 HOURS PRN
Status: DISCONTINUED | OUTPATIENT
Start: 2024-12-26 | End: 2024-12-28 | Stop reason: HOSPADM

## 2024-12-26 RX ORDER — ONDANSETRON 2 MG/ML
4 INJECTION INTRAMUSCULAR; INTRAVENOUS EVERY 6 HOURS PRN
Status: DISCONTINUED | OUTPATIENT
Start: 2024-12-26 | End: 2024-12-28 | Stop reason: HOSPADM

## 2024-12-26 RX ORDER — METOPROLOL SUCCINATE 50 MG/1
50 TABLET, EXTENDED RELEASE ORAL DAILY
Status: DISCONTINUED | OUTPATIENT
Start: 2024-12-26 | End: 2024-12-26 | Stop reason: HOSPADM

## 2024-12-26 RX ORDER — INSULIN LISPRO 100 [IU]/ML
0-8 INJECTION, SOLUTION INTRAVENOUS; SUBCUTANEOUS
Status: CANCELLED | OUTPATIENT
Start: 2024-12-26

## 2024-12-26 RX ORDER — POLYETHYLENE GLYCOL 3350 17 G/17G
17 POWDER, FOR SOLUTION ORAL DAILY PRN
Status: DISCONTINUED | OUTPATIENT
Start: 2024-12-26 | End: 2024-12-28 | Stop reason: HOSPADM

## 2024-12-26 RX ORDER — METOPROLOL SUCCINATE 50 MG/1
50 TABLET, EXTENDED RELEASE ORAL DAILY
Status: CANCELLED | OUTPATIENT
Start: 2024-12-26

## 2024-12-26 RX ORDER — HEPARIN SODIUM 1000 [USP'U]/ML
4000 INJECTION, SOLUTION INTRAVENOUS; SUBCUTANEOUS PRN
Status: DISCONTINUED | OUTPATIENT
Start: 2024-12-26 | End: 2024-12-26 | Stop reason: HOSPADM

## 2024-12-26 RX ORDER — HEPARIN SODIUM 1000 [USP'U]/ML
4000 INJECTION, SOLUTION INTRAVENOUS; SUBCUTANEOUS ONCE
Status: COMPLETED | OUTPATIENT
Start: 2024-12-26 | End: 2024-12-26

## 2024-12-26 RX ORDER — SODIUM CHLORIDE 0.9 % (FLUSH) 0.9 %
10 SYRINGE (ML) INJECTION EVERY 12 HOURS SCHEDULED
Status: CANCELLED | OUTPATIENT
Start: 2024-12-26

## 2024-12-26 RX ORDER — ASPIRIN 81 MG/1
TABLET, CHEWABLE ORAL PRN
Status: DISCONTINUED | OUTPATIENT
Start: 2024-12-26 | End: 2024-12-26 | Stop reason: HOSPADM

## 2024-12-26 RX ORDER — DEXTROSE MONOHYDRATE 100 MG/ML
INJECTION, SOLUTION INTRAVENOUS CONTINUOUS PRN
Status: CANCELLED | OUTPATIENT
Start: 2024-12-26

## 2024-12-26 RX ORDER — SODIUM CHLORIDE 0.9 % (FLUSH) 0.9 %
5-40 SYRINGE (ML) INJECTION EVERY 12 HOURS SCHEDULED
Status: DISCONTINUED | OUTPATIENT
Start: 2024-12-26 | End: 2024-12-26 | Stop reason: HOSPADM

## 2024-12-26 RX ORDER — HEPARIN SODIUM 1000 [USP'U]/ML
2000 INJECTION, SOLUTION INTRAVENOUS; SUBCUTANEOUS PRN
Status: DISCONTINUED | OUTPATIENT
Start: 2024-12-26 | End: 2024-12-27

## 2024-12-26 RX ORDER — HYDRALAZINE HYDROCHLORIDE 20 MG/ML
10 INJECTION INTRAMUSCULAR; INTRAVENOUS EVERY 4 HOURS PRN
Status: DISCONTINUED | OUTPATIENT
Start: 2024-12-26 | End: 2024-12-28 | Stop reason: HOSPADM

## 2024-12-26 RX ORDER — DEXTROSE MONOHYDRATE 100 MG/ML
INJECTION, SOLUTION INTRAVENOUS CONTINUOUS PRN
Status: DISCONTINUED | OUTPATIENT
Start: 2024-12-26 | End: 2024-12-28 | Stop reason: HOSPADM

## 2024-12-26 RX ORDER — POTASSIUM CHLORIDE 1500 MG/1
20 TABLET, EXTENDED RELEASE ORAL ONCE
Status: COMPLETED | OUTPATIENT
Start: 2024-12-26 | End: 2024-12-26

## 2024-12-26 RX ORDER — GLUCAGON 1 MG/ML
1 KIT INJECTION PRN
Status: DISCONTINUED | OUTPATIENT
Start: 2024-12-26 | End: 2024-12-28 | Stop reason: HOSPADM

## 2024-12-26 RX ORDER — HEPARIN SODIUM 1000 [USP'U]/ML
4000 INJECTION, SOLUTION INTRAVENOUS; SUBCUTANEOUS PRN
Status: CANCELLED | OUTPATIENT
Start: 2024-12-26

## 2024-12-26 RX ORDER — IOPAMIDOL 612 MG/ML
INJECTION, SOLUTION INTRAVASCULAR PRN
Status: DISCONTINUED | OUTPATIENT
Start: 2024-12-26 | End: 2024-12-26 | Stop reason: HOSPADM

## 2024-12-26 RX ORDER — HYDRALAZINE HYDROCHLORIDE 20 MG/ML
10 INJECTION INTRAMUSCULAR; INTRAVENOUS EVERY 4 HOURS PRN
Status: CANCELLED | OUTPATIENT
Start: 2024-12-26

## 2024-12-26 RX ORDER — OXCARBAZEPINE 300 MG/1
300 TABLET, FILM COATED ORAL 2 TIMES DAILY
Status: DISCONTINUED | OUTPATIENT
Start: 2024-12-26 | End: 2024-12-28 | Stop reason: HOSPADM

## 2024-12-26 RX ORDER — HEPARIN SODIUM 1000 [USP'U]/ML
2000 INJECTION, SOLUTION INTRAVENOUS; SUBCUTANEOUS PRN
Status: CANCELLED | OUTPATIENT
Start: 2024-12-26

## 2024-12-26 RX ORDER — CLOPIDOGREL BISULFATE 75 MG/1
TABLET ORAL PRN
Status: DISCONTINUED | OUTPATIENT
Start: 2024-12-26 | End: 2024-12-26 | Stop reason: HOSPADM

## 2024-12-26 RX ORDER — LOSARTAN POTASSIUM 25 MG/1
25 TABLET ORAL DAILY
Status: DISCONTINUED | OUTPATIENT
Start: 2024-12-26 | End: 2024-12-28 | Stop reason: HOSPADM

## 2024-12-26 RX ORDER — HEPARIN SODIUM 1000 [USP'U]/ML
4000 INJECTION, SOLUTION INTRAVENOUS; SUBCUTANEOUS PRN
Status: DISCONTINUED | OUTPATIENT
Start: 2024-12-26 | End: 2024-12-27

## 2024-12-26 RX ORDER — ACETAMINOPHEN 325 MG/1
650 TABLET ORAL EVERY 6 HOURS PRN
Status: CANCELLED | OUTPATIENT
Start: 2024-12-26

## 2024-12-26 RX ORDER — HEPARIN SODIUM 1000 [USP'U]/ML
INJECTION, SOLUTION INTRAVENOUS; SUBCUTANEOUS PRN
Status: DISCONTINUED | OUTPATIENT
Start: 2024-12-26 | End: 2024-12-26 | Stop reason: HOSPADM

## 2024-12-26 RX ORDER — OXYCODONE HYDROCHLORIDE 5 MG/1
5 TABLET ORAL EVERY 4 HOURS PRN
Status: CANCELLED | OUTPATIENT
Start: 2024-12-26

## 2024-12-26 RX ORDER — PANTOPRAZOLE SODIUM 40 MG/1
40 TABLET, DELAYED RELEASE ORAL
Status: CANCELLED | OUTPATIENT
Start: 2024-12-27

## 2024-12-26 RX ORDER — PROMETHAZINE HYDROCHLORIDE 12.5 MG/1
12.5 TABLET ORAL EVERY 6 HOURS PRN
Status: CANCELLED | OUTPATIENT
Start: 2024-12-26

## 2024-12-26 RX ORDER — CLOPIDOGREL BISULFATE 75 MG/1
75 TABLET ORAL DAILY
Status: DISCONTINUED | OUTPATIENT
Start: 2024-12-26 | End: 2024-12-26 | Stop reason: SDUPTHER

## 2024-12-26 RX ORDER — SODIUM CHLORIDE 0.9 % (FLUSH) 0.9 %
10 SYRINGE (ML) INJECTION PRN
Status: CANCELLED | OUTPATIENT
Start: 2024-12-26

## 2024-12-26 RX ORDER — ATORVASTATIN CALCIUM 40 MG/1
40 TABLET, FILM COATED ORAL NIGHTLY
Status: DISCONTINUED | OUTPATIENT
Start: 2024-12-26 | End: 2024-12-28 | Stop reason: HOSPADM

## 2024-12-26 RX ORDER — ACETAMINOPHEN 325 MG/1
650 TABLET ORAL EVERY 4 HOURS PRN
Status: DISCONTINUED | OUTPATIENT
Start: 2024-12-26 | End: 2024-12-26 | Stop reason: ALTCHOICE

## 2024-12-26 RX ORDER — HEPARIN SODIUM 1000 [USP'U]/ML
2000 INJECTION, SOLUTION INTRAVENOUS; SUBCUTANEOUS PRN
Status: DISCONTINUED | OUTPATIENT
Start: 2024-12-26 | End: 2024-12-26 | Stop reason: HOSPADM

## 2024-12-26 RX ORDER — ACETAMINOPHEN 650 MG/1
650 SUPPOSITORY RECTAL EVERY 6 HOURS PRN
Status: CANCELLED | OUTPATIENT
Start: 2024-12-26

## 2024-12-26 RX ORDER — INSULIN LISPRO 100 [IU]/ML
0-8 INJECTION, SOLUTION INTRAVENOUS; SUBCUTANEOUS
Status: DISCONTINUED | OUTPATIENT
Start: 2024-12-26 | End: 2024-12-28 | Stop reason: HOSPADM

## 2024-12-26 RX ORDER — MORPHINE SULFATE 2 MG/ML
1 INJECTION, SOLUTION INTRAMUSCULAR; INTRAVENOUS
Status: DISCONTINUED | OUTPATIENT
Start: 2024-12-26 | End: 2024-12-28 | Stop reason: HOSPADM

## 2024-12-26 RX ORDER — SODIUM CHLORIDE 9 MG/ML
INJECTION, SOLUTION INTRAVENOUS PRN
Status: CANCELLED | OUTPATIENT
Start: 2024-12-26

## 2024-12-26 RX ORDER — SODIUM CHLORIDE 0.9 % (FLUSH) 0.9 %
10 SYRINGE (ML) INJECTION EVERY 12 HOURS SCHEDULED
Status: DISCONTINUED | OUTPATIENT
Start: 2024-12-26 | End: 2024-12-28 | Stop reason: HOSPADM

## 2024-12-26 RX ORDER — SODIUM CHLORIDE 0.9 % (FLUSH) 0.9 %
10 SYRINGE (ML) INJECTION PRN
Status: DISCONTINUED | OUTPATIENT
Start: 2024-12-26 | End: 2024-12-28 | Stop reason: HOSPADM

## 2024-12-26 RX ORDER — BISOPROLOL FUMARATE AND HYDROCHLOROTHIAZIDE 5; 6.25 MG/1; MG/1
1 TABLET ORAL DAILY
Status: DISCONTINUED | OUTPATIENT
Start: 2024-12-26 | End: 2024-12-26 | Stop reason: CLARIF

## 2024-12-26 RX ORDER — CLOPIDOGREL BISULFATE 75 MG/1
75 TABLET ORAL DAILY
Status: CANCELLED | OUTPATIENT
Start: 2024-12-26

## 2024-12-26 RX ORDER — SODIUM CHLORIDE 9 MG/ML
INJECTION, SOLUTION INTRAVENOUS CONTINUOUS PRN
Status: COMPLETED | OUTPATIENT
Start: 2024-12-26 | End: 2024-12-26

## 2024-12-26 RX ORDER — FENTANYL CITRATE 50 UG/ML
INJECTION, SOLUTION INTRAMUSCULAR; INTRAVENOUS PRN
Status: DISCONTINUED | OUTPATIENT
Start: 2024-12-26 | End: 2024-12-26 | Stop reason: HOSPADM

## 2024-12-26 RX ORDER — POTASSIUM CHLORIDE 1500 MG/1
20 TABLET, EXTENDED RELEASE ORAL ONCE
Status: CANCELLED | OUTPATIENT
Start: 2024-12-26

## 2024-12-26 RX ORDER — MIDAZOLAM HYDROCHLORIDE 2 MG/2ML
2 INJECTION, SOLUTION INTRAMUSCULAR; INTRAVENOUS
Status: ACTIVE | OUTPATIENT
Start: 2024-12-26 | End: 2024-12-27

## 2024-12-26 RX ORDER — ASPIRIN 81 MG/1
81 TABLET ORAL DAILY
Status: DISCONTINUED | OUTPATIENT
Start: 2024-12-27 | End: 2024-12-28 | Stop reason: HOSPADM

## 2024-12-26 RX ORDER — CLOPIDOGREL BISULFATE 75 MG/1
75 TABLET ORAL DAILY
Status: DISCONTINUED | OUTPATIENT
Start: 2024-12-26 | End: 2024-12-28 | Stop reason: HOSPADM

## 2024-12-26 RX ORDER — PANTOPRAZOLE SODIUM 40 MG/1
40 TABLET, DELAYED RELEASE ORAL
Status: DISCONTINUED | OUTPATIENT
Start: 2024-12-27 | End: 2024-12-26 | Stop reason: SDUPTHER

## 2024-12-26 RX ORDER — HEPARIN SODIUM 10000 [USP'U]/100ML
5-30 INJECTION, SOLUTION INTRAVENOUS CONTINUOUS
Status: DISCONTINUED | OUTPATIENT
Start: 2024-12-26 | End: 2024-12-27

## 2024-12-26 RX ORDER — ATORVASTATIN CALCIUM 40 MG/1
40 TABLET, FILM COATED ORAL NIGHTLY
Status: CANCELLED | OUTPATIENT
Start: 2024-12-26

## 2024-12-26 RX ORDER — POLYETHYLENE GLYCOL 3350 17 G/17G
17 POWDER, FOR SOLUTION ORAL DAILY PRN
Status: CANCELLED | OUTPATIENT
Start: 2024-12-26

## 2024-12-26 RX ORDER — NITROGLYCERIN 20 MG/100ML
INJECTION INTRAVENOUS CONTINUOUS PRN
Status: COMPLETED | OUTPATIENT
Start: 2024-12-26 | End: 2024-12-26

## 2024-12-26 RX ORDER — OXCARBAZEPINE 300 MG/1
300 TABLET, FILM COATED ORAL 2 TIMES DAILY
Status: DISCONTINUED | OUTPATIENT
Start: 2024-12-26 | End: 2024-12-26 | Stop reason: SDUPTHER

## 2024-12-26 RX ORDER — SODIUM CHLORIDE 9 MG/ML
INJECTION, SOLUTION INTRAVENOUS PRN
Status: DISCONTINUED | OUTPATIENT
Start: 2024-12-26 | End: 2024-12-28 | Stop reason: HOSPADM

## 2024-12-26 RX ORDER — POTASSIUM CHLORIDE 1500 MG/1
20 TABLET, EXTENDED RELEASE ORAL ONCE
Status: DISCONTINUED | OUTPATIENT
Start: 2024-12-26 | End: 2024-12-26 | Stop reason: HOSPADM

## 2024-12-26 RX ORDER — ACETAMINOPHEN 650 MG/1
650 SUPPOSITORY RECTAL EVERY 6 HOURS PRN
Status: DISCONTINUED | OUTPATIENT
Start: 2024-12-26 | End: 2024-12-28 | Stop reason: HOSPADM

## 2024-12-26 RX ORDER — ATORVASTATIN CALCIUM 40 MG/1
40 TABLET, FILM COATED ORAL NIGHTLY
Status: DISCONTINUED | OUTPATIENT
Start: 2024-12-26 | End: 2024-12-26 | Stop reason: SDUPTHER

## 2024-12-26 RX ORDER — PROMETHAZINE HYDROCHLORIDE 12.5 MG/1
12.5 TABLET ORAL EVERY 6 HOURS PRN
Status: DISCONTINUED | OUTPATIENT
Start: 2024-12-26 | End: 2024-12-28 | Stop reason: HOSPADM

## 2024-12-26 RX ORDER — MORPHINE SULFATE 2 MG/ML
1 INJECTION, SOLUTION INTRAMUSCULAR; INTRAVENOUS
Status: CANCELLED | OUTPATIENT
Start: 2024-12-26

## 2024-12-26 RX ADMIN — PANTOPRAZOLE SODIUM 40 MG: 40 TABLET, DELAYED RELEASE ORAL at 17:28

## 2024-12-26 RX ADMIN — HEPARIN SODIUM 11 UNITS/KG/HR: 10000 INJECTION, SOLUTION INTRAVENOUS at 18:01

## 2024-12-26 RX ADMIN — METOPROLOL SUCCINATE 50 MG: 50 TABLET, EXTENDED RELEASE ORAL at 17:38

## 2024-12-26 RX ADMIN — LOSARTAN POTASSIUM 25 MG: 25 TABLET, FILM COATED ORAL at 17:28

## 2024-12-26 RX ADMIN — CLOPIDOGREL BISULFATE 75 MG: 75 TABLET ORAL at 17:28

## 2024-12-26 RX ADMIN — OXCARBAZEPINE 300 MG: 300 TABLET, FILM COATED ORAL at 21:32

## 2024-12-26 RX ADMIN — POTASSIUM CHLORIDE 20 MEQ: 1500 TABLET, EXTENDED RELEASE ORAL at 17:28

## 2024-12-26 RX ADMIN — SODIUM CHLORIDE: 9 INJECTION, SOLUTION INTRAVENOUS at 12:18

## 2024-12-26 RX ADMIN — HEPARIN SODIUM 4000 UNITS: 1000 INJECTION INTRAVENOUS; SUBCUTANEOUS at 07:52

## 2024-12-26 RX ADMIN — PANTOPRAZOLE SODIUM 40 MG: 40 TABLET, DELAYED RELEASE ORAL at 06:18

## 2024-12-26 RX ADMIN — HEPARIN SODIUM 11 UNITS/KG/HR: 10000 INJECTION, SOLUTION INTRAVENOUS at 08:05

## 2024-12-26 RX ADMIN — NITROGLYCERIN 0.5 INCH: 20 OINTMENT TOPICAL at 18:05

## 2024-12-26 RX ADMIN — ATORVASTATIN CALCIUM 40 MG: 40 TABLET, FILM COATED ORAL at 21:32

## 2024-12-26 ASSESSMENT — PAIN DESCRIPTION - LOCATION
LOCATION: CHEST
LOCATION: CHEST

## 2024-12-26 ASSESSMENT — ENCOUNTER SYMPTOMS
GASTROINTESTINAL NEGATIVE: 1
VOMITING: 0
SHORTNESS OF BREATH: 0
EYES NEGATIVE: 1
NAUSEA: 0
ALLERGIC/IMMUNOLOGIC NEGATIVE: 1
WHEEZING: 0
ABDOMINAL PAIN: 0

## 2024-12-26 ASSESSMENT — PAIN SCALES - GENERAL
PAINLEVEL_OUTOF10: 3
PAINLEVEL_OUTOF10: 4

## 2024-12-26 NOTE — PROGRESS NOTES
Pt  A & O x 4, speaking in full sentences with unlabored breathing.  Pt on bedside cardiac monitor.  Pt NSR with a rate of 74.  Pt c/o 3/10 chest pressure and is on a heparin drip continued from [facility].  Pt verbalized understanding of plan of care.  Consents signed.  Pt's  did return call and has been updated.  Pt ready for procedure at this time.

## 2024-12-26 NOTE — PROGRESS NOTES
Sedation Pre- Procedure Evaluation    Patient name: Rekha Russ  YOB: 1955  MRN: [id number]   Allergies:   Bactrim [sulfamethoxazole-trimethoprim] and Seasonal        Type Of Sedation  [x]local anesthesia  [x]moderate (conscious sedation)  []deep/analgesia      RN Focused History    Yes        No  N/A  []   [x]  Previous problem with airway anesthesia, sedation, or family history of the same    [x]   []  History Of tobacco, alcohol, or substance abuse     []   [x]  Problems associated with stridor, snoring, or sleep apnea    []   [] [x] Pediatric patient, history of premature birth or ventilator support (Moderate Sedation Only)     [x]   [] [] Moderate Sedation: Clear liquids within 6 hours of sedation and NPO within 2 hours    [x]   [] [] Deep Sedation:Clear liquids within 6 hours of sedation and NPO within 2 hours      NPO since: 12/24/24       Vitals, Cardiac Rhythm, Pain:   Vitals  Temp: 98.4 °F (36.9 °C)  Temp Source: Oral  Pulse: 84  Heart Rate Source: Monitor  Respirations: 18  BP: (!) 156/74  MAP (Calculated): 101  Cardiac Rhythm: Sinus rhythm  Pain Assessment  Pain Assessment: 0-10  Pain Level: 3  Pain Location: Chest  Pain Descriptors: Pressure      EKG:  EKG Interpretation: normal EKG, normal sinus rhythm, unchanged from previous tracings.      Nahomi Scale: Activity: Able to move four extremities voluntarily or on command  Respiration: Able to breathe and cough freely  Circulation: Blood pressure within 20% of preanesthesia level  Consciousness: Fully awake  Oxygen: SAO2 > 92% on room air   Nahomi Score: 10     Activity:  2 - Able to move 4 extremities voluntarily on command  Respiration:  2 - Able to breathe deeply and cough freely  Circulation:  2 - BP+/- 20mmHg of normal  Consciousness:  2 - Fully awake  Oxygen Saturation (color):  2 - Able to maintain oxygen saturation >92% on room air      Prior to Admission medications    Medication Sig Start Date End Date Taking?  Authorizing Provider   nitrofurantoin, macrocrystal-monohydrate, (MACROBID) 100 MG capsule Take 1 capsule by mouth 2 times daily for 7 days 12/24/24 12/31/24 Yes Giovanni Denise MD   famotidine (PEPCID) 20 MG tablet Take 1 tablet by mouth 2 times daily 12/24/24  Yes Giovanni Denise MD   dexAMETHasone (DECADRON) 4 MG tablet Take 1 tablet by mouth 2 times daily (with meals) for 10 days 12/24/24 1/3/25 Yes Giovanni Denise MD   atorvastatin (LIPITOR) 40 MG tablet Take 1 tablet by mouth nightly 10/5/24 12/26/24 Yes ProviderIris MD   clopidogrel (PLAVIX) 75 MG tablet Take 1 tablet by mouth daily 10/11/24  Yes Provider, MD Iris   losartan (COZAAR) 50 MG tablet Take 1 tablet by mouth daily 10/11/24  Yes ProviderIris MD   pantoprazole (PROTONIX) 40 MG tablet Take 1 tablet by mouth daily 9/23/24  Yes Provider, MD Iris   amLODIPine (NORVASC) 10 MG tablet Take 1 tablet by mouth daily 9/30/24  Yes Provider, MD Iris   OXcarbazepine (TRILEPTAL) 150 MG tablet TAKE 2 TABLETS BY MOUTH TWICE DAILY 4/28/22  Yes Provider, MD Iris   bisoprolol-hydroCHLOROthiazide (ZIAC) 5-6.25 MG per tablet Take 1 tablet by mouth daily   Yes Provider, MD Iris   nitroGLYCERIN (NITROSTAT) 0.4 MG SL tablet Place 1 tablet under the tongue every 5 minutes as needed 10/5/24 11/4/24  ProviderIris MD   fluticasone (FLONASE) 50 MCG/ACT nasal spray 2 sprays by Each Nostril route daily  Patient not taking: Reported on 12/25/2024 5/16/24   Kassy Peters DO        Electronically signed by Porsche Chavez RN on 12/26/2024 at 9:43 AM

## 2024-12-26 NOTE — PROGRESS NOTES
Patient returned from procedure. A+OX 4. Patient C/O pain in chest of 3-4/10. Right radial band in place intact. Right groin dressing dry and intact. No bleeding or hematoma noted at this time.

## 2024-12-26 NOTE — ED NOTES
Patient report given to KAMRON Ramesh at this time. Patient sent to floor with all belongings. Patient denied further questions.

## 2024-12-26 NOTE — PROGRESS NOTES
Per pt request attempted to reach her  and daughter to update on her care.  Unable to reach either at this time.

## 2024-12-26 NOTE — CARE COORDINATION
Definition and description of a heart attack explained.   Brief overview of the heart anatomy reviewed with pt.  CAD progression discussed.  During a MI, lack of oxygen and damage to heart muscle explained.   Symptoms of a MI reviewed.   Pt. reports experiencing:   Post MI complications reviewed including arrhythmias, decreased cardiac output, and inflammation (pericarditis).  Hospital course reviewed, including testing: Lab work, B/P, ECG, ECHO, Stress testing, and Heart Cath.   Treatments also reviewed from medication, angioplasty and stenting, to CABG.   Patient had: PCI and stenting to her mid LAD and PCI to her OM1.   Plan post discharge includes follow up with cardiology and cardiac rehab.  Physical activity discussed including cardiac rehab. Pt advised to follow their physician's discharge instructions for activity restrictions, if any.   Risk factors reviewed including: smoking, high cholesterol, HTN, DM, obesity, sedentary lifestyle, and stress. Pt. encouraged to make lifestyle changes to improve their health and lower these risk factors.   Stress and depression were also discussed. S/S depression reviewed as well as encouragement to talk with someone if symptoms occur.   Importance of follow up with the cardiologist reinforced.   MI Zone booklet also reviewed. Goal is to keep patient in the \"green\" zone. She verbalizes understanding to call the doctor ASAP when experiencing symptoms in the \"yellow\" zone. Instructed to call 911 when S/S of \"red\" zone begin. Reminder to never drive after taking nitroglycerine.  Copy of MI booklet and zone pamphlet provided to the patient for review.   Offer for patient to verbalize any questions. All questions answered and patient denies further questions at this time.    Electronically signed by Joselyn Bear RN on 12/26/2024 at 4:49 PM

## 2024-12-26 NOTE — H&P
DATE OF CONSULTATION  12/26/2024    CONSULTANT  Bobby Franco DO     REQUESTING PHYSICIAN  Bobby Franco DO         REASON FOR CONSULTATION  No chief complaint on file.      Hospital Day: 0       Patient is a 69 y.o. female who presents with a chief complaint of chest pain. Patient is followed on a regular basis by Glen Gomez MD. patient with past medical surgical disease status post recent PCI at [facility] approximately 2 months ago.  Noted to have significant limited cardiac enzyme on presentation.  Patient with history of hypertension  CT of the chest is suspicious for small acute pulm embolism in the proximal lingula segment  Echocardiogram in October 2024 at [facility] showed ejection fraction of 55 to 60%.  No significant valve abnormalities.    Per records patient underwent POBA on D1      CORONARY ANGIOGRAPHY: 10/2024  LEFT MAIN TRUNK: No Stenosis     LEFT ANTERIOR DESCENDING: Less Than 40% Stenosis Location: Mid     DIAGONAL #1: 95% Stenosis Location: Proximal and Ostial     LEFT CIRCUMFLEX: Less Than 40% Stenosis Location: Mid     MARGINAL #1: Less Than 40% Stenosis Location: Diffuse     DOMINANT: NO  RIGHT CORONARY: Less Than 40% Stenosis Location: Diffuse       Past Medical History:   Diagnosis Date    Hypertension     Trigeminal neuralgia pain       Patient Active Problem List   Diagnosis    Dysfunction of right eustachian tube    Cerumen debris on tympanic membrane of both ears    Acute abdominal pain    Abdominal bloating    Anemia    Chronic kidney disease, stage III (moderate) ([facility]ly [facility])    Constipation    Corn    Gastroesophageal reflux disease without esophagitis    Hypercalcemia    Morbid obesity    Nausea    Prediabetes    Hyperparathyroidism (HCC)    Chest pressure    Arm numbness left    Pulmonary embolism, unspecified chronicity, unspecified pulmonary embolism type, unspecified whether acute cor pulmonale present (HCC)       Past Surgical History:   Procedure Laterality

## 2024-12-26 NOTE — PROGRESS NOTES
0609: EKG completed at this time as per dr. Martínez. EKG printout handed off from this RRT to nursing.

## 2024-12-26 NOTE — PROGRESS NOTES
Heparin stopped at 1035 before patient was brought to lab. Patient came from [facility] on heparin gtt.

## 2024-12-26 NOTE — PROGRESS NOTES
Right radial band removed. Quik clot and Tegaderm applied to site. No bleeding or hematoma noted at this time. Right femoral site remained intact.

## 2024-12-26 NOTE — PROGRESS NOTES
This nurse arrived this morning with a critical troponin lab reported of 2,059 and d-dimer 0.69. Dr. Gallagher notified and heparin gtt was started and labs was drawn and taken down to . Dr. Cornejo consulted and advise pt to be transferred up [place] to heart cath. Tele removed, our squad transferred pt with life pads attached. Report given to Elvira ruvalcaba [place] in heart cath.

## 2024-12-26 NOTE — H&P
Hospital Medicine History & Physical      PCP: Glen Cat MD    Date of Admission: 12/25/2024    Date of Service: 12/26/24      Chief Complaint:  CP      History Of Present Illness:  69 y.o. female who presented to [facility] with above complains. Patient was admitted to [facility] 2 mts ago with CP, underwent card cath due to NSTEMI and had Stent placed,. Was compliant with medical management at home. 3 days ago she start to developed periodic CP/heaviness which were come/go. Visit ER 2 days ago and she was released home. Yesterday CP was persistent and constant. Was seeing in ER and was diagnosed with small  lingual segment PE, after stabilization was admitted to the floor. Today AM she still experienced chest heaviness, no dyspnea     Past Medical History:          Diagnosis Date    Hypertension     Trigeminal neuralgia pain        Past Surgical History:          Procedure Laterality Date    HYSTERECTOMY (CERVIX STATUS UNKNOWN)      TONSILLECTOMY         Medications Prior to Admission:      Prior to Admission medications    Medication Sig Start Date End Date Taking? Authorizing Provider   nitrofurantoin, macrocrystal-monohydrate, (MACROBID) 100 MG capsule Take 1 capsule by mouth 2 times daily for 7 days 12/24/24 12/31/24  Giovanni Denise MD   famotidine (PEPCID) 20 MG tablet Take 1 tablet by mouth 2 times daily 12/24/24   Giovanni Denise MD   dexAMETHasone (DECADRON) 4 MG tablet Take 1 tablet by mouth 2 times daily (with meals) for 10 days 12/24/24 1/3/25  Giovanni Denise MD   atorvastatin (LIPITOR) 40 MG tablet Take 1 tablet by mouth nightly 10/5/24 11/4/24  Iris Du MD   clopidogrel (PLAVIX) 75 MG tablet Take 1 tablet by mouth daily 10/11/24   Iris Du MD   losartan (COZAAR) 50 MG tablet Take 1 tablet by mouth daily 10/11/24   Iris Du MD   nitroGLYCERIN (NITROSTAT) 0.4 MG SL tablet Place 1 tablet under the tongue every 5 minutes as needed

## 2024-12-26 NOTE — BRIEF OP NOTE
Section of Cardiology  Adult Brief Cardiac Cath Procedure Note           Procedure(s):  LHC, b/l coronary angio, PCI of the proximal to mid LAD with drug-eluting stent 4.0 x 12 EFRAIN, POBA of the ostial diagonal 1     Pre-operative Diagnosis: Non-ST elevation MI     H&P Status: Completed and reviewed.      Post-operative Diagnosis:       LV ejection fraction of 40%.  Anterior wall hypokinesis  Left main large-caliber vessel, short.  No stenosis  LAD large caliber vessel.  Mid 80% at diagonal 1.  Diagonal 1 is a moderate caliber vessel with 99% ostial stenosis  Circumflex is a large-caliber vessel, mild diffuse disease  RCA large-caliber vessel, dominant.  Mild diffuse disease        Findings:  See full report     Complications:  none     Primary Proceduralist:   Dr.Wes Franco DO     Plan:  Dual antiplatelet therapy     Cardiac rehab nurse will follow-up with the patient and provide further education.  Phase 1 and 2 CR order was initiated and face to order sent outpatient cardiac rehab program, ADLs, smoking cessation, home exercise program, cardiac risk factor modification, heart healthy nutrition and cardiac medication education     Full procedure note to follow

## 2024-12-27 LAB
ANION GAP SERPL CALCULATED.3IONS-SCNC: 10 MEQ/L (ref 9–15)
ANTI-XA UNFRAC HEPARIN: 0.25 IU/ML
ANTI-XA UNFRAC HEPARIN: 0.57 IU/ML
BUN SERPL-MCNC: 10 MG/DL (ref 8–23)
CALCIUM SERPL-MCNC: 9.7 MG/DL (ref 8.5–9.9)
CHLORIDE SERPL-SCNC: 103 MEQ/L (ref 95–107)
CO2 SERPL-SCNC: 24 MEQ/L (ref 20–31)
CREAT SERPL-MCNC: 0.62 MG/DL (ref 0.5–0.9)
EKG ATRIAL RATE: 73 BPM
EKG P AXIS: 0 DEGREES
EKG P-R INTERVAL: 176 MS
EKG Q-T INTERVAL: 428 MS
EKG QRS DURATION: 88 MS
EKG QTC CALCULATION (BAZETT): 471 MS
EKG R AXIS: 60 DEGREES
EKG T AXIS: 72 DEGREES
EKG VENTRICULAR RATE: 73 BPM
ERYTHROCYTE [DISTWIDTH] IN BLOOD BY AUTOMATED COUNT: 12.6 % (ref 11.5–14.5)
GLUCOSE BLD-MCNC: 127 MG/DL (ref 70–99)
GLUCOSE BLD-MCNC: 176 MG/DL (ref 70–99)
GLUCOSE BLD-MCNC: 223 MG/DL (ref 70–99)
GLUCOSE SERPL-MCNC: 134 MG/DL (ref 70–99)
HCT VFR BLD AUTO: 34.4 % (ref 37–47)
HGB BLD-MCNC: 11.7 G/DL (ref 12–16)
MCH RBC QN AUTO: 31 PG (ref 27–31.3)
MCHC RBC AUTO-ENTMCNC: 34 % (ref 33–37)
MCV RBC AUTO: 91.2 FL (ref 79.4–94.8)
PERFORMED ON: ABNORMAL
PLATELET # BLD AUTO: 182 K/UL (ref 130–400)
POC ACT LR: 240 SEC
POC ACT LR: 378 SEC
POTASSIUM SERPL-SCNC: 3.5 MEQ/L (ref 3.4–4.9)
RBC # BLD AUTO: 3.77 M/UL (ref 4.2–5.4)
SODIUM SERPL-SCNC: 137 MEQ/L (ref 135–144)
WBC # BLD AUTO: 8 K/UL (ref 4.8–10.8)

## 2024-12-27 PROCEDURE — 85520 HEPARIN ASSAY: CPT

## 2024-12-27 PROCEDURE — 93010 ELECTROCARDIOGRAM REPORT: CPT | Performed by: INTERNAL MEDICINE

## 2024-12-27 PROCEDURE — 6370000000 HC RX 637 (ALT 250 FOR IP): Performed by: INTERNAL MEDICINE

## 2024-12-27 PROCEDURE — 36415 COLL VENOUS BLD VENIPUNCTURE: CPT

## 2024-12-27 PROCEDURE — 2500000003 HC RX 250 WO HCPCS: Performed by: INTERNAL MEDICINE

## 2024-12-27 PROCEDURE — APPSS30 APP SPLIT SHARED TIME 16-30 MINUTES

## 2024-12-27 PROCEDURE — 6360000002 HC RX W HCPCS: Performed by: INTERNAL MEDICINE

## 2024-12-27 PROCEDURE — 80048 BASIC METABOLIC PNL TOTAL CA: CPT

## 2024-12-27 PROCEDURE — 2060000000 HC ICU INTERMEDIATE R&B

## 2024-12-27 PROCEDURE — 99232 SBSQ HOSP IP/OBS MODERATE 35: CPT | Performed by: INTERNAL MEDICINE

## 2024-12-27 PROCEDURE — 85027 COMPLETE CBC AUTOMATED: CPT

## 2024-12-27 RX ADMIN — SODIUM CHLORIDE, PRESERVATIVE FREE 10 ML: 5 INJECTION INTRAVENOUS at 21:11

## 2024-12-27 RX ADMIN — INSULIN LISPRO 2 UNITS: 100 INJECTION, SOLUTION INTRAVENOUS; SUBCUTANEOUS at 21:11

## 2024-12-27 RX ADMIN — ASPIRIN 81 MG: 81 TABLET, COATED ORAL at 08:21

## 2024-12-27 RX ADMIN — ATORVASTATIN CALCIUM 40 MG: 40 TABLET, FILM COATED ORAL at 21:11

## 2024-12-27 RX ADMIN — CLOPIDOGREL BISULFATE 75 MG: 75 TABLET ORAL at 08:21

## 2024-12-27 RX ADMIN — METOPROLOL SUCCINATE 50 MG: 50 TABLET, EXTENDED RELEASE ORAL at 08:21

## 2024-12-27 RX ADMIN — PANTOPRAZOLE SODIUM 40 MG: 40 TABLET, DELAYED RELEASE ORAL at 08:21

## 2024-12-27 RX ADMIN — HEPARIN SODIUM 2000 UNITS: 1000 INJECTION INTRAVENOUS; SUBCUTANEOUS at 00:54

## 2024-12-27 RX ADMIN — OXCARBAZEPINE 300 MG: 300 TABLET, FILM COATED ORAL at 08:21

## 2024-12-27 RX ADMIN — LOSARTAN POTASSIUM 25 MG: 25 TABLET, FILM COATED ORAL at 08:21

## 2024-12-27 RX ADMIN — OXCARBAZEPINE 300 MG: 300 TABLET, FILM COATED ORAL at 21:11

## 2024-12-27 ASSESSMENT — ENCOUNTER SYMPTOMS
BACK PAIN: 0
RHINORRHEA: 0
CONSTIPATION: 0
PHOTOPHOBIA: 0
SINUS PRESSURE: 0
CHEST TIGHTNESS: 0
SHORTNESS OF BREATH: 0
NAUSEA: 0
DIARRHEA: 0
COLOR CHANGE: 0
SHORTNESS OF BREATH: 0
SORE THROAT: 0
EYE PAIN: 0
COUGH: 0
ABDOMINAL DISTENTION: 0
WHEEZING: 0
ABDOMINAL PAIN: 0
APNEA: 0
VOMITING: 0

## 2024-12-27 ASSESSMENT — PAIN SCALES - GENERAL: PAINLEVEL_OUTOF10: 0

## 2024-12-27 NOTE — FLOWSHEET NOTE
1340- Assumed patient care. Handoff from Nell BLOUNT nurse. Patient In bed. No signs of acute distress. R radial dressing intact. No hematoma or bleeding from site. Small bruising seen around site.  Call light in reach. Shift assessment completed.   1622- Asked cardio consult if okay to discontinue BG checks since patient is not a diabetic and patients BG has been controlled since she has been hospitalized. Said okay to discontinue.     Electronically signed by Ursula Lo RN on 12/27/2024 at 5:21 PM

## 2024-12-27 NOTE — CARE COORDINATION
Case Management Assessment  Initial Evaluation    Date/Time of Evaluation: 12/27/2024 11:24 AM  Assessment Completed by: Roseann Carvalho RN    If patient is discharged prior to next notation, then this note serves as note for discharge by case management.    Patient Name: Rekha Russ                   YOB: 1955  Diagnosis: Chest pain, unspecified type [R07.9]  NSTEMI (non-ST elevated myocardial infarction) (HCC) [I21.4]                   Date / Time: 12/26/2024  9:19 AM    Patient Admission Status: Inpatient   Readmission Risk (Low < 19, Mod (19-27), High > 27): Readmission Risk Score: 9.2    Current PCP: Glen Cat MD  PCP verified by CM? Yes    Chart Reviewed: Yes      History Provided by: Patient  Patient Orientation: Alert and Oriented, Person, Place, Situation, Self    Patient Cognition: Alert    Hospitalization in the last 30 days (Readmission):  No    If yes, Readmission Assessment in  Navigator will be completed.    Advance Directives:      Code Status: Full Code   Patient's Primary Decision Maker is: Legal Next of Kin    Primary Decision Maker: HERON RUSS - Spouse - [phone]    Secondary Decision Maker: KalidouglasArtem aguillon - Child - [phone]    Discharge Planning:    Patient lives with: Spouse/Significant Other Type of Home: House  Primary Care Giver: Self  Patient Support Systems include: Spouse/Significant Other, Children   Current Financial resources:    Current community resources:    Current services prior to admission: None            Current DME:              Type of Home Care services:  None    ADLS  Prior functional level: Independent in ADLs/IADLs  Current functional level: Independent in ADLs/IADLs    PT AM-PAC:   /24  OT AM-PAC:   /24    Family can provide assistance at DC: Yes  Would you like Case Management to discuss the discharge plan with any other family members/significant others, and if so, who? No  Plans to Return to Present Housing:  Yes  Other Identified Issues/Barriers to RETURNING to current housing:   Potential Assistance needed at discharge: N/A            Potential DME:    Patient expects to discharge to: Unknown  Plan for transportation at discharge:      Financial    Payor: [facility] MEDICARE / Plan: [facility] MEDICARE ADVANTAGE / Product Type: *No Product type* /     Does insurance require precert for SNF: Yes    Potential assistance Purchasing Medications:    Meds-to-Beds request: Yes      [facility] [address] [place], [place] - [address] [address] - P [phone] - F [phone]  [address]  [facility] [address]  Phone: [phone] Fax: [phone]      Notes:    Factors facilitating achievement of predicted outcomes: Cooperative and Pleasant    Barriers to discharge: Medical complications    Additional Case Management Notes: HOME WITH SPOUSE, HE DRIVES. SHE IS INDEPENDENT. NO DME AND NO 02. DENIED NEEDS    The Plan for Transition of Care is related to the following treatment goals of Chest pain, unspecified type [R07.9]  NSTEMI (non-ST elevated myocardial infarction) (HCC) [I21.4]    IF APPLICABLE: The Patient and/or patient representative Rekha and her family were provided with a choice of provider and agrees with the discharge plan. Freedom of choice list with basic dialogue that supports the patient's individualized plan of care/goals and shares the quality data associated with the providers was provided to:     Patient Representative Name:       The Patient and/or Patient Representative Agree with the Discharge Plan?      Roseann Carvalho RN  Case Management Department

## 2024-12-27 NOTE — ACP (ADVANCE CARE PLANNING)
Advance Care Planning   Healthcare Decision Maker:    Primary Decision Maker: DEJONHERON BENJAMIN - Spouse - [phone]    Secondary Decision Maker: PetronaArtem - Child - [phone]    Click here to complete Healthcare Decision Makers including selection of the Healthcare Decision Maker Relationship (ie \"Primary\").  Today we documented Decision Maker(s) consistent with Legal Next of Kin hierarchy.

## 2024-12-27 NOTE — CARDIO/PULMONARY
Cardiac Rehab Consult Note  Name: Rekha Russ  Age: 69 y.o.  Gender: female    Chief Complaint:No chief complaint on file.    Primary Care Provider: Glne Cat MD  InpatientTreatment Team: Treatment Team:   Bobby Franco, Bobby Huffman DO Rosso, Jennifer A, RN Cason, Deanna L, Nell [place], Keon Viera Robin M, RN Grandberry, Ursula Ramos RN  Admission Date: 12/26/2024    Consult Received from Dr. Franco .  Reason for consult NSTEMI and PCI.  Patient visited at bedside.  Program and benefits introduced.  Brochures given. Patient's response interested.      Principal Problem:    NSTEMI (non-ST elevated myocardial infarction) (HCC)  Resolved Problems:    * No resolved hospital problems. *       Plan: CR after DC    All of pt questions were answered.Case will be discussed with physician when appropriate.     Electronically signed by Rodríguez Craig on 12/27/2024 at 1:08 PM

## 2024-12-27 NOTE — PROGRESS NOTES
Progress Note  Patient: Rekha Russ  Unit/Bed: W186/W186-01  YOB: 1955  MRN: [id number]  Acct: [id number]   Admitting Diagnosis: Chest pain, unspecified type [R07.9]  NSTEMI (non-ST elevated myocardial infarction) (HCC) [I21.4]  Date:  12/26/2024  Hospital Day: 1    Chief Complaint:  Chest pain    Subjective  Patient is a 69 y.o. female who presents with a chief complaint of chest pain. Patient is followed on a regular basis by Glen Gomez MD. patient with past medical surgical disease status post recent PCI at [facility] approximately 2 months ago.  Noted to have significant limited cardiac enzyme on presentation.  Patient with history of hypertension  CT of the chest is suspicious for small acute pulm embolism in the proximal lingula segment  Echocardiogram in October 2024 at [facility] showed ejection fraction of 55 to 60%.  No significant valve abnormalities.    12/27/2024: Patient resting in bed comfortably.  Denies shortness of breath or chest pain.  Patient is status post [facility], b/l coronary angio, PCI of the proximal to mid LAD with drug-eluting stent 4.0 x 12 EFRAIN, POBA of the ostial diagonal 1 on 12/26/2024  Findings included  LV ejection fraction of 40%.  Anterior wall hypokinesis  Left main large-caliber vessel, short.  No stenosis  LAD large caliber vessel.  Mid 80% at diagonal 1.  Diagonal 1 is a moderate caliber vessel with 99% ostial stenosis  Circumflex is a large-caliber vessel, mild diffuse disease  RCA large-caliber vessel, dominant.  Mild diffuse disease    Vital signs stable.  A.m. labs reviewed.    Review of Systems:   Review of Systems   Respiratory:  Negative for chest tightness and shortness of breath.    Cardiovascular:  Negative for chest pain, palpitations and leg swelling.   All other systems reviewed and are negative.        Physical Examination:    BP (!) 141/71   Pulse 82   Temp 98.6 °F (37 °C) (Oral)   Resp 20   Ht 1.651 m (5' 5\")    Wt 84.8 kg (187 lb)   LMP  (LMP Unknown)   SpO2 96%   Breastfeeding No   BMI 31.12 kg/m²    Physical Exam  Cardiovascular:      Rate and Rhythm: Normal rate and regular rhythm.   Pulmonary:      Effort: Pulmonary effort is normal.      Breath sounds: Normal breath sounds.   Abdominal:      General: Bowel sounds are normal.   Musculoskeletal:      Right lower leg: No edema.      Left lower leg: No edema.   Skin:     General: Skin is warm and dry.   Neurological:      General: No focal deficit present.      Mental Status: She is alert.   Psychiatric:         Mood and Affect: Mood normal.         LABS:  CBC:   Lab Results   Component Value Date/Time    WBC 8.0 12/27/2024 05:23 AM    RBC 3.77 12/27/2024 05:23 AM    HGB 11.7 12/27/2024 05:23 AM    HCT 34.4 12/27/2024 05:23 AM    MCV 91.2 12/27/2024 05:23 AM    MCH 31.0 12/27/2024 05:23 AM    MCHC 34.0 12/27/2024 05:23 AM    RDW 12.6 12/27/2024 05:23 AM     12/27/2024 05:23 AM     CBC with Differential:   Lab Results   Component Value Date/Time    WBC 8.0 12/27/2024 05:23 AM    RBC 3.77 12/27/2024 05:23 AM    HGB 11.7 12/27/2024 05:23 AM    HCT 34.4 12/27/2024 05:23 AM     12/27/2024 05:23 AM    MCV 91.2 12/27/2024 05:23 AM    MCH 31.0 12/27/2024 05:23 AM    MCHC 34.0 12/27/2024 05:23 AM    RDW 12.6 12/27/2024 05:23 AM    LYMPHOPCT 15.6 12/26/2024 06:20 AM    MONOPCT 6.2 12/26/2024 06:20 AM    EOSPCT 0.0 12/26/2024 06:20 AM    BASOPCT 0.4 12/26/2024 06:20 AM    MONOSABS 0.7 12/26/2024 06:20 AM    LYMPHSABS 1.6 12/26/2024 06:20 AM    EOSABS 0.0 12/26/2024 06:20 AM    BASOSABS 0.0 12/26/2024 06:20 AM     CMP:    Lab Results   Component Value Date/Time     12/27/2024 05:23 AM    K 3.5 12/27/2024 05:23 AM    K 3.5 12/26/2024 06:20 AM     12/27/2024 05:23 AM    CO2 24 12/27/2024 05:23 AM    BUN 10 12/27/2024 05:23 AM    CREATININE 0.62 12/27/2024 05:23 AM    LABGLOM >90.0 12/27/2024 05:23 AM    GLUCOSE 134 12/27/2024 05:23 AM    CALCIUM 9.7

## 2024-12-28 VITALS
HEART RATE: 84 BPM | WEIGHT: 187 LBS | DIASTOLIC BLOOD PRESSURE: 72 MMHG | TEMPERATURE: 97.3 F | OXYGEN SATURATION: 96 % | SYSTOLIC BLOOD PRESSURE: 137 MMHG | BODY MASS INDEX: 31.16 KG/M2 | HEIGHT: 65 IN | RESPIRATION RATE: 16 BRPM

## 2024-12-28 LAB
ERYTHROCYTE [DISTWIDTH] IN BLOOD BY AUTOMATED COUNT: 12.6 % (ref 11.5–14.5)
GLUCOSE BLD-MCNC: 135 MG/DL (ref 70–99)
GLUCOSE BLD-MCNC: 150 MG/DL (ref 70–99)
HCT VFR BLD AUTO: 35.3 % (ref 37–47)
HGB BLD-MCNC: 11.8 G/DL (ref 12–16)
MCH RBC QN AUTO: 31.1 PG (ref 27–31.3)
MCHC RBC AUTO-ENTMCNC: 33.4 % (ref 33–37)
MCV RBC AUTO: 93.1 FL (ref 79.4–94.8)
PERFORMED ON: ABNORMAL
PERFORMED ON: ABNORMAL
PLATELET # BLD AUTO: 175 K/UL (ref 130–400)
RBC # BLD AUTO: 3.79 M/UL (ref 4.2–5.4)
WBC # BLD AUTO: 6.8 K/UL (ref 4.8–10.8)

## 2024-12-28 PROCEDURE — 36415 COLL VENOUS BLD VENIPUNCTURE: CPT

## 2024-12-28 PROCEDURE — 85027 COMPLETE CBC AUTOMATED: CPT

## 2024-12-28 PROCEDURE — 6370000000 HC RX 637 (ALT 250 FOR IP): Performed by: INTERNAL MEDICINE

## 2024-12-28 PROCEDURE — 2500000003 HC RX 250 WO HCPCS: Performed by: INTERNAL MEDICINE

## 2024-12-28 PROCEDURE — 99239 HOSP IP/OBS DSCHRG MGMT >30: CPT | Performed by: INTERNAL MEDICINE

## 2024-12-28 RX ORDER — ASPIRIN 81 MG/1
81 TABLET ORAL DAILY
Status: ON HOLD | COMMUNITY
Start: 2024-12-28 | End: 2025-01-27

## 2024-12-28 RX ORDER — METOPROLOL SUCCINATE 50 MG/1
50 TABLET, EXTENDED RELEASE ORAL DAILY
Qty: 30 TABLET | Refills: 3 | Status: ON HOLD | OUTPATIENT
Start: 2024-12-29

## 2024-12-28 RX ORDER — ASPIRIN 81 MG/1
81 TABLET ORAL DAILY
Status: ON HOLD | COMMUNITY
Start: 2024-12-29

## 2024-12-28 RX ADMIN — LOSARTAN POTASSIUM 25 MG: 25 TABLET, FILM COATED ORAL at 08:23

## 2024-12-28 RX ADMIN — METOPROLOL SUCCINATE 50 MG: 50 TABLET, EXTENDED RELEASE ORAL at 08:23

## 2024-12-28 RX ADMIN — OXCARBAZEPINE 300 MG: 300 TABLET, FILM COATED ORAL at 08:24

## 2024-12-28 RX ADMIN — CLOPIDOGREL BISULFATE 75 MG: 75 TABLET ORAL at 08:23

## 2024-12-28 RX ADMIN — SODIUM CHLORIDE, PRESERVATIVE FREE 10 ML: 5 INJECTION INTRAVENOUS at 08:25

## 2024-12-28 RX ADMIN — PANTOPRAZOLE SODIUM 40 MG: 40 TABLET, DELAYED RELEASE ORAL at 08:23

## 2024-12-28 RX ADMIN — ASPIRIN 81 MG: 81 TABLET, COATED ORAL at 08:23

## 2024-12-28 ASSESSMENT — PAIN SCALES - GENERAL: PAINLEVEL_OUTOF10: 0

## 2024-12-28 NOTE — DISCHARGE SUMMARY
12/28/2024  Value: 11.8 (L)    Ref range: 12.0 - 16.0 g/dL   Status: Final  Hematocrit                                    Date: 12/28/2024  Value: 35.3 (L)    Ref range: 37.0 - 47.0 %      Status: Final  MCV                                           Date: 12/28/2024  Value: 93.1        Ref range: 79.4 - 94.8 fL     Status: Final  MCH                                           Date: 12/28/2024  Value: 31.1        Ref range: 27.0 - 31.3 pg     Status: Final  MCHC                                          Date: 12/28/2024  Value: 33.4        Ref range: 33.0 - 37.0 %      Status: Final  RDW                                           Date: 12/28/2024  Value: 12.6        Ref range: 11.5 - 14.5 %      Status: Final  Platelets                                     Date: 12/28/2024  Value: 175         Ref range: 130 - 400 K/uL     Status: Final  POC Glucose                                   Date: 12/27/2024  Value: 223 (H)     Ref range: 70 - 99 mg/dl      Status: Final  Performed on                                  Date: 12/27/2024  Value: ACCU-CHEK     Status: Final                Comment: Notified RN or MD  POC Glucose                                   Date: 12/28/2024  Value: 135 (H)     Ref range: 70 - 99 mg/dl      Status: Final  Performed on                                  Date: 12/28/2024  Value: ACCU-CHEK     Status: Final                Comment: Notified RN or MD  ------------    Radiology last 7 days:  CTA CHEST W WO CONTRAST PE Eval    Result Date: 12/25/2024  Suspicious for a small acute pulmonary embolus of proximal lingula segment. The findings were sent to the Radiology Results Communication Center at 7:15 pm on 12/25/2024 to be communicated to a licensed caregiver.     XR ACUTE ABD SERIES CHEST 1 VW    Result Date: 12/25/2024  1. No acute cardiopulmonary disease. 2. Moderate amount of gas and stool throughout the colon. 3. Solitary gallstone.     XR CHEST PORTABLE    Result Date: 12/24/2024  No acute

## 2024-12-28 NOTE — PLAN OF CARE
Problem: Pain  Goal: Verbalizes/displays adequate comfort level or baseline comfort level  12/28/2024 1050 by Carrie Ignacio RN  Outcome: Adequate for Discharge  12/27/2024 2247 by Oc Finney RN  Outcome: Progressing     Problem: ABCDS Injury Assessment  Goal: Absence of physical injury  12/28/2024 1050 by Carrie Ignacio RN  Outcome: Adequate for Discharge  12/27/2024 2247 by Oc Finney RN  Outcome: Progressing     Problem: Discharge Planning  Goal: Discharge to home or other facility with appropriate resources  12/28/2024 1050 by aCrrie Ignacio RN  Outcome: Adequate for Discharge  Flowsheets (Taken 12/28/2024 0820)  Discharge to home or other facility with appropriate resources:   Identify barriers to discharge with patient and caregiver   Identify discharge learning needs (meds, wound care, etc)  12/27/2024 2247 by Oc Finney RN  Outcome: Progressing     Problem: Safety - Adult  Goal: Free from fall injury  12/28/2024 1050 by Carrie Ignacio RN  Outcome: Adequate for Discharge  12/27/2024 2247 by Oc Finney RN  Outcome: Progressing

## 2024-12-28 NOTE — PLAN OF CARE
Problem: Pain  Goal: Verbalizes/displays adequate comfort level or baseline comfort level  12/27/2024 2247 by Oc Finney RN  Outcome: Progressing  12/27/2024 1339 by Ursula Lo RN  Outcome: Progressing     Problem: ABCDS Injury Assessment  Goal: Absence of physical injury  12/27/2024 2247 by Oc Finney RN  Outcome: Progressing  12/27/2024 1339 by Ursula Lo RN  Outcome: Progressing     Problem: Discharge Planning  Goal: Discharge to home or other facility with appropriate resources  12/27/2024 2247 by Oc Finney RN  Outcome: Progressing  12/27/2024 1339 by Ursula Lo RN  Outcome: Progressing     Problem: Safety - Adult  Goal: Free from fall injury  12/27/2024 2247 by Oc Finney RN  Outcome: Progressing  12/27/2024 1339 by Ursula Lo RN  Outcome: Progressing

## 2024-12-29 ENCOUNTER — HOSPITAL ENCOUNTER (INPATIENT)
Age: 69
LOS: 10 days | Discharge: HOME HEALTH | DRG: 321 | End: 2025-01-09
Attending: STUDENT IN AN ORGANIZED HEALTH CARE EDUCATION/TRAINING PROGRAM | Admitting: INTERNAL MEDICINE
Payer: MEDICARE

## 2024-12-29 ENCOUNTER — APPOINTMENT (OUTPATIENT)
Dept: GENERAL RADIOLOGY | Age: 69
DRG: 321 | End: 2024-12-29
Payer: MEDICARE

## 2024-12-29 DIAGNOSIS — I26.99 PULMONARY EMBOLISM, UNSPECIFIED CHRONICITY, UNSPECIFIED PULMONARY EMBOLISM TYPE, UNSPECIFIED WHETHER ACUTE COR PULMONALE PRESENT (HCC): Primary | ICD-10-CM

## 2024-12-29 DIAGNOSIS — I21.3 ST ELEVATION MYOCARDIAL INFARCTION (STEMI), UNSPECIFIED ARTERY (HCC): ICD-10-CM

## 2024-12-29 DIAGNOSIS — I21.3 STEMI (ST ELEVATION MYOCARDIAL INFARCTION) (HCC): ICD-10-CM

## 2024-12-29 DIAGNOSIS — I46.9 CARDIOPULMONARY ARREST: ICD-10-CM

## 2024-12-29 LAB
ALBUMIN SERPL-MCNC: 3.7 G/DL (ref 3.5–4.6)
ALP SERPL-CCNC: 118 U/L (ref 40–130)
ALT SERPL-CCNC: 144 U/L (ref 0–33)
ANION GAP SERPL CALCULATED.3IONS-SCNC: 14 MEQ/L (ref 9–15)
APTT PPP: 57.7 SEC (ref 24.4–36.8)
AST SERPL-CCNC: 138 U/L (ref 0–35)
BASOPHILS # BLD: 0 K/UL (ref 0–0.2)
BASOPHILS NFR BLD: 0.2 %
BILIRUB SERPL-MCNC: 0.4 MG/DL (ref 0.2–0.7)
BUN SERPL-MCNC: 19 MG/DL (ref 8–23)
CALCIUM SERPL-MCNC: 9.8 MG/DL (ref 8.5–9.9)
CHLORIDE SERPL-SCNC: 100 MEQ/L (ref 95–107)
CO2 SERPL-SCNC: 22 MEQ/L (ref 20–31)
CREAT SERPL-MCNC: 0.72 MG/DL (ref 0.5–0.9)
ECHO BSA: 1.97 M2
EOSINOPHIL # BLD: 0.2 K/UL (ref 0–0.7)
EOSINOPHIL NFR BLD: 1.4 %
ERYTHROCYTE [DISTWIDTH] IN BLOOD BY AUTOMATED COUNT: 12.6 % (ref 11.5–14.5)
GLOBULIN SER CALC-MCNC: 2.6 G/DL (ref 2.3–3.5)
GLUCOSE SERPL-MCNC: 242 MG/DL (ref 70–99)
HCT VFR BLD AUTO: 33.5 % (ref 37–47)
HGB BLD-MCNC: 11.4 G/DL (ref 12–16)
INR PPP: 1.1
LYMPHOCYTES # BLD: 3.4 K/UL (ref 1–4.8)
LYMPHOCYTES NFR BLD: 21.1 %
MCH RBC QN AUTO: 32.3 PG (ref 27–31.3)
MCHC RBC AUTO-ENTMCNC: 34 % (ref 33–37)
MCV RBC AUTO: 94.9 FL (ref 79.4–94.8)
MONOCYTES # BLD: 0.9 K/UL (ref 0.2–0.8)
MONOCYTES NFR BLD: 5.5 %
NEUTROPHILS # BLD: 11.5 K/UL (ref 1.4–6.5)
NEUTS SEG NFR BLD: 70.7 %
PLATELET # BLD AUTO: 235 K/UL (ref 130–400)
POTASSIUM SERPL-SCNC: 3.5 MEQ/L (ref 3.4–4.9)
PROT SERPL-MCNC: 6.3 G/DL (ref 6.3–8)
PROTHROMBIN TIME: 14.1 SEC (ref 12.3–14.9)
RBC # BLD AUTO: 3.53 M/UL (ref 4.2–5.4)
SODIUM SERPL-SCNC: 136 MEQ/L (ref 135–144)
TROPONIN, HIGH SENSITIVITY: 1632 NG/L (ref 0–19)
WBC # BLD AUTO: 16.3 K/UL (ref 4.8–10.8)

## 2024-12-29 PROCEDURE — 93458 L HRT ARTERY/VENTRICLE ANGIO: CPT

## 2024-12-29 PROCEDURE — 80053 COMPREHEN METABOLIC PANEL: CPT

## 2024-12-29 PROCEDURE — 99153 MOD SED SAME PHYS/QHP EA: CPT | Performed by: INTERNAL MEDICINE

## 2024-12-29 PROCEDURE — 6360000004 HC RX CONTRAST MEDICATION: Performed by: INTERNAL MEDICINE

## 2024-12-29 PROCEDURE — 84484 ASSAY OF TROPONIN QUANT: CPT

## 2024-12-29 PROCEDURE — 85730 THROMBOPLASTIN TIME PARTIAL: CPT

## 2024-12-29 PROCEDURE — C1894 INTRO/SHEATH, NON-LASER: HCPCS | Performed by: INTERNAL MEDICINE

## 2024-12-29 PROCEDURE — 92941 PRQ TRLML REVSC TOT OCCL AMI: CPT

## 2024-12-29 PROCEDURE — 85610 PROTHROMBIN TIME: CPT

## 2024-12-29 PROCEDURE — 4A023N7 MEASUREMENT OF CARDIAC SAMPLING AND PRESSURE, LEFT HEART, PERCUTANEOUS APPROACH: ICD-10-PCS | Performed by: INTERNAL MEDICINE

## 2024-12-29 PROCEDURE — 99285 EMERGENCY DEPT VISIT HI MDM: CPT

## 2024-12-29 PROCEDURE — 85025 COMPLETE CBC W/AUTO DIFF WBC: CPT

## 2024-12-29 PROCEDURE — B2111ZZ FLUOROSCOPY OF MULTIPLE CORONARY ARTERIES USING LOW OSMOLAR CONTRAST: ICD-10-PCS | Performed by: INTERNAL MEDICINE

## 2024-12-29 PROCEDURE — B2151ZZ FLUOROSCOPY OF LEFT HEART USING LOW OSMOLAR CONTRAST: ICD-10-PCS | Performed by: INTERNAL MEDICINE

## 2024-12-29 PROCEDURE — 2580000003 HC RX 258: Performed by: STUDENT IN AN ORGANIZED HEALTH CARE EDUCATION/TRAINING PROGRAM

## 2024-12-29 PROCEDURE — 2500000003 HC RX 250 WO HCPCS: Performed by: INTERNAL MEDICINE

## 2024-12-29 PROCEDURE — C1887 CATHETER, GUIDING: HCPCS | Performed by: INTERNAL MEDICINE

## 2024-12-29 PROCEDURE — 2709999900 HC NON-CHARGEABLE SUPPLY: Performed by: INTERNAL MEDICINE

## 2024-12-29 PROCEDURE — 36415 COLL VENOUS BLD VENIPUNCTURE: CPT

## 2024-12-29 PROCEDURE — 71045 X-RAY EXAM CHEST 1 VIEW: CPT

## 2024-12-29 PROCEDURE — 99152 MOD SED SAME PHYS/QHP 5/>YRS: CPT | Performed by: INTERNAL MEDICINE

## 2024-12-29 PROCEDURE — 6360000002 HC RX W HCPCS: Performed by: INTERNAL MEDICINE

## 2024-12-29 PROCEDURE — 027035Z DILATION OF CORONARY ARTERY, ONE ARTERY WITH TWO DRUG-ELUTING INTRALUMINAL DEVICES, PERCUTANEOUS APPROACH: ICD-10-PCS | Performed by: INTERNAL MEDICINE

## 2024-12-29 PROCEDURE — C1874 STENT, COATED/COV W/DEL SYS: HCPCS | Performed by: INTERNAL MEDICINE

## 2024-12-29 PROCEDURE — C1769 GUIDE WIRE: HCPCS | Performed by: INTERNAL MEDICINE

## 2024-12-29 PROCEDURE — 2580000003 HC RX 258: Performed by: INTERNAL MEDICINE

## 2024-12-29 DEVICE — STENT ONYXNG40015UX ONYX 4.00X15RX
Type: IMPLANTABLE DEVICE | Status: FUNCTIONAL
Brand: ONYX FRONTIER™

## 2024-12-29 DEVICE — STENT ONYXNG35012UX ONYX 3.50X12RX
Type: IMPLANTABLE DEVICE | Status: FUNCTIONAL
Brand: ONYX FRONTIER™

## 2024-12-29 RX ORDER — HEPARIN SODIUM 1000 [USP'U]/ML
INJECTION, SOLUTION INTRAVENOUS; SUBCUTANEOUS PRN
Status: DISCONTINUED | OUTPATIENT
Start: 2024-12-29 | End: 2024-12-29 | Stop reason: HOSPADM

## 2024-12-29 RX ORDER — IOPAMIDOL 612 MG/ML
INJECTION, SOLUTION INTRAVASCULAR PRN
Status: DISCONTINUED | OUTPATIENT
Start: 2024-12-29 | End: 2024-12-29 | Stop reason: HOSPADM

## 2024-12-29 RX ORDER — 0.9 % SODIUM CHLORIDE 0.9 %
1000 INTRAVENOUS SOLUTION INTRAVENOUS ONCE
Status: COMPLETED | OUTPATIENT
Start: 2024-12-29 | End: 2024-12-30

## 2024-12-29 RX ORDER — HEPARIN SODIUM 200 [USP'U]/100ML
INJECTION, SOLUTION INTRAVENOUS CONTINUOUS PRN
Status: COMPLETED | OUTPATIENT
Start: 2024-12-29 | End: 2024-12-29

## 2024-12-29 RX ORDER — LIDOCAINE HYDROCHLORIDE 10 MG/ML
INJECTION, SOLUTION INFILTRATION; PERINEURAL PRN
Status: DISCONTINUED | OUTPATIENT
Start: 2024-12-29 | End: 2024-12-29 | Stop reason: HOSPADM

## 2024-12-29 RX ORDER — HEPARIN SODIUM 5000 [USP'U]/ML
5000 INJECTION, SOLUTION INTRAVENOUS; SUBCUTANEOUS 2 TIMES DAILY
Status: DISCONTINUED | OUTPATIENT
Start: 2024-12-29 | End: 2025-01-09 | Stop reason: HOSPADM

## 2024-12-29 RX ORDER — ATORVASTATIN CALCIUM 40 MG/1
40 TABLET, FILM COATED ORAL NIGHTLY
Status: DISCONTINUED | OUTPATIENT
Start: 2024-12-29 | End: 2025-01-09 | Stop reason: HOSPADM

## 2024-12-29 RX ORDER — ASPIRIN 81 MG/1
81 TABLET ORAL DAILY
Status: DISCONTINUED | OUTPATIENT
Start: 2024-12-30 | End: 2025-01-09 | Stop reason: HOSPADM

## 2024-12-29 RX ADMIN — SODIUM CHLORIDE 1000 ML: 9 INJECTION, SOLUTION INTRAVENOUS at 22:51

## 2024-12-29 ASSESSMENT — PAIN SCALES - GENERAL
PAINLEVEL_OUTOF10: 3
PAINLEVEL_OUTOF10: 4

## 2024-12-29 ASSESSMENT — PAIN - FUNCTIONAL ASSESSMENT: PAIN_FUNCTIONAL_ASSESSMENT: 0-10

## 2024-12-29 ASSESSMENT — PAIN DESCRIPTION - LOCATION: LOCATION: CHEST

## 2024-12-30 ENCOUNTER — APPOINTMENT (OUTPATIENT)
Dept: PRIMARY CARE | Facility: CLINIC | Age: 69
End: 2024-12-30
Payer: MEDICARE

## 2024-12-30 ENCOUNTER — APPOINTMENT (OUTPATIENT)
Dept: ULTRASOUND IMAGING | Age: 69
DRG: 321 | End: 2024-12-30
Payer: MEDICARE

## 2024-12-30 ENCOUNTER — APPOINTMENT (OUTPATIENT)
Age: 69
DRG: 321 | End: 2024-12-30
Attending: INTERNAL MEDICINE
Payer: MEDICARE

## 2024-12-30 LAB
ECHO BSA: 2.03 M2
ECHO LA VOL A-L A2C: 98 ML (ref 22–52)
ECHO LA VOL A-L A4C: 61 ML (ref 22–52)
ECHO LA VOL MOD A2C: 91 ML (ref 22–52)
ECHO LA VOL MOD A4C: 55 ML (ref 22–52)
ECHO LA VOLUME AREA LENGTH: 80 ML
ECHO LA VOLUME INDEX A-L A2C: 50 ML/M2 (ref 16–34)
ECHO LA VOLUME INDEX A-L A4C: 31 ML/M2 (ref 16–34)
ECHO LA VOLUME INDEX AREA LENGTH: 41 ML/M2 (ref 16–34)
ECHO LA VOLUME INDEX MOD A2C: 46 ML/M2 (ref 16–34)
ECHO LA VOLUME INDEX MOD A4C: 28 ML/M2 (ref 16–34)
ECHO LV E' LATERAL VELOCITY: 6.9 CM/S
ECHO LV E' SEPTAL VELOCITY: 7.96 CM/S
ECHO LV EDV A2C: 126 ML
ECHO LV EDV A4C: 123 ML
ECHO LV EDV BP: 125 ML (ref 56–104)
ECHO LV EDV INDEX A4C: 62 ML/M2
ECHO LV EDV INDEX BP: 63 ML/M2
ECHO LV EDV NDEX A2C: 64 ML/M2
ECHO LV EJECTION FRACTION A2C: 70 %
ECHO LV EJECTION FRACTION A4C: 57 %
ECHO LV EJECTION FRACTION BIPLANE: 62 % (ref 55–100)
ECHO LV ESV A2C: 38 ML
ECHO LV ESV A4C: 53 ML
ECHO LV ESV BP: 47 ML (ref 19–49)
ECHO LV ESV INDEX A2C: 19 ML/M2
ECHO LV ESV INDEX A4C: 27 ML/M2
ECHO LV ESV INDEX BP: 24 ML/M2
ECHO LV FRACTIONAL SHORTENING: 28 % (ref 28–44)
ECHO LV INTERNAL DIMENSION DIASTOLE INDEX: 2.34 CM/M2
ECHO LV INTERNAL DIMENSION DIASTOLIC: 4.6 CM (ref 3.9–5.3)
ECHO LV INTERNAL DIMENSION SYSTOLIC INDEX: 1.68 CM/M2
ECHO LV INTERNAL DIMENSION SYSTOLIC: 3.3 CM
ECHO LV IVSD: 1.2 CM (ref 0.6–0.9)
ECHO LV IVSS: 1.6 CM
ECHO LV MASS 2D: 205 G (ref 67–162)
ECHO LV MASS INDEX 2D: 104.1 G/M2 (ref 43–95)
ECHO LV POSTERIOR WALL DIASTOLIC: 1.2 CM (ref 0.6–0.9)
ECHO LV POSTERIOR WALL SYSTOLIC: 1.6 CM
ECHO LV RELATIVE WALL THICKNESS RATIO: 0.52
ECHO MV A VELOCITY: 0.82 M/S
ECHO MV E DECELERATION TIME (DT): 221.2 MS
ECHO MV E VELOCITY: 0.65 M/S
ECHO MV E/A RATIO: 0.79
ECHO MV E/E' LATERAL: 9.42
ECHO MV E/E' RATIO (AVERAGED): 8.79
ECHO MV E/E' SEPTAL: 8.17
ECHO RV INTERNAL DIMENSION: 2.9 CM
ECHO RV TAPSE: 1.9 CM (ref 1.7–?)
ESTIMATED AVERAGE GLUCOSE: 114 MG/DL
GLUCOSE BLD-MCNC: 160 MG/DL (ref 70–99)
HBA1C MFR BLD: 5.6 % (ref 4–6)
PERFORMED ON: ABNORMAL
POC ACT LR: 153 SEC
POC ACT LR: 195 SEC
TROPONIN, HIGH SENSITIVITY: 881 NG/L (ref 0–19)

## 2024-12-30 PROCEDURE — C8924 2D TTE W OR W/O FOL W/CON,FU: HCPCS

## 2024-12-30 PROCEDURE — 2000000000 HC ICU R&B

## 2024-12-30 PROCEDURE — 6360000002 HC RX W HCPCS: Performed by: NURSE PRACTITIONER

## 2024-12-30 PROCEDURE — 83036 HEMOGLOBIN GLYCOSYLATED A1C: CPT

## 2024-12-30 PROCEDURE — 93321 DOPPLER ECHO F-UP/LMTD STD: CPT | Performed by: INTERNAL MEDICINE

## 2024-12-30 PROCEDURE — 99291 CRITICAL CARE FIRST HOUR: CPT | Performed by: INTERNAL MEDICINE

## 2024-12-30 PROCEDURE — 93970 EXTREMITY STUDY: CPT

## 2024-12-30 PROCEDURE — 84484 ASSAY OF TROPONIN QUANT: CPT

## 2024-12-30 PROCEDURE — 85347 COAGULATION TIME ACTIVATED: CPT

## 2024-12-30 PROCEDURE — 2580000003 HC RX 258: Performed by: INTERNAL MEDICINE

## 2024-12-30 PROCEDURE — 6360000002 HC RX W HCPCS: Performed by: INTERNAL MEDICINE

## 2024-12-30 PROCEDURE — 6370000000 HC RX 637 (ALT 250 FOR IP): Performed by: INTERNAL MEDICINE

## 2024-12-30 PROCEDURE — 6360000004 HC RX CONTRAST MEDICATION: Performed by: INTERNAL MEDICINE

## 2024-12-30 PROCEDURE — 93325 DOPPLER ECHO COLOR FLOW MAPG: CPT | Performed by: INTERNAL MEDICINE

## 2024-12-30 PROCEDURE — 6370000000 HC RX 637 (ALT 250 FOR IP): Performed by: NURSE PRACTITIONER

## 2024-12-30 PROCEDURE — 93308 TTE F-UP OR LMTD: CPT | Performed by: INTERNAL MEDICINE

## 2024-12-30 PROCEDURE — 36415 COLL VENOUS BLD VENIPUNCTURE: CPT

## 2024-12-30 RX ORDER — NITROGLYCERIN 0.4 MG/1
0.4 TABLET SUBLINGUAL EVERY 5 MIN PRN
Status: DISCONTINUED | OUTPATIENT
Start: 2024-12-30 | End: 2025-01-09 | Stop reason: HOSPADM

## 2024-12-30 RX ORDER — TRAMADOL HYDROCHLORIDE 50 MG/1
50 TABLET ORAL EVERY 6 HOURS PRN
Status: DISCONTINUED | OUTPATIENT
Start: 2024-12-30 | End: 2025-01-09 | Stop reason: HOSPADM

## 2024-12-30 RX ORDER — LABETALOL HYDROCHLORIDE 5 MG/ML
20 INJECTION, SOLUTION INTRAVENOUS EVERY 4 HOURS PRN
Status: DISCONTINUED | OUTPATIENT
Start: 2024-12-30 | End: 2025-01-09 | Stop reason: HOSPADM

## 2024-12-30 RX ORDER — ONDANSETRON 2 MG/ML
4 INJECTION INTRAMUSCULAR; INTRAVENOUS EVERY 6 HOURS PRN
Status: DISCONTINUED | OUTPATIENT
Start: 2024-12-30 | End: 2025-01-09 | Stop reason: HOSPADM

## 2024-12-30 RX ORDER — HYDRALAZINE HYDROCHLORIDE 20 MG/ML
10 INJECTION INTRAMUSCULAR; INTRAVENOUS EVERY 4 HOURS PRN
Status: DISCONTINUED | OUTPATIENT
Start: 2024-12-30 | End: 2025-01-09 | Stop reason: HOSPADM

## 2024-12-30 RX ORDER — LOSARTAN POTASSIUM 50 MG/1
50 TABLET ORAL DAILY
Status: DISCONTINUED | OUTPATIENT
Start: 2024-12-30 | End: 2025-01-02

## 2024-12-30 RX ORDER — HYDRALAZINE HYDROCHLORIDE 20 MG/ML
10 INJECTION INTRAMUSCULAR; INTRAVENOUS EVERY 10 MIN PRN
Status: DISCONTINUED | OUTPATIENT
Start: 2024-12-30 | End: 2024-12-30

## 2024-12-30 RX ORDER — MORPHINE SULFATE 4 MG/ML
2 INJECTION, SOLUTION INTRAMUSCULAR; INTRAVENOUS
Status: DISCONTINUED | OUTPATIENT
Start: 2024-12-30 | End: 2024-12-30

## 2024-12-30 RX ORDER — PANTOPRAZOLE SODIUM 40 MG/1
40 TABLET, DELAYED RELEASE ORAL DAILY
Status: DISCONTINUED | OUTPATIENT
Start: 2024-12-30 | End: 2025-01-09 | Stop reason: HOSPADM

## 2024-12-30 RX ORDER — METOPROLOL SUCCINATE 50 MG/1
50 TABLET, EXTENDED RELEASE ORAL DAILY
Status: DISCONTINUED | OUTPATIENT
Start: 2024-12-30 | End: 2025-01-09 | Stop reason: HOSPADM

## 2024-12-30 RX ORDER — LABETALOL HYDROCHLORIDE 5 MG/ML
10 INJECTION, SOLUTION INTRAVENOUS ONCE
Status: COMPLETED | OUTPATIENT
Start: 2024-12-30 | End: 2024-12-30

## 2024-12-30 RX ORDER — SODIUM CHLORIDE 9 MG/ML
INJECTION, SOLUTION INTRAVENOUS CONTINUOUS
Status: DISPENSED | OUTPATIENT
Start: 2024-12-30 | End: 2024-12-30

## 2024-12-30 RX ORDER — ACETAMINOPHEN 325 MG/1
650 TABLET ORAL EVERY 4 HOURS PRN
Status: DISCONTINUED | OUTPATIENT
Start: 2024-12-30 | End: 2025-01-09 | Stop reason: HOSPADM

## 2024-12-30 RX ORDER — FAMOTIDINE 20 MG/1
20 TABLET, FILM COATED ORAL 2 TIMES DAILY
Status: DISCONTINUED | OUTPATIENT
Start: 2024-12-30 | End: 2025-01-09 | Stop reason: HOSPADM

## 2024-12-30 RX ORDER — MORPHINE SULFATE 2 MG/ML
2 INJECTION, SOLUTION INTRAMUSCULAR; INTRAVENOUS
Status: DISCONTINUED | OUTPATIENT
Start: 2024-12-30 | End: 2025-01-09 | Stop reason: HOSPADM

## 2024-12-30 RX ORDER — OXCARBAZEPINE 300 MG/1
300 TABLET, FILM COATED ORAL 2 TIMES DAILY
Status: DISCONTINUED | OUTPATIENT
Start: 2024-12-30 | End: 2025-01-09 | Stop reason: HOSPADM

## 2024-12-30 RX ADMIN — OXCARBAZEPINE 300 MG: 300 TABLET, FILM COATED ORAL at 04:48

## 2024-12-30 RX ADMIN — TRAMADOL HYDROCHLORIDE 50 MG: 50 TABLET, COATED ORAL at 23:00

## 2024-12-30 RX ADMIN — FAMOTIDINE 20 MG: 20 TABLET, FILM COATED ORAL at 04:48

## 2024-12-30 RX ADMIN — ATORVASTATIN CALCIUM 40 MG: 80 TABLET, FILM COATED ORAL at 20:35

## 2024-12-30 RX ADMIN — TICAGRELOR 90 MG: 90 TABLET ORAL at 20:35

## 2024-12-30 RX ADMIN — PANTOPRAZOLE SODIUM 40 MG: 40 TABLET, DELAYED RELEASE ORAL at 10:23

## 2024-12-30 RX ADMIN — OXCARBAZEPINE 300 MG: 300 TABLET, FILM COATED ORAL at 10:23

## 2024-12-30 RX ADMIN — ATORVASTATIN CALCIUM 40 MG: 80 TABLET, FILM COATED ORAL at 01:51

## 2024-12-30 RX ADMIN — LOSARTAN POTASSIUM 50 MG: 50 TABLET, FILM COATED ORAL at 10:23

## 2024-12-30 RX ADMIN — ONDANSETRON 4 MG: 2 INJECTION, SOLUTION INTRAMUSCULAR; INTRAVENOUS at 01:41

## 2024-12-30 RX ADMIN — HEPARIN SODIUM 5000 UNITS: 5000 INJECTION INTRAVENOUS; SUBCUTANEOUS at 20:34

## 2024-12-30 RX ADMIN — PERFLUTREN 1.5 ML: 6.52 INJECTION, SUSPENSION INTRAVENOUS at 10:22

## 2024-12-30 RX ADMIN — ACETAMINOPHEN 650 MG: 325 TABLET ORAL at 12:09

## 2024-12-30 RX ADMIN — HEPARIN SODIUM 5000 UNITS: 5000 INJECTION INTRAVENOUS; SUBCUTANEOUS at 10:22

## 2024-12-30 RX ADMIN — FAMOTIDINE 20 MG: 20 TABLET, FILM COATED ORAL at 20:35

## 2024-12-30 RX ADMIN — LABETALOL HYDROCHLORIDE 10 MG: 5 INJECTION, SOLUTION INTRAVENOUS at 08:36

## 2024-12-30 RX ADMIN — ASPIRIN 81 MG: 81 TABLET, COATED ORAL at 10:23

## 2024-12-30 RX ADMIN — TICAGRELOR 90 MG: 90 TABLET ORAL at 10:33

## 2024-12-30 RX ADMIN — METOPROLOL SUCCINATE 50 MG: 50 TABLET, EXTENDED RELEASE ORAL at 10:23

## 2024-12-30 RX ADMIN — OXCARBAZEPINE 300 MG: 300 TABLET, FILM COATED ORAL at 20:35

## 2024-12-30 RX ADMIN — MORPHINE SULFATE 2 MG: 4 INJECTION, SOLUTION INTRAMUSCULAR; INTRAVENOUS at 01:42

## 2024-12-30 RX ADMIN — MORPHINE SULFATE 2 MG: 2 INJECTION, SOLUTION INTRAMUSCULAR; INTRAVENOUS at 09:28

## 2024-12-30 RX ADMIN — FAMOTIDINE 20 MG: 20 TABLET, FILM COATED ORAL at 10:23

## 2024-12-30 RX ADMIN — TRAMADOL HYDROCHLORIDE 50 MG: 50 TABLET, COATED ORAL at 16:52

## 2024-12-30 RX ADMIN — MORPHINE SULFATE 2 MG: 4 INJECTION, SOLUTION INTRAMUSCULAR; INTRAVENOUS at 04:48

## 2024-12-30 RX ADMIN — MORPHINE SULFATE 2 MG: 2 INJECTION, SOLUTION INTRAMUSCULAR; INTRAVENOUS at 14:55

## 2024-12-30 RX ADMIN — SODIUM CHLORIDE: 9 INJECTION, SOLUTION INTRAVENOUS at 01:41

## 2024-12-30 RX ADMIN — LABETALOL HYDROCHLORIDE 20 MG: 5 INJECTION, SOLUTION INTRAVENOUS at 19:23

## 2024-12-30 ASSESSMENT — PAIN DESCRIPTION - ORIENTATION
ORIENTATION: LOWER
ORIENTATION: POSTERIOR
ORIENTATION: ANTERIOR
ORIENTATION: POSTERIOR;ANTERIOR
ORIENTATION: MID;UPPER
ORIENTATION: POSTERIOR
ORIENTATION: ANTERIOR
ORIENTATION: ANTERIOR;POSTERIOR
ORIENTATION: LOWER
ORIENTATION: LOWER
ORIENTATION: MID;UPPER

## 2024-12-30 ASSESSMENT — PAIN DESCRIPTION - PAIN TYPE
TYPE: ACUTE PAIN

## 2024-12-30 ASSESSMENT — PAIN DESCRIPTION - LOCATION
LOCATION: CHEST
LOCATION: CHEST;BACK
LOCATION: BACK
LOCATION: CHEST
LOCATION: BACK
LOCATION: BACK;CHEST
LOCATION: CHEST
LOCATION: BACK
LOCATION: CHEST;BACK
LOCATION: CHEST
LOCATION: BACK

## 2024-12-30 ASSESSMENT — PAIN DESCRIPTION - DESCRIPTORS
DESCRIPTORS: ACHING;DISCOMFORT
DESCRIPTORS: DISCOMFORT
DESCRIPTORS: DISCOMFORT
DESCRIPTORS: ACHING
DESCRIPTORS: DULL;DISCOMFORT
DESCRIPTORS: DISCOMFORT;DULL
DESCRIPTORS: ACHING;SORE
DESCRIPTORS: SORE;ACHING
DESCRIPTORS: DISCOMFORT;DULL
DESCRIPTORS: DULL
DESCRIPTORS: DULL

## 2024-12-30 ASSESSMENT — PAIN DESCRIPTION - FREQUENCY
FREQUENCY: INTERMITTENT

## 2024-12-30 ASSESSMENT — PAIN DESCRIPTION - DIRECTION
RADIATING_TOWARDS: CHEST
RADIATING_TOWARDS: LOWER BACK
RADIATING_TOWARDS: CHEST AND BACK
RADIATING_TOWARDS: CHEST/BACK
RADIATING_TOWARDS: BACK
RADIATING_TOWARDS: BACK

## 2024-12-30 ASSESSMENT — PAIN - FUNCTIONAL ASSESSMENT
PAIN_FUNCTIONAL_ASSESSMENT: PREVENTS OR INTERFERES SOME ACTIVE ACTIVITIES AND ADLS

## 2024-12-30 ASSESSMENT — PAIN DESCRIPTION - ONSET
ONSET: ON-GOING
ONSET: SUDDEN
ONSET: ON-GOING

## 2024-12-30 ASSESSMENT — PAIN SCALES - GENERAL
PAINLEVEL_OUTOF10: 8
PAINLEVEL_OUTOF10: 8
PAINLEVEL_OUTOF10: 0
PAINLEVEL_OUTOF10: 0
PAINLEVEL_OUTOF10: 10
PAINLEVEL_OUTOF10: 9
PAINLEVEL_OUTOF10: 0
PAINLEVEL_OUTOF10: 7
PAINLEVEL_OUTOF10: 4
PAINLEVEL_OUTOF10: 0
PAINLEVEL_OUTOF10: 5
PAINLEVEL_OUTOF10: 9
PAINLEVEL_OUTOF10: 6
PAINLEVEL_OUTOF10: 2
PAINLEVEL_OUTOF10: 10
PAINLEVEL_OUTOF10: 3
PAINLEVEL_OUTOF10: 5

## 2024-12-30 NOTE — CONSULTS
Pulmonary and Critical Care Medicine  Consult Note  Encounter Date: 2024 10:50 AM    Ms. Rekha Russ is a 69 y.o. female  : 1955  Requesting Provider: Bobby Franco DO    Reason for request: ICU management            HISTORY OF PRESENT ILLNESS:    Patient is 69 y.o. presents with witnessed cardiopulmonary arrest, she reported prior symptoms of heartburn and chest tightness, with numbness in her left arm, no dizziness or lightheadedness, no coughing, she did report shortness of breath then syncopal event.  She was at [facility] for rehab when the event occurred.  Underwent left heart cath, with PCI of the proximal LAD and drug-eluting stent x 2        Past Medical History:        Diagnosis Date    Hypertension     Trigeminal neuralgia pain        Past Surgical History:        Procedure Laterality Date    CARDIAC PROCEDURE N/A 2024    Left heart cath / coronary angiography / possible percutaneous coronary intervention. performed by Bobby Franco DO at [facility] CARDIAC CATH LAB    CARDIAC PROCEDURE N/A 2024    Percutaneous coronary intervention performed by Bobby Franco DO at [facility] CARDIAC CATH LAB    HYSTERECTOMY (CERVIX STATUS UNKNOWN)      TONSILLECTOMY         Social History:     reports that she quit smoking about 17 years ago. Her smoking use included cigarettes. She started smoking about 52 years ago. She has a 17.5 pack-year smoking history. She has never used smokeless tobacco. She reports that she does not drink alcohol and does not use drugs.    Family History:   No family history on file.    Allergies:  Bactrim [sulfamethoxazole-trimethoprim] and Seasonal        MEDICATIONS during current hospitalization:    Continuous Infusions:    Scheduled Meds:   famotidine  20 mg Oral BID    metoprolol succinate  50 mg Oral Daily    OXcarbazepine  300 mg Oral BID    pantoprazole  40 mg Oral Daily    losartan  50 mg Oral Daily    aspirin  81 mg Oral Daily    ticagrelor  90 mg

## 2024-12-30 NOTE — BRIEF OP NOTE
Section of Cardiology  Adult Brief Cardiac Cath Procedure Note        Procedure(s):  LHC, b/l coronary angio, PCI of the proximal LAD with drug-eluting stents x 2    Pre-operative Diagnosis: ST elevation MI, cardiopulmonary arrest    H&P Status: Completed and reviewed.     Post-operative Diagnosis:      LV ejection fraction of 50%.  Mild anterior wall hypokinesis  Left main Short, large caliber, normal  LAD large caliber vessel.  Stenosis proximal and distal to the previously placed stents of 70 to 80%.  Patent diagonal 1 branch  Circumflex large caliber vessel.  Mild diffuse disease  RCA large-caliber vessel, mild diffuse disease    Findings:  See full report    Complications:  none    Primary Proceduralist:   Dr.Wes Franco DO  Plan:  Dual antiplatelet therapy  Switch Plavix to Brilinta  Maximize cardiac medications  Check limited 2D echocardiogram  Monitor in ICU      Cardiac rehab nurse will follow-up with the patient and provide further education.  Phase 1 and 2 CR order was initiated and face to order sent outpatient cardiac rehab program, ADLs, smoking cessation, home exercise program, cardiac risk factor modification, heart healthy nutrition and cardiac medication education    Full procedure note to followS

## 2024-12-30 NOTE — INTERDISCIPLINARY ROUNDS
Spiritual Care Services     Summary of Visit:    Attended ICU Rounds. Patient received two stints, resting, no family present, orlin tradition Restorationist    Encounter Summary  Encounter Overview/Reason: Interdisciplinary rounds                               Spiritual Assessment/Intervention/Outcomes:                     Care Plan:               Spiritual Care Services   Electronically signed by Chaplain Tata on 12/30/2024 at 3:57 PM.    To reach a  for emotional and spiritual support, place an EPIC consult request.   If a  is needed immediately, dial “0” and ask to page the on-call .

## 2024-12-30 NOTE — CARE COORDINATION
Case Management Assessment  Initial Evaluation    Date/Time of Evaluation: 12/30/2024 11:23 AM  Assessment Completed by: Rufina Parson    If patient is discharged prior to next notation, then this note serves as note for discharge by case management.    Patient Name: Rekha Russ                   YOB: 1955  Diagnosis: STEMI (ST elevation myocardial infarction) (HCC) [I21.3]  ST elevation myocardial infarction (STEMI), unspecified artery (HCC) [I21.3]  Cardiopulmonary arrest [I46.9]                   Date / Time: 12/29/2024 10:40 PM    Patient Admission Status: Inpatient   Readmission Risk (Low < 19, Mod (19-27), High > 27): Readmission Risk Score: 15.4    Current PCP: Glen Cat MD  PCP verified by CM? Yes    Chart Reviewed: Yes      History Provided by: Patient  Patient Orientation: Alert and Oriented, Person, Place, Situation, Self    Patient Cognition: Alert    Hospitalization in the last 30 days (Readmission):  Yes    If yes, Readmission Assessment in  Navigator will be completed.    Advance Directives:      Code Status: Full Code   Patient's Primary Decision Maker is: Legal Next of Kin    Primary Decision Maker: DEJONSOURAVHERON - Spouse - [phone]    Secondary Decision Maker: PetronaArtem - Child - [phone]    Discharge Planning:    Patient lives with: Spouse/Significant Other Type of Home: House  Primary Care Giver: Self  Patient Support Systems include: Spouse/Significant Other, Children   Current Financial resources:    Current community resources:    Current services prior to admission: None            Current DME:              Type of Home Care services:  None    ADLS  Prior functional level: Independent in ADLs/IADLs  Current functional level: Other (see comment) (Please see PT/OT evals)    PT AM-PAC:   /24  OT AM-PAC:   /24    Family can provide assistance at DC: Yes  Would you like Case Management to discuss the discharge plan with any other

## 2024-12-30 NOTE — ED NOTES
FAITH DIAZ called @ [phone] after recent of 12 lead & report from [facility], per provider's request

## 2024-12-30 NOTE — ED NOTES
Pt is from home, EMS called for \"cardiac issues\"  Upon arrival pt was pulseless. EMS administered CPR for 5 minutes ROSC was achieved.   EMS administered 180 Brilenta, 324 ASA, 4000 units heparin, 1 nitro, 50 of fentanyl, 4 of zofran.

## 2024-12-30 NOTE — CARE COORDINATION
Patient educated for MI on 12/26/24. She had stents to her LAD at that time for NSTEMI. LAD stented again this morning - STEMI. Education deferred. Electronically signed by Joselyn Bear RN on 12/30/2024 at 8:43 AM

## 2024-12-30 NOTE — ACP (ADVANCE CARE PLANNING)
Advance Care Planning   Healthcare Decision Maker:    Primary Decision Maker: JAYRADHAHERON BENJAMIN - Spouse - [phone]    Secondary Decision Maker: PetronaArtem - Child - [phone]    Click here to complete Healthcare Decision Makers including selection of the Healthcare Decision Maker Relationship (ie \"Primary\").          Reviewed with patient and she gave names above as was prior.

## 2024-12-30 NOTE — H&P
Chief Complaint   Patient presents with    Cardiac Arrest            Patient is a 69 y.o. female who presents with a chief complaint of cardiopulmonary arrest. Patient is followed on a regular basis by Glen Gomez MD. patient with past medical history of cortices status post recent PTCA of diagonal 1 at [facility] as well as PCI of the proximal to mid LAD and PTCA of the ostial diagonal 1 restenosis on 2024.  Patient was [facility] rehab and apparently developed a cardiopulmonary arrest with CPR for proxy 5 minutes.  ROSC was reachieved and patient was responsive and alert.  EKG obtained shows ST elevation in anterior leads with reciprocal changes.  Code purple was activated.    Past Medical History:   Diagnosis Date    Hypertension     Trigeminal neuralgia pain       Patient Active Problem List   Diagnosis    Dysfunction of right eustachian tube    Cerumen debris on tympanic membrane of both ears    Acute abdominal pain    Abdominal bloating    Anemia    Chronic kidney disease, stage III (moderate) ([facility])    Constipation    Corn    Gastroesophageal reflux disease without esophagitis    Hypercalcemia    Morbid obesity    Nausea    Prediabetes    Hyperparathyroidism ([facility])    Chest pressure    Arm numbness left    Pulmonary embolism, unspecified chronicity, unspecified pulmonary embolism type, unspecified whether acute cor pulmonale present ([facility])    NSTEMI (non-ST elevated myocardial infarction) ([facility])       Past Surgical History:   Procedure Laterality Date    HYSTERECTOMY (CERVIX STATUS UNKNOWN)      TONSILLECTOMY         Social History     Socioeconomic History    Marital status: Single   Tobacco Use    Smoking status: Former     Current packs/day: 0.00     Average packs/day: 0.5 packs/day for 35.0 years (17.5 ttl pk-yrs)     Types: Cigarettes     Start date:      Quit date:      Years since quittin.0    Smokeless tobacco: Never   Substance and Sexual Activity    Alcohol

## 2024-12-31 ENCOUNTER — TELEPHONE (OUTPATIENT)
Dept: PULMONOLOGY | Age: 69
End: 2024-12-31

## 2024-12-31 DIAGNOSIS — G47.30 SLEEP DISORDER BREATHING: Primary | ICD-10-CM

## 2024-12-31 PROBLEM — I21.3 ST ELEVATION MYOCARDIAL INFARCTION (STEMI) (HCC): Status: ACTIVE | Noted: 2024-12-26

## 2024-12-31 LAB
ANION GAP SERPL CALCULATED.3IONS-SCNC: 11 MEQ/L (ref 9–15)
BUN SERPL-MCNC: 15 MG/DL (ref 8–23)
CALCIUM SERPL-MCNC: 9.8 MG/DL (ref 8.5–9.9)
CHLORIDE SERPL-SCNC: 104 MEQ/L (ref 95–107)
CO2 SERPL-SCNC: 25 MEQ/L (ref 20–31)
CREAT SERPL-MCNC: 0.64 MG/DL (ref 0.5–0.9)
ERYTHROCYTE [DISTWIDTH] IN BLOOD BY AUTOMATED COUNT: 12.7 % (ref 11.5–14.5)
GLUCOSE SERPL-MCNC: 162 MG/DL (ref 70–99)
HCT VFR BLD AUTO: 31.3 % (ref 37–47)
HGB BLD-MCNC: 10.4 G/DL (ref 12–16)
MCH RBC QN AUTO: 31.8 PG (ref 27–31.3)
MCHC RBC AUTO-ENTMCNC: 33.2 % (ref 33–37)
MCV RBC AUTO: 95.7 FL (ref 79.4–94.8)
PLATELET # BLD AUTO: 180 K/UL (ref 130–400)
POTASSIUM SERPL-SCNC: 4.4 MEQ/L (ref 3.4–4.9)
RBC # BLD AUTO: 3.27 M/UL (ref 4.2–5.4)
SODIUM SERPL-SCNC: 140 MEQ/L (ref 135–144)
WBC # BLD AUTO: 8.3 K/UL (ref 4.8–10.8)

## 2024-12-31 PROCEDURE — 97162 PT EVAL MOD COMPLEX 30 MIN: CPT

## 2024-12-31 PROCEDURE — 2700000000 HC OXYGEN THERAPY PER DAY

## 2024-12-31 PROCEDURE — 6370000000 HC RX 637 (ALT 250 FOR IP): Performed by: NURSE PRACTITIONER

## 2024-12-31 PROCEDURE — 85027 COMPLETE CBC AUTOMATED: CPT

## 2024-12-31 PROCEDURE — 6360000002 HC RX W HCPCS: Performed by: NURSE PRACTITIONER

## 2024-12-31 PROCEDURE — 6370000000 HC RX 637 (ALT 250 FOR IP): Performed by: INTERNAL MEDICINE

## 2024-12-31 PROCEDURE — 2060000000 HC ICU INTERMEDIATE R&B

## 2024-12-31 PROCEDURE — APPSS45 APP SPLIT SHARED TIME 31-45 MINUTES

## 2024-12-31 PROCEDURE — 97166 OT EVAL MOD COMPLEX 45 MIN: CPT

## 2024-12-31 PROCEDURE — 6360000002 HC RX W HCPCS: Performed by: INTERNAL MEDICINE

## 2024-12-31 PROCEDURE — 80048 BASIC METABOLIC PNL TOTAL CA: CPT

## 2024-12-31 PROCEDURE — 99232 SBSQ HOSP IP/OBS MODERATE 35: CPT | Performed by: INTERNAL MEDICINE

## 2024-12-31 PROCEDURE — 36415 COLL VENOUS BLD VENIPUNCTURE: CPT

## 2024-12-31 RX ORDER — NITROGLYCERIN 20 MG/100ML
INJECTION INTRAVENOUS CONTINUOUS PRN
Status: DISCONTINUED | OUTPATIENT
Start: 2024-12-29 | End: 2024-12-31 | Stop reason: HOSPADM

## 2024-12-31 RX ADMIN — TICAGRELOR 90 MG: 90 TABLET ORAL at 20:36

## 2024-12-31 RX ADMIN — TICAGRELOR 90 MG: 90 TABLET ORAL at 09:30

## 2024-12-31 RX ADMIN — OXCARBAZEPINE 300 MG: 300 TABLET, FILM COATED ORAL at 20:36

## 2024-12-31 RX ADMIN — HYDRALAZINE HYDROCHLORIDE 10 MG: 20 INJECTION INTRAMUSCULAR; INTRAVENOUS at 02:09

## 2024-12-31 RX ADMIN — ASPIRIN 81 MG: 81 TABLET, COATED ORAL at 09:28

## 2024-12-31 RX ADMIN — HEPARIN SODIUM 5000 UNITS: 5000 INJECTION INTRAVENOUS; SUBCUTANEOUS at 09:27

## 2024-12-31 RX ADMIN — PANTOPRAZOLE SODIUM 40 MG: 40 TABLET, DELAYED RELEASE ORAL at 09:28

## 2024-12-31 RX ADMIN — TRAMADOL HYDROCHLORIDE 50 MG: 50 TABLET, COATED ORAL at 16:53

## 2024-12-31 RX ADMIN — HEPARIN SODIUM 5000 UNITS: 5000 INJECTION INTRAVENOUS; SUBCUTANEOUS at 20:35

## 2024-12-31 RX ADMIN — MORPHINE SULFATE 2 MG: 2 INJECTION, SOLUTION INTRAMUSCULAR; INTRAVENOUS at 03:25

## 2024-12-31 RX ADMIN — METOPROLOL SUCCINATE 50 MG: 50 TABLET, EXTENDED RELEASE ORAL at 09:28

## 2024-12-31 RX ADMIN — OXCARBAZEPINE 300 MG: 300 TABLET, FILM COATED ORAL at 09:28

## 2024-12-31 RX ADMIN — FAMOTIDINE 20 MG: 20 TABLET, FILM COATED ORAL at 09:28

## 2024-12-31 RX ADMIN — FAMOTIDINE 20 MG: 20 TABLET, FILM COATED ORAL at 20:36

## 2024-12-31 RX ADMIN — LABETALOL HYDROCHLORIDE 20 MG: 5 INJECTION, SOLUTION INTRAVENOUS at 03:16

## 2024-12-31 RX ADMIN — LOSARTAN POTASSIUM 50 MG: 50 TABLET, FILM COATED ORAL at 09:28

## 2024-12-31 RX ADMIN — TRAMADOL HYDROCHLORIDE 50 MG: 50 TABLET, COATED ORAL at 09:28

## 2024-12-31 RX ADMIN — ATORVASTATIN CALCIUM 40 MG: 80 TABLET, FILM COATED ORAL at 20:34

## 2024-12-31 ASSESSMENT — PAIN SCALES - GENERAL
PAINLEVEL_OUTOF10: 3
PAINLEVEL_OUTOF10: 7
PAINLEVEL_OUTOF10: 6
PAINLEVEL_OUTOF10: 2
PAINLEVEL_OUTOF10: 8
PAINLEVEL_OUTOF10: 3

## 2024-12-31 ASSESSMENT — PAIN DESCRIPTION - DESCRIPTORS
DESCRIPTORS: ACHING

## 2024-12-31 ASSESSMENT — PAIN DESCRIPTION - LOCATION
LOCATION: CHEST;HIP
LOCATION: BACK;CHEST;HIP
LOCATION: RIB CAGE

## 2024-12-31 ASSESSMENT — PAIN DESCRIPTION - ORIENTATION
ORIENTATION: MID;RIGHT
ORIENTATION: MID

## 2024-12-31 NOTE — CARE COORDINATION
MET WITH PT TO DISCUSS DC PLAN, PT STATES SHE WILL BE HERE FOR A FEW MORE DAYS PER DRS.  DISCUSSED HHC UPON DC, PT WILL DECIDE CLOSER TO DC IF HHC IS NEEDED.

## 2024-12-31 NOTE — CONSULTS
Patient previously consulted by cardiac rehab and given information for scheduling. Electronically signed by Rodríguez Craig on 12/31/2024 at 8:38 AM

## 2024-12-31 NOTE — CARE COORDINATION
S/P PCI, AWAITING PT/OT EVALS FOR DC PLANNING.  PER RN PT C/O BEING VERY WEAK.  PT WAS INDEPENDENT PRIOR TO THIS HOSPITAL STAY.

## 2025-01-01 LAB
ANION GAP SERPL CALCULATED.3IONS-SCNC: 10 MEQ/L (ref 9–15)
BUN SERPL-MCNC: 14 MG/DL (ref 8–23)
CALCIUM SERPL-MCNC: 9.7 MG/DL (ref 8.5–9.9)
CHLORIDE SERPL-SCNC: 104 MEQ/L (ref 95–107)
CO2 SERPL-SCNC: 26 MEQ/L (ref 20–31)
CREAT SERPL-MCNC: 0.7 MG/DL (ref 0.5–0.9)
ERYTHROCYTE [DISTWIDTH] IN BLOOD BY AUTOMATED COUNT: 12.6 % (ref 11.5–14.5)
GLUCOSE SERPL-MCNC: 145 MG/DL (ref 70–99)
HCT VFR BLD AUTO: 29 % (ref 37–47)
HGB BLD-MCNC: 9.9 G/DL (ref 12–16)
MCH RBC QN AUTO: 32.2 PG (ref 27–31.3)
MCHC RBC AUTO-ENTMCNC: 34.1 % (ref 33–37)
MCV RBC AUTO: 94.5 FL (ref 79.4–94.8)
PLATELET # BLD AUTO: 174 K/UL (ref 130–400)
POTASSIUM SERPL-SCNC: 4.1 MEQ/L (ref 3.4–4.9)
RBC # BLD AUTO: 3.07 M/UL (ref 4.2–5.4)
SODIUM SERPL-SCNC: 140 MEQ/L (ref 135–144)
WBC # BLD AUTO: 6.8 K/UL (ref 4.8–10.8)

## 2025-01-01 PROCEDURE — 99232 SBSQ HOSP IP/OBS MODERATE 35: CPT | Performed by: INTERNAL MEDICINE

## 2025-01-01 PROCEDURE — 85027 COMPLETE CBC AUTOMATED: CPT

## 2025-01-01 PROCEDURE — 6360000002 HC RX W HCPCS: Performed by: INTERNAL MEDICINE

## 2025-01-01 PROCEDURE — 2060000000 HC ICU INTERMEDIATE R&B

## 2025-01-01 PROCEDURE — 6370000000 HC RX 637 (ALT 250 FOR IP): Performed by: INTERNAL MEDICINE

## 2025-01-01 PROCEDURE — 36415 COLL VENOUS BLD VENIPUNCTURE: CPT

## 2025-01-01 PROCEDURE — 6370000000 HC RX 637 (ALT 250 FOR IP): Performed by: NURSE PRACTITIONER

## 2025-01-01 PROCEDURE — 80048 BASIC METABOLIC PNL TOTAL CA: CPT

## 2025-01-01 RX ADMIN — TICAGRELOR 90 MG: 90 TABLET ORAL at 09:24

## 2025-01-01 RX ADMIN — ASPIRIN 81 MG: 81 TABLET, COATED ORAL at 09:24

## 2025-01-01 RX ADMIN — TRAMADOL HYDROCHLORIDE 50 MG: 50 TABLET, COATED ORAL at 22:31

## 2025-01-01 RX ADMIN — HEPARIN SODIUM 5000 UNITS: 5000 INJECTION INTRAVENOUS; SUBCUTANEOUS at 09:24

## 2025-01-01 RX ADMIN — METOPROLOL SUCCINATE 50 MG: 50 TABLET, EXTENDED RELEASE ORAL at 09:24

## 2025-01-01 RX ADMIN — OXCARBAZEPINE 300 MG: 300 TABLET, FILM COATED ORAL at 09:26

## 2025-01-01 RX ADMIN — PANTOPRAZOLE SODIUM 40 MG: 40 TABLET, DELAYED RELEASE ORAL at 09:24

## 2025-01-01 RX ADMIN — ATORVASTATIN CALCIUM 40 MG: 80 TABLET, FILM COATED ORAL at 21:18

## 2025-01-01 RX ADMIN — OXCARBAZEPINE 300 MG: 300 TABLET, FILM COATED ORAL at 21:19

## 2025-01-01 RX ADMIN — FAMOTIDINE 20 MG: 20 TABLET, FILM COATED ORAL at 21:18

## 2025-01-01 RX ADMIN — TICAGRELOR 90 MG: 90 TABLET ORAL at 21:19

## 2025-01-01 RX ADMIN — LOSARTAN POTASSIUM 50 MG: 50 TABLET, FILM COATED ORAL at 09:24

## 2025-01-01 RX ADMIN — TRAMADOL HYDROCHLORIDE 50 MG: 50 TABLET, COATED ORAL at 11:28

## 2025-01-01 RX ADMIN — FAMOTIDINE 20 MG: 20 TABLET, FILM COATED ORAL at 09:24

## 2025-01-01 RX ADMIN — HEPARIN SODIUM 5000 UNITS: 5000 INJECTION INTRAVENOUS; SUBCUTANEOUS at 21:19

## 2025-01-01 ASSESSMENT — PAIN SCALES - GENERAL
PAINLEVEL_OUTOF10: 7
PAINLEVEL_OUTOF10: 6
PAINLEVEL_OUTOF10: 3
PAINLEVEL_OUTOF10: 4
PAINLEVEL_OUTOF10: 0

## 2025-01-01 ASSESSMENT — PAIN DESCRIPTION - LOCATION
LOCATION: CHEST
LOCATION: CHEST

## 2025-01-01 ASSESSMENT — PAIN DESCRIPTION - DESCRIPTORS
DESCRIPTORS: DISCOMFORT
DESCRIPTORS: DULL

## 2025-01-01 ASSESSMENT — PAIN DESCRIPTION - ORIENTATION
ORIENTATION: RIGHT;LEFT
ORIENTATION: MID

## 2025-01-01 NOTE — ED PROVIDER NOTES
[facility] 1W TELEMETRY  Emergency Department Encounter  Emergency Medicine      Pt Name: Rekah Russ  MRN:[id number]  Birthdate 1955  Date of evaluation: 1/1/25  Time: 6:30 PM EST  PCP:  Glen Cat MD    CHIEF COMPLAINT       Chief Complaint   Patient presents with    Cardiac Arrest       HISTORY OF PRESENT ILLNESS  (Location/Symptom, Timing/Onset, Context/Setting, Quality, Duration, ModifyingFactors, Severity.)      Rekha Russ is a 69 y.o. female presents from home by EMS with concern for cardiac arrest and STEMI, prehospital EKG showed anterior lateral STEMI  Per report the call was for chest pain they arrived she was in PEA arrest for 5 minutes which they did obtain ROSC and she was breathing on her own comfortably.  She was given Brilinta 180 mg, heparin 4000 units, full-strength aspirin as well as a dose of nitro.  Symptoms had improved, they placed her on a nonrebreather prophylactically     And route they had called back and initially they had said she arrested a second time PEA arrest for 5 minutes but they had obtained ROSC     On arrival here they said she had only lost pulses with the initial time at home, she is maintained being AO x 4  They tell us on arrival here that she was recently in the hospital and discharged couple days ago and had went to the Cath Lab and received stents at that time for chest pain    PAST MEDICAL / SURGICAL / SOCIAL /FAMILY HISTORY      has a past medical history of Hypertension and Trigeminal neuralgia pain.  No other pertinent PMH on review with patient/guardian.     has a past surgical history that includes Hysterectomy; Tonsillectomy; Cardiac procedure (N/A, 12/26/2024); and Cardiac procedure (N/A, 12/26/2024).  No other pertinent PSH on review with patient/guardian.  Social History     Socioeconomic History    Marital status: Single     Spouse name: Not on file    Number of children: Not on file    Years of education: Not on file    Highest

## 2025-01-02 LAB
ANION GAP SERPL CALCULATED.3IONS-SCNC: 10 MEQ/L (ref 9–15)
BUN SERPL-MCNC: 14 MG/DL (ref 8–23)
CALCIUM SERPL-MCNC: 9.2 MG/DL (ref 8.5–9.9)
CHLORIDE SERPL-SCNC: 104 MEQ/L (ref 95–107)
CO2 SERPL-SCNC: 24 MEQ/L (ref 20–31)
CREAT SERPL-MCNC: 0.58 MG/DL (ref 0.5–0.9)
ERYTHROCYTE [DISTWIDTH] IN BLOOD BY AUTOMATED COUNT: 12.4 % (ref 11.5–14.5)
GLUCOSE SERPL-MCNC: 154 MG/DL (ref 70–99)
HCT VFR BLD AUTO: 29.8 % (ref 37–47)
HGB BLD-MCNC: 9.9 G/DL (ref 12–16)
MCH RBC QN AUTO: 30.8 PG (ref 27–31.3)
MCHC RBC AUTO-ENTMCNC: 33.2 % (ref 33–37)
MCV RBC AUTO: 92.8 FL (ref 79.4–94.8)
PLATELET # BLD AUTO: 187 K/UL (ref 130–400)
POTASSIUM SERPL-SCNC: 4.1 MEQ/L (ref 3.4–4.9)
RBC # BLD AUTO: 3.21 M/UL (ref 4.2–5.4)
SODIUM SERPL-SCNC: 138 MEQ/L (ref 135–144)
WBC # BLD AUTO: 6.7 K/UL (ref 4.8–10.8)

## 2025-01-02 PROCEDURE — 99232 SBSQ HOSP IP/OBS MODERATE 35: CPT | Performed by: INTERNAL MEDICINE

## 2025-01-02 PROCEDURE — 6360000002 HC RX W HCPCS: Performed by: NURSE PRACTITIONER

## 2025-01-02 PROCEDURE — 6370000000 HC RX 637 (ALT 250 FOR IP): Performed by: INTERNAL MEDICINE

## 2025-01-02 PROCEDURE — 2060000000 HC ICU INTERMEDIATE R&B

## 2025-01-02 PROCEDURE — APPSS30 APP SPLIT SHARED TIME 16-30 MINUTES

## 2025-01-02 PROCEDURE — 36415 COLL VENOUS BLD VENIPUNCTURE: CPT

## 2025-01-02 PROCEDURE — 6370000000 HC RX 637 (ALT 250 FOR IP): Performed by: NURSE PRACTITIONER

## 2025-01-02 PROCEDURE — 6360000002 HC RX W HCPCS: Performed by: INTERNAL MEDICINE

## 2025-01-02 PROCEDURE — 97535 SELF CARE MNGMENT TRAINING: CPT

## 2025-01-02 PROCEDURE — 80048 BASIC METABOLIC PNL TOTAL CA: CPT

## 2025-01-02 PROCEDURE — 2700000000 HC OXYGEN THERAPY PER DAY

## 2025-01-02 PROCEDURE — 85027 COMPLETE CBC AUTOMATED: CPT

## 2025-01-02 PROCEDURE — 97116 GAIT TRAINING THERAPY: CPT

## 2025-01-02 PROCEDURE — APPSS45 APP SPLIT SHARED TIME 31-45 MINUTES

## 2025-01-02 RX ORDER — LOSARTAN POTASSIUM 100 MG/1
100 TABLET ORAL DAILY
Status: DISCONTINUED | OUTPATIENT
Start: 2025-01-03 | End: 2025-01-09 | Stop reason: HOSPADM

## 2025-01-02 RX ADMIN — FAMOTIDINE 20 MG: 20 TABLET, FILM COATED ORAL at 23:01

## 2025-01-02 RX ADMIN — TICAGRELOR 90 MG: 90 TABLET ORAL at 08:14

## 2025-01-02 RX ADMIN — HEPARIN SODIUM 5000 UNITS: 5000 INJECTION INTRAVENOUS; SUBCUTANEOUS at 08:14

## 2025-01-02 RX ADMIN — METOPROLOL SUCCINATE 50 MG: 50 TABLET, EXTENDED RELEASE ORAL at 08:15

## 2025-01-02 RX ADMIN — MORPHINE SULFATE 2 MG: 2 INJECTION, SOLUTION INTRAMUSCULAR; INTRAVENOUS at 23:01

## 2025-01-02 RX ADMIN — HYDRALAZINE HYDROCHLORIDE 10 MG: 20 INJECTION INTRAMUSCULAR; INTRAVENOUS at 11:30

## 2025-01-02 RX ADMIN — MORPHINE SULFATE 2 MG: 2 INJECTION, SOLUTION INTRAMUSCULAR; INTRAVENOUS at 08:15

## 2025-01-02 RX ADMIN — FAMOTIDINE 20 MG: 20 TABLET, FILM COATED ORAL at 08:15

## 2025-01-02 RX ADMIN — ONDANSETRON 4 MG: 2 INJECTION, SOLUTION INTRAMUSCULAR; INTRAVENOUS at 14:31

## 2025-01-02 RX ADMIN — LABETALOL HYDROCHLORIDE 20 MG: 5 INJECTION, SOLUTION INTRAVENOUS at 08:13

## 2025-01-02 RX ADMIN — OXCARBAZEPINE 300 MG: 300 TABLET, FILM COATED ORAL at 23:01

## 2025-01-02 RX ADMIN — MORPHINE SULFATE 2 MG: 2 INJECTION, SOLUTION INTRAMUSCULAR; INTRAVENOUS at 12:15

## 2025-01-02 RX ADMIN — TRAMADOL HYDROCHLORIDE 50 MG: 50 TABLET, COATED ORAL at 23:01

## 2025-01-02 RX ADMIN — TRAMADOL HYDROCHLORIDE 50 MG: 50 TABLET, COATED ORAL at 12:11

## 2025-01-02 RX ADMIN — HEPARIN SODIUM 5000 UNITS: 5000 INJECTION INTRAVENOUS; SUBCUTANEOUS at 23:14

## 2025-01-02 RX ADMIN — LOSARTAN POTASSIUM 50 MG: 50 TABLET, FILM COATED ORAL at 08:15

## 2025-01-02 RX ADMIN — OXCARBAZEPINE 300 MG: 300 TABLET, FILM COATED ORAL at 08:14

## 2025-01-02 RX ADMIN — HYDRALAZINE HYDROCHLORIDE 10 MG: 20 INJECTION INTRAMUSCULAR; INTRAVENOUS at 15:36

## 2025-01-02 RX ADMIN — ATORVASTATIN CALCIUM 40 MG: 80 TABLET, FILM COATED ORAL at 23:01

## 2025-01-02 RX ADMIN — TICAGRELOR 90 MG: 90 TABLET ORAL at 23:01

## 2025-01-02 RX ADMIN — ASPIRIN 81 MG: 81 TABLET, COATED ORAL at 08:14

## 2025-01-02 RX ADMIN — LABETALOL HYDROCHLORIDE 20 MG: 5 INJECTION, SOLUTION INTRAVENOUS at 00:37

## 2025-01-02 RX ADMIN — PANTOPRAZOLE SODIUM 40 MG: 40 TABLET, DELAYED RELEASE ORAL at 08:14

## 2025-01-02 RX ADMIN — MORPHINE SULFATE 2 MG: 2 INJECTION, SOLUTION INTRAMUSCULAR; INTRAVENOUS at 00:27

## 2025-01-02 RX ADMIN — NITROGLYCERIN 0.4 MG: 0.4 TABLET SUBLINGUAL at 00:27

## 2025-01-02 ASSESSMENT — PAIN SCALES - GENERAL
PAINLEVEL_OUTOF10: 8
PAINLEVEL_OUTOF10: 0
PAINLEVEL_OUTOF10: 3
PAINLEVEL_OUTOF10: 9
PAINLEVEL_OUTOF10: 2
PAINLEVEL_OUTOF10: 5
PAINLEVEL_OUTOF10: 8
PAINLEVEL_OUTOF10: 8

## 2025-01-02 ASSESSMENT — PAIN DESCRIPTION - DESCRIPTORS
DESCRIPTORS: ACHING;SHARP
DESCRIPTORS: SHARP;JABBING
DESCRIPTORS: SHARP
DESCRIPTORS: ACHING;SHARP

## 2025-01-02 ASSESSMENT — PAIN DESCRIPTION - LOCATION
LOCATION: STERNUM
LOCATION: STERNUM
LOCATION: RIB CAGE
LOCATION: STERNUM
LOCATION: CHEST

## 2025-01-02 ASSESSMENT — PAIN DESCRIPTION - ORIENTATION
ORIENTATION: LEFT
ORIENTATION: ANTERIOR;POSTERIOR

## 2025-01-02 NOTE — CARE COORDINATION
MAYLINW was notified by cardiologist that patient said she does not feel strong enough to return home. MAYLINW met with patient and her daughter, Artem, at bedside to discuss this. Patient said she feels she will need more therapy before returning home. SNF discussed and she was agreeable. Patient said she would prefer a SNF in [place] closer to where her daughter is. A list of SNFs in the [place] area was provided to patient and daughter from the Medicare website.     1514: Call received from daughter, Artem. She stated [facility] Referral faxed to admissions at [phone].

## 2025-01-03 LAB
EKG ATRIAL RATE: 100 BPM
EKG ATRIAL RATE: 89 BPM
EKG P AXIS: 50 DEGREES
EKG P AXIS: 59 DEGREES
EKG P-R INTERVAL: 212 MS
EKG P-R INTERVAL: 216 MS
EKG Q-T INTERVAL: 356 MS
EKG Q-T INTERVAL: 370 MS
EKG QRS DURATION: 88 MS
EKG QRS DURATION: 92 MS
EKG QTC CALCULATION (BAZETT): 450 MS
EKG QTC CALCULATION (BAZETT): 459 MS
EKG R AXIS: 26 DEGREES
EKG R AXIS: 54 DEGREES
EKG T AXIS: 56 DEGREES
EKG T AXIS: 56 DEGREES
EKG VENTRICULAR RATE: 100 BPM
EKG VENTRICULAR RATE: 89 BPM
GLUCOSE BLD-MCNC: 148 MG/DL (ref 70–99)
GLUCOSE BLD-MCNC: 152 MG/DL (ref 70–99)
GLUCOSE BLD-MCNC: 177 MG/DL (ref 70–99)
GLUCOSE BLD-MCNC: 187 MG/DL (ref 70–99)
PERFORMED ON: ABNORMAL

## 2025-01-03 PROCEDURE — 94150 VITAL CAPACITY TEST: CPT

## 2025-01-03 PROCEDURE — 6370000000 HC RX 637 (ALT 250 FOR IP)

## 2025-01-03 PROCEDURE — 2060000000 HC ICU INTERMEDIATE R&B

## 2025-01-03 PROCEDURE — 6360000002 HC RX W HCPCS: Performed by: NURSE PRACTITIONER

## 2025-01-03 PROCEDURE — 97116 GAIT TRAINING THERAPY: CPT

## 2025-01-03 PROCEDURE — 6370000000 HC RX 637 (ALT 250 FOR IP): Performed by: INTERNAL MEDICINE

## 2025-01-03 PROCEDURE — APPSS45 APP SPLIT SHARED TIME 31-45 MINUTES

## 2025-01-03 PROCEDURE — 99232 SBSQ HOSP IP/OBS MODERATE 35: CPT | Performed by: INTERNAL MEDICINE

## 2025-01-03 PROCEDURE — 97535 SELF CARE MNGMENT TRAINING: CPT

## 2025-01-03 PROCEDURE — 6360000002 HC RX W HCPCS: Performed by: INTERNAL MEDICINE

## 2025-01-03 PROCEDURE — 6370000000 HC RX 637 (ALT 250 FOR IP): Performed by: NURSE PRACTITIONER

## 2025-01-03 RX ADMIN — OXCARBAZEPINE 300 MG: 300 TABLET, FILM COATED ORAL at 21:52

## 2025-01-03 RX ADMIN — HEPARIN SODIUM 5000 UNITS: 5000 INJECTION INTRAVENOUS; SUBCUTANEOUS at 08:44

## 2025-01-03 RX ADMIN — ASPIRIN 81 MG: 81 TABLET, COATED ORAL at 08:43

## 2025-01-03 RX ADMIN — OXCARBAZEPINE 300 MG: 300 TABLET, FILM COATED ORAL at 08:43

## 2025-01-03 RX ADMIN — MORPHINE SULFATE 2 MG: 2 INJECTION, SOLUTION INTRAMUSCULAR; INTRAVENOUS at 08:50

## 2025-01-03 RX ADMIN — HEPARIN SODIUM 5000 UNITS: 5000 INJECTION INTRAVENOUS; SUBCUTANEOUS at 21:52

## 2025-01-03 RX ADMIN — TICAGRELOR 90 MG: 90 TABLET ORAL at 08:43

## 2025-01-03 RX ADMIN — FAMOTIDINE 20 MG: 20 TABLET, FILM COATED ORAL at 21:52

## 2025-01-03 RX ADMIN — LOSARTAN POTASSIUM 100 MG: 100 TABLET, FILM COATED ORAL at 08:43

## 2025-01-03 RX ADMIN — PANTOPRAZOLE SODIUM 40 MG: 40 TABLET, DELAYED RELEASE ORAL at 08:43

## 2025-01-03 RX ADMIN — FAMOTIDINE 20 MG: 20 TABLET, FILM COATED ORAL at 08:43

## 2025-01-03 RX ADMIN — TICAGRELOR 90 MG: 90 TABLET ORAL at 21:52

## 2025-01-03 RX ADMIN — TRAMADOL HYDROCHLORIDE 50 MG: 50 TABLET, COATED ORAL at 08:49

## 2025-01-03 RX ADMIN — ATORVASTATIN CALCIUM 40 MG: 80 TABLET, FILM COATED ORAL at 21:52

## 2025-01-03 RX ADMIN — METOPROLOL SUCCINATE 50 MG: 50 TABLET, EXTENDED RELEASE ORAL at 08:44

## 2025-01-03 ASSESSMENT — PAIN SCALES - GENERAL
PAINLEVEL_OUTOF10: 7
PAINLEVEL_OUTOF10: 0

## 2025-01-03 ASSESSMENT — PAIN DESCRIPTION - LOCATION: LOCATION: CHEST

## 2025-01-03 NOTE — CARE COORDINATION
LSW spoke with admissions at [facility]. She confirmed referral was received. They will review and call back with a decision.     1355: Call back from admissions stating the referral fax was only partially received. Information re-faxed to [phone].     1545: Spoke with admissions again to confirm all of the referral information was received. She said she did receive it and is reviewing it now. She also mentioned her office phone is not working and she can be reached at [phone].

## 2025-01-04 LAB
GLUCOSE BLD-MCNC: 152 MG/DL (ref 70–99)
GLUCOSE BLD-MCNC: 180 MG/DL (ref 70–99)
PERFORMED ON: ABNORMAL
PERFORMED ON: ABNORMAL

## 2025-01-04 PROCEDURE — 99232 SBSQ HOSP IP/OBS MODERATE 35: CPT | Performed by: INTERNAL MEDICINE

## 2025-01-04 PROCEDURE — 2060000000 HC ICU INTERMEDIATE R&B

## 2025-01-04 PROCEDURE — 6360000002 HC RX W HCPCS: Performed by: NURSE PRACTITIONER

## 2025-01-04 PROCEDURE — 6370000000 HC RX 637 (ALT 250 FOR IP)

## 2025-01-04 PROCEDURE — 2700000000 HC OXYGEN THERAPY PER DAY

## 2025-01-04 PROCEDURE — 6360000002 HC RX W HCPCS: Performed by: INTERNAL MEDICINE

## 2025-01-04 PROCEDURE — 6370000000 HC RX 637 (ALT 250 FOR IP): Performed by: NURSE PRACTITIONER

## 2025-01-04 PROCEDURE — 6370000000 HC RX 637 (ALT 250 FOR IP): Performed by: INTERNAL MEDICINE

## 2025-01-04 RX ORDER — POLYETHYLENE GLYCOL 3350 17 G/17G
17 POWDER, FOR SOLUTION ORAL DAILY
Status: DISCONTINUED | OUTPATIENT
Start: 2025-01-04 | End: 2025-01-09 | Stop reason: HOSPADM

## 2025-01-04 RX ORDER — SODIUM PHOSPHATE, DIBASIC AND SODIUM PHOSPHATE, MONOBASIC 7; 19 G/230ML; G/230ML
1 ENEMA RECTAL PRN
Status: DISCONTINUED | OUTPATIENT
Start: 2025-01-04 | End: 2025-01-09 | Stop reason: HOSPADM

## 2025-01-04 RX ADMIN — ASPIRIN 81 MG: 81 TABLET, COATED ORAL at 10:11

## 2025-01-04 RX ADMIN — FAMOTIDINE 20 MG: 20 TABLET, FILM COATED ORAL at 10:11

## 2025-01-04 RX ADMIN — HEPARIN SODIUM 5000 UNITS: 5000 INJECTION INTRAVENOUS; SUBCUTANEOUS at 22:40

## 2025-01-04 RX ADMIN — TICAGRELOR 90 MG: 90 TABLET ORAL at 22:40

## 2025-01-04 RX ADMIN — HEPARIN SODIUM 5000 UNITS: 5000 INJECTION INTRAVENOUS; SUBCUTANEOUS at 07:52

## 2025-01-04 RX ADMIN — LOSARTAN POTASSIUM 100 MG: 100 TABLET, FILM COATED ORAL at 10:11

## 2025-01-04 RX ADMIN — MORPHINE SULFATE 2 MG: 2 INJECTION, SOLUTION INTRAMUSCULAR; INTRAVENOUS at 00:12

## 2025-01-04 RX ADMIN — TRAMADOL HYDROCHLORIDE 50 MG: 50 TABLET, COATED ORAL at 00:11

## 2025-01-04 RX ADMIN — METOPROLOL SUCCINATE 50 MG: 50 TABLET, EXTENDED RELEASE ORAL at 10:11

## 2025-01-04 RX ADMIN — TRAMADOL HYDROCHLORIDE 50 MG: 50 TABLET, COATED ORAL at 22:40

## 2025-01-04 RX ADMIN — ATORVASTATIN CALCIUM 40 MG: 80 TABLET, FILM COATED ORAL at 22:40

## 2025-01-04 RX ADMIN — PANTOPRAZOLE SODIUM 40 MG: 40 TABLET, DELAYED RELEASE ORAL at 10:11

## 2025-01-04 RX ADMIN — OXCARBAZEPINE 300 MG: 300 TABLET, FILM COATED ORAL at 10:11

## 2025-01-04 RX ADMIN — FAMOTIDINE 20 MG: 20 TABLET, FILM COATED ORAL at 22:40

## 2025-01-04 RX ADMIN — TICAGRELOR 90 MG: 90 TABLET ORAL at 10:11

## 2025-01-04 RX ADMIN — ONDANSETRON 4 MG: 2 INJECTION, SOLUTION INTRAMUSCULAR; INTRAVENOUS at 07:52

## 2025-01-04 RX ADMIN — OXCARBAZEPINE 300 MG: 300 TABLET, FILM COATED ORAL at 22:40

## 2025-01-04 ASSESSMENT — PAIN DESCRIPTION - LOCATION
LOCATION: CHEST;GENERALIZED
LOCATION: CHEST
LOCATION: CHEST;GENERALIZED

## 2025-01-04 ASSESSMENT — PAIN DESCRIPTION - DESCRIPTORS: DESCRIPTORS: ACHING;SORE;DISCOMFORT;BURNING

## 2025-01-04 ASSESSMENT — PAIN SCALES - GENERAL
PAINLEVEL_OUTOF10: 0
PAINLEVEL_OUTOF10: 6
PAINLEVEL_OUTOF10: 8
PAINLEVEL_OUTOF10: 8
PAINLEVEL_OUTOF10: 10
PAINLEVEL_OUTOF10: 10

## 2025-01-04 ASSESSMENT — PAIN - FUNCTIONAL ASSESSMENT
PAIN_FUNCTIONAL_ASSESSMENT: ACTIVITIES ARE NOT PREVENTED
PAIN_FUNCTIONAL_ASSESSMENT: ACTIVITIES ARE NOT PREVENTED

## 2025-01-04 ASSESSMENT — PAIN DESCRIPTION - ORIENTATION: ORIENTATION: MID

## 2025-01-05 LAB
ANION GAP SERPL CALCULATED.3IONS-SCNC: 10 MEQ/L (ref 9–15)
BUN SERPL-MCNC: 14 MG/DL (ref 8–23)
CALCIUM SERPL-MCNC: 10.1 MG/DL (ref 8.5–9.9)
CHLORIDE SERPL-SCNC: 103 MEQ/L (ref 95–107)
CO2 SERPL-SCNC: 25 MEQ/L (ref 20–31)
CREAT SERPL-MCNC: 0.63 MG/DL (ref 0.5–0.9)
ERYTHROCYTE [DISTWIDTH] IN BLOOD BY AUTOMATED COUNT: 12.4 % (ref 11.5–14.5)
GLUCOSE SERPL-MCNC: 183 MG/DL (ref 70–99)
HCT VFR BLD AUTO: 30.3 % (ref 37–47)
HGB BLD-MCNC: 10.4 G/DL (ref 12–16)
MCH RBC QN AUTO: 32 PG (ref 27–31.3)
MCHC RBC AUTO-ENTMCNC: 34.3 % (ref 33–37)
MCV RBC AUTO: 93.2 FL (ref 79.4–94.8)
PLATELET # BLD AUTO: 279 K/UL (ref 130–400)
POTASSIUM SERPL-SCNC: 3.9 MEQ/L (ref 3.4–4.9)
RBC # BLD AUTO: 3.25 M/UL (ref 4.2–5.4)
SODIUM SERPL-SCNC: 138 MEQ/L (ref 135–144)
WBC # BLD AUTO: 6 K/UL (ref 4.8–10.8)

## 2025-01-05 PROCEDURE — 6370000000 HC RX 637 (ALT 250 FOR IP): Performed by: INTERNAL MEDICINE

## 2025-01-05 PROCEDURE — 6360000002 HC RX W HCPCS: Performed by: INTERNAL MEDICINE

## 2025-01-05 PROCEDURE — 99232 SBSQ HOSP IP/OBS MODERATE 35: CPT | Performed by: INTERNAL MEDICINE

## 2025-01-05 PROCEDURE — 2060000000 HC ICU INTERMEDIATE R&B

## 2025-01-05 PROCEDURE — 6370000000 HC RX 637 (ALT 250 FOR IP)

## 2025-01-05 PROCEDURE — 85027 COMPLETE CBC AUTOMATED: CPT

## 2025-01-05 PROCEDURE — 80048 BASIC METABOLIC PNL TOTAL CA: CPT

## 2025-01-05 PROCEDURE — 6370000000 HC RX 637 (ALT 250 FOR IP): Performed by: NURSE PRACTITIONER

## 2025-01-05 PROCEDURE — 36415 COLL VENOUS BLD VENIPUNCTURE: CPT

## 2025-01-05 RX ADMIN — POLYETHYLENE GLYCOL 3350 17 G: 17 POWDER, FOR SOLUTION ORAL at 09:53

## 2025-01-05 RX ADMIN — ASPIRIN 81 MG: 81 TABLET, COATED ORAL at 09:53

## 2025-01-05 RX ADMIN — METOPROLOL SUCCINATE 50 MG: 50 TABLET, EXTENDED RELEASE ORAL at 09:53

## 2025-01-05 RX ADMIN — OXCARBAZEPINE 300 MG: 300 TABLET, FILM COATED ORAL at 09:53

## 2025-01-05 RX ADMIN — ATORVASTATIN CALCIUM 40 MG: 80 TABLET, FILM COATED ORAL at 21:54

## 2025-01-05 RX ADMIN — TRAMADOL HYDROCHLORIDE 50 MG: 50 TABLET, COATED ORAL at 21:54

## 2025-01-05 RX ADMIN — TICAGRELOR 90 MG: 90 TABLET ORAL at 09:53

## 2025-01-05 RX ADMIN — TICAGRELOR 90 MG: 90 TABLET ORAL at 21:54

## 2025-01-05 RX ADMIN — PANTOPRAZOLE SODIUM 40 MG: 40 TABLET, DELAYED RELEASE ORAL at 09:53

## 2025-01-05 RX ADMIN — HEPARIN SODIUM 5000 UNITS: 5000 INJECTION INTRAVENOUS; SUBCUTANEOUS at 09:53

## 2025-01-05 RX ADMIN — FAMOTIDINE 20 MG: 20 TABLET, FILM COATED ORAL at 21:54

## 2025-01-05 RX ADMIN — OXCARBAZEPINE 300 MG: 300 TABLET, FILM COATED ORAL at 21:54

## 2025-01-05 RX ADMIN — HEPARIN SODIUM 5000 UNITS: 5000 INJECTION INTRAVENOUS; SUBCUTANEOUS at 22:00

## 2025-01-05 RX ADMIN — FAMOTIDINE 20 MG: 20 TABLET, FILM COATED ORAL at 09:53

## 2025-01-05 RX ADMIN — TRAMADOL HYDROCHLORIDE 50 MG: 50 TABLET, COATED ORAL at 10:03

## 2025-01-05 RX ADMIN — LOSARTAN POTASSIUM 100 MG: 100 TABLET, FILM COATED ORAL at 09:53

## 2025-01-05 ASSESSMENT — PAIN DESCRIPTION - LOCATION
LOCATION: BACK;CHEST
LOCATION: BACK;CHEST

## 2025-01-05 ASSESSMENT — PAIN SCALES - GENERAL
PAINLEVEL_OUTOF10: 2
PAINLEVEL_OUTOF10: 0
PAINLEVEL_OUTOF10: 7
PAINLEVEL_OUTOF10: 6

## 2025-01-05 ASSESSMENT — PAIN DESCRIPTION - DESCRIPTORS
DESCRIPTORS: ACHING;SORE
DESCRIPTORS: SQUEEZING

## 2025-01-05 ASSESSMENT — PAIN DESCRIPTION - ORIENTATION
ORIENTATION: MID
ORIENTATION: LOWER;MID

## 2025-01-05 NOTE — CARE COORDINATION
LSW met with patient to review therapy notes and discharge plan. Patient stated she still does not feel safe to return home. Voicemail left for [facility] admissions to check if they were able to accept patient and if pre cert had been started. Awaiting a call back.     1515: Call back from admissions at [facility] stating they are able to accept patient and will start pre cert. Updated notes faxed to [phone].

## 2025-01-06 PROCEDURE — 97116 GAIT TRAINING THERAPY: CPT

## 2025-01-06 PROCEDURE — 97535 SELF CARE MNGMENT TRAINING: CPT

## 2025-01-06 PROCEDURE — 6360000002 HC RX W HCPCS: Performed by: NURSE PRACTITIONER

## 2025-01-06 PROCEDURE — 6370000000 HC RX 637 (ALT 250 FOR IP): Performed by: INTERNAL MEDICINE

## 2025-01-06 PROCEDURE — 6360000002 HC RX W HCPCS: Performed by: INTERNAL MEDICINE

## 2025-01-06 PROCEDURE — 6370000000 HC RX 637 (ALT 250 FOR IP): Performed by: NURSE PRACTITIONER

## 2025-01-06 PROCEDURE — 6370000000 HC RX 637 (ALT 250 FOR IP)

## 2025-01-06 PROCEDURE — 99232 SBSQ HOSP IP/OBS MODERATE 35: CPT | Performed by: INTERNAL MEDICINE

## 2025-01-06 PROCEDURE — APPSS30 APP SPLIT SHARED TIME 16-30 MINUTES

## 2025-01-06 PROCEDURE — 2060000000 HC ICU INTERMEDIATE R&B

## 2025-01-06 PROCEDURE — 2700000000 HC OXYGEN THERAPY PER DAY

## 2025-01-06 RX ADMIN — ATORVASTATIN CALCIUM 40 MG: 80 TABLET, FILM COATED ORAL at 21:11

## 2025-01-06 RX ADMIN — HYDRALAZINE HYDROCHLORIDE 10 MG: 20 INJECTION INTRAMUSCULAR; INTRAVENOUS at 23:14

## 2025-01-06 RX ADMIN — TICAGRELOR 90 MG: 90 TABLET ORAL at 09:00

## 2025-01-06 RX ADMIN — LOSARTAN POTASSIUM 100 MG: 100 TABLET, FILM COATED ORAL at 08:59

## 2025-01-06 RX ADMIN — PANTOPRAZOLE SODIUM 40 MG: 40 TABLET, DELAYED RELEASE ORAL at 08:59

## 2025-01-06 RX ADMIN — OXCARBAZEPINE 300 MG: 300 TABLET, FILM COATED ORAL at 22:13

## 2025-01-06 RX ADMIN — TRAMADOL HYDROCHLORIDE 50 MG: 50 TABLET, COATED ORAL at 21:10

## 2025-01-06 RX ADMIN — HEPARIN SODIUM 5000 UNITS: 5000 INJECTION INTRAVENOUS; SUBCUTANEOUS at 21:10

## 2025-01-06 RX ADMIN — FAMOTIDINE 20 MG: 20 TABLET, FILM COATED ORAL at 21:11

## 2025-01-06 RX ADMIN — OXCARBAZEPINE 300 MG: 300 TABLET, FILM COATED ORAL at 08:59

## 2025-01-06 RX ADMIN — FAMOTIDINE 20 MG: 20 TABLET, FILM COATED ORAL at 08:59

## 2025-01-06 RX ADMIN — TICAGRELOR 90 MG: 90 TABLET ORAL at 21:10

## 2025-01-06 RX ADMIN — TRAMADOL HYDROCHLORIDE 50 MG: 50 TABLET, COATED ORAL at 10:54

## 2025-01-06 RX ADMIN — POLYETHYLENE GLYCOL 3350 17 G: 17 POWDER, FOR SOLUTION ORAL at 09:00

## 2025-01-06 RX ADMIN — HEPARIN SODIUM 5000 UNITS: 5000 INJECTION INTRAVENOUS; SUBCUTANEOUS at 09:00

## 2025-01-06 RX ADMIN — METOPROLOL SUCCINATE 50 MG: 50 TABLET, EXTENDED RELEASE ORAL at 09:01

## 2025-01-06 RX ADMIN — ASPIRIN 81 MG: 81 TABLET, COATED ORAL at 08:59

## 2025-01-06 ASSESSMENT — PAIN SCALES - GENERAL
PAINLEVEL_OUTOF10: 5
PAINLEVEL_OUTOF10: 2
PAINLEVEL_OUTOF10: 1
PAINLEVEL_OUTOF10: 4

## 2025-01-06 ASSESSMENT — PAIN DESCRIPTION - ORIENTATION
ORIENTATION: RIGHT;LEFT;MID
ORIENTATION: RIGHT;LEFT;MID
ORIENTATION: RIGHT

## 2025-01-06 ASSESSMENT — PAIN DESCRIPTION - DESCRIPTORS
DESCRIPTORS: ACHING;SORE
DESCRIPTORS: ACHING;SORE
DESCRIPTORS: ACHING
DESCRIPTORS: ACHING;SORE

## 2025-01-06 ASSESSMENT — PAIN DESCRIPTION - LOCATION
LOCATION: BACK;CHEST
LOCATION: BACK
LOCATION: CHEST;BACK
LOCATION: CHEST;BACK

## 2025-01-06 ASSESSMENT — PAIN SCALES - WONG BAKER: WONGBAKER_NUMERICALRESPONSE: NO HURT

## 2025-01-06 NOTE — CARE COORDINATION
Voicemail left for [facility] to confirm pre cert had been started. Updated therapy note faxed.     1348: Call back received from [facility] confirming pre cert was started. Patient's daughter updated.

## 2025-01-07 PROCEDURE — 6360000002 HC RX W HCPCS: Performed by: NURSE PRACTITIONER

## 2025-01-07 PROCEDURE — 99232 SBSQ HOSP IP/OBS MODERATE 35: CPT | Performed by: INTERNAL MEDICINE

## 2025-01-07 PROCEDURE — 2700000000 HC OXYGEN THERAPY PER DAY

## 2025-01-07 PROCEDURE — 97116 GAIT TRAINING THERAPY: CPT

## 2025-01-07 PROCEDURE — 2060000000 HC ICU INTERMEDIATE R&B

## 2025-01-07 PROCEDURE — 6370000000 HC RX 637 (ALT 250 FOR IP): Performed by: INTERNAL MEDICINE

## 2025-01-07 PROCEDURE — 6370000000 HC RX 637 (ALT 250 FOR IP)

## 2025-01-07 PROCEDURE — 6360000002 HC RX W HCPCS: Performed by: INTERNAL MEDICINE

## 2025-01-07 PROCEDURE — 6370000000 HC RX 637 (ALT 250 FOR IP): Performed by: NURSE PRACTITIONER

## 2025-01-07 RX ADMIN — ASPIRIN 81 MG: 81 TABLET, COATED ORAL at 08:33

## 2025-01-07 RX ADMIN — OXCARBAZEPINE 300 MG: 300 TABLET, FILM COATED ORAL at 08:33

## 2025-01-07 RX ADMIN — LOSARTAN POTASSIUM 100 MG: 100 TABLET, FILM COATED ORAL at 08:33

## 2025-01-07 RX ADMIN — TICAGRELOR 90 MG: 90 TABLET ORAL at 20:45

## 2025-01-07 RX ADMIN — ATORVASTATIN CALCIUM 40 MG: 80 TABLET, FILM COATED ORAL at 20:45

## 2025-01-07 RX ADMIN — FAMOTIDINE 20 MG: 20 TABLET, FILM COATED ORAL at 08:33

## 2025-01-07 RX ADMIN — HYDRALAZINE HYDROCHLORIDE 10 MG: 20 INJECTION INTRAMUSCULAR; INTRAVENOUS at 08:33

## 2025-01-07 RX ADMIN — TRAMADOL HYDROCHLORIDE 50 MG: 50 TABLET, COATED ORAL at 16:57

## 2025-01-07 RX ADMIN — HYDRALAZINE HYDROCHLORIDE 10 MG: 20 INJECTION INTRAMUSCULAR; INTRAVENOUS at 03:33

## 2025-01-07 RX ADMIN — TICAGRELOR 90 MG: 90 TABLET ORAL at 08:33

## 2025-01-07 RX ADMIN — OXCARBAZEPINE 300 MG: 300 TABLET, FILM COATED ORAL at 20:45

## 2025-01-07 RX ADMIN — FAMOTIDINE 20 MG: 20 TABLET, FILM COATED ORAL at 20:45

## 2025-01-07 RX ADMIN — METOPROLOL SUCCINATE 50 MG: 50 TABLET, EXTENDED RELEASE ORAL at 08:33

## 2025-01-07 RX ADMIN — HEPARIN SODIUM 5000 UNITS: 5000 INJECTION INTRAVENOUS; SUBCUTANEOUS at 20:46

## 2025-01-07 RX ADMIN — ONDANSETRON 4 MG: 2 INJECTION, SOLUTION INTRAMUSCULAR; INTRAVENOUS at 05:46

## 2025-01-07 RX ADMIN — HEPARIN SODIUM 5000 UNITS: 5000 INJECTION INTRAVENOUS; SUBCUTANEOUS at 08:33

## 2025-01-07 RX ADMIN — PANTOPRAZOLE SODIUM 40 MG: 40 TABLET, DELAYED RELEASE ORAL at 08:33

## 2025-01-07 ASSESSMENT — PAIN SCALES - GENERAL
PAINLEVEL_OUTOF10: 4
PAINLEVEL_OUTOF10: 0
PAINLEVEL_OUTOF10: 4

## 2025-01-07 ASSESSMENT — PAIN DESCRIPTION - DESCRIPTORS
DESCRIPTORS: ACHING;SORE
DESCRIPTORS: ACHING;DISCOMFORT

## 2025-01-07 ASSESSMENT — PAIN DESCRIPTION - LOCATION
LOCATION: CHEST
LOCATION: GENERALIZED

## 2025-01-07 NOTE — CARE COORDINATION
Plan remains [facility]. [facility] portal shows pre cert is still pending at this time.

## 2025-01-08 PROCEDURE — 2060000000 HC ICU INTERMEDIATE R&B

## 2025-01-08 PROCEDURE — 97530 THERAPEUTIC ACTIVITIES: CPT

## 2025-01-08 PROCEDURE — 99232 SBSQ HOSP IP/OBS MODERATE 35: CPT | Performed by: INTERNAL MEDICINE

## 2025-01-08 PROCEDURE — 97116 GAIT TRAINING THERAPY: CPT

## 2025-01-08 PROCEDURE — 2700000000 HC OXYGEN THERAPY PER DAY

## 2025-01-08 PROCEDURE — 6370000000 HC RX 637 (ALT 250 FOR IP)

## 2025-01-08 PROCEDURE — 6370000000 HC RX 637 (ALT 250 FOR IP): Performed by: NURSE PRACTITIONER

## 2025-01-08 PROCEDURE — 6370000000 HC RX 637 (ALT 250 FOR IP): Performed by: INTERNAL MEDICINE

## 2025-01-08 PROCEDURE — 6360000002 HC RX W HCPCS: Performed by: INTERNAL MEDICINE

## 2025-01-08 RX ADMIN — TRAMADOL HYDROCHLORIDE 50 MG: 50 TABLET, COATED ORAL at 08:46

## 2025-01-08 RX ADMIN — METOPROLOL SUCCINATE 50 MG: 50 TABLET, EXTENDED RELEASE ORAL at 08:46

## 2025-01-08 RX ADMIN — FAMOTIDINE 20 MG: 20 TABLET, FILM COATED ORAL at 08:46

## 2025-01-08 RX ADMIN — TRAMADOL HYDROCHLORIDE 50 MG: 50 TABLET, COATED ORAL at 21:58

## 2025-01-08 RX ADMIN — FAMOTIDINE 20 MG: 20 TABLET, FILM COATED ORAL at 21:54

## 2025-01-08 RX ADMIN — OXCARBAZEPINE 300 MG: 300 TABLET, FILM COATED ORAL at 08:45

## 2025-01-08 RX ADMIN — TICAGRELOR 90 MG: 90 TABLET ORAL at 21:54

## 2025-01-08 RX ADMIN — ASPIRIN 81 MG: 81 TABLET, COATED ORAL at 08:45

## 2025-01-08 RX ADMIN — HEPARIN SODIUM 5000 UNITS: 5000 INJECTION INTRAVENOUS; SUBCUTANEOUS at 08:50

## 2025-01-08 RX ADMIN — HEPARIN SODIUM 5000 UNITS: 5000 INJECTION INTRAVENOUS; SUBCUTANEOUS at 21:53

## 2025-01-08 RX ADMIN — TRAMADOL HYDROCHLORIDE 50 MG: 50 TABLET, COATED ORAL at 16:26

## 2025-01-08 RX ADMIN — ATORVASTATIN CALCIUM 40 MG: 80 TABLET, FILM COATED ORAL at 21:54

## 2025-01-08 RX ADMIN — PANTOPRAZOLE SODIUM 40 MG: 40 TABLET, DELAYED RELEASE ORAL at 08:46

## 2025-01-08 RX ADMIN — LOSARTAN POTASSIUM 100 MG: 100 TABLET, FILM COATED ORAL at 08:45

## 2025-01-08 RX ADMIN — TICAGRELOR 90 MG: 90 TABLET ORAL at 08:46

## 2025-01-08 RX ADMIN — OXCARBAZEPINE 300 MG: 300 TABLET, FILM COATED ORAL at 21:54

## 2025-01-08 ASSESSMENT — PAIN SCALES - GENERAL
PAINLEVEL_OUTOF10: 6
PAINLEVEL_OUTOF10: 5
PAINLEVEL_OUTOF10: 6
PAINLEVEL_OUTOF10: 3
PAINLEVEL_OUTOF10: 6

## 2025-01-08 ASSESSMENT — PAIN DESCRIPTION - DESCRIPTORS
DESCRIPTORS: DISCOMFORT;ACHING
DESCRIPTORS: ACHING

## 2025-01-08 ASSESSMENT — PAIN DESCRIPTION - ORIENTATION
ORIENTATION: RIGHT;LEFT
ORIENTATION: LEFT

## 2025-01-08 ASSESSMENT — PAIN DESCRIPTION - LOCATION
LOCATION: SHOULDER
LOCATION: SHOULDER
LOCATION: ARM

## 2025-01-08 NOTE — CARE COORDINATION
DENIAL RECEIVED BY STEVEN AT [facility] PER INSURANCE WILL NEED P2P BY 9AM. [phone] OPT 5. MESSAGE SENT TO MD

## 2025-01-08 NOTE — CARE COORDINATION
CALL TO ADMISSIONS NUMBER IN CHART AND LEFT A VM. CALL TO FACILITY AND WAS DIRECTED TO A VM, ALSO LEFT VM WITH FACILITY.

## 2025-01-08 NOTE — CARE COORDINATION
SPOKE WITH PATIENT, AWARE OF DENIAL. STATES DAUGHTER CAN GET HER IN THE MORNING IF APPEAL NOT APPROVED. AGREEABLE TO [facility]. [facility] OFFERED AND CHOSE [facility]. REFERRAL GIVEN AND WILL AWAIT CALL BACK FROM [place].

## 2025-01-09 ENCOUNTER — TELEPHONE (OUTPATIENT)
Dept: PRIMARY CARE | Facility: CLINIC | Age: 70
End: 2025-01-09
Payer: MEDICARE

## 2025-01-09 VITALS
RESPIRATION RATE: 18 BRPM | OXYGEN SATURATION: 95 % | HEART RATE: 84 BPM | HEIGHT: 65 IN | WEIGHT: 186 LBS | TEMPERATURE: 98.2 F | DIASTOLIC BLOOD PRESSURE: 107 MMHG | SYSTOLIC BLOOD PRESSURE: 133 MMHG | BODY MASS INDEX: 30.99 KG/M2

## 2025-01-09 PROCEDURE — 6360000002 HC RX W HCPCS: Performed by: INTERNAL MEDICINE

## 2025-01-09 PROCEDURE — 99232 SBSQ HOSP IP/OBS MODERATE 35: CPT | Performed by: INTERNAL MEDICINE

## 2025-01-09 PROCEDURE — 6370000000 HC RX 637 (ALT 250 FOR IP): Performed by: NURSE PRACTITIONER

## 2025-01-09 PROCEDURE — 6370000000 HC RX 637 (ALT 250 FOR IP): Performed by: INTERNAL MEDICINE

## 2025-01-09 PROCEDURE — 99239 HOSP IP/OBS DSCHRG MGMT >30: CPT | Performed by: INTERNAL MEDICINE

## 2025-01-09 PROCEDURE — 6370000000 HC RX 637 (ALT 250 FOR IP)

## 2025-01-09 RX ORDER — NITROGLYCERIN 0.4 MG/1
0.4 TABLET SUBLINGUAL EVERY 5 MIN PRN
Qty: 25 TABLET | Refills: 3 | Status: SHIPPED | OUTPATIENT
Start: 2025-01-09 | End: 2025-02-08

## 2025-01-09 RX ORDER — LOSARTAN POTASSIUM 50 MG/1
50 TABLET ORAL DAILY
Qty: 30 TABLET | Refills: 3 | Status: SHIPPED | OUTPATIENT
Start: 2025-01-09

## 2025-01-09 RX ORDER — ASPIRIN 81 MG/1
81 TABLET ORAL DAILY
Qty: 30 TABLET | Refills: 3 | Status: SHIPPED | OUTPATIENT
Start: 2025-01-09

## 2025-01-09 RX ORDER — METOPROLOL SUCCINATE 50 MG/1
50 TABLET, EXTENDED RELEASE ORAL DAILY
Qty: 30 TABLET | Refills: 3 | Status: SHIPPED | OUTPATIENT
Start: 2025-01-09

## 2025-01-09 RX ADMIN — POLYETHYLENE GLYCOL 3350 17 G: 17 POWDER, FOR SOLUTION ORAL at 09:13

## 2025-01-09 RX ADMIN — TRAMADOL HYDROCHLORIDE 50 MG: 50 TABLET, COATED ORAL at 05:04

## 2025-01-09 RX ADMIN — LOSARTAN POTASSIUM 100 MG: 100 TABLET, FILM COATED ORAL at 09:13

## 2025-01-09 RX ADMIN — METOPROLOL SUCCINATE 50 MG: 50 TABLET, EXTENDED RELEASE ORAL at 09:12

## 2025-01-09 RX ADMIN — TICAGRELOR 90 MG: 90 TABLET ORAL at 09:13

## 2025-01-09 RX ADMIN — FAMOTIDINE 20 MG: 20 TABLET, FILM COATED ORAL at 09:13

## 2025-01-09 RX ADMIN — ASPIRIN 81 MG: 81 TABLET, COATED ORAL at 09:13

## 2025-01-09 RX ADMIN — HEPARIN SODIUM 5000 UNITS: 5000 INJECTION INTRAVENOUS; SUBCUTANEOUS at 09:13

## 2025-01-09 RX ADMIN — OXCARBAZEPINE 300 MG: 300 TABLET, FILM COATED ORAL at 09:13

## 2025-01-09 RX ADMIN — PANTOPRAZOLE SODIUM 40 MG: 40 TABLET, DELAYED RELEASE ORAL at 09:12

## 2025-01-09 ASSESSMENT — PAIN SCALES - GENERAL
PAINLEVEL_OUTOF10: 6
PAINLEVEL_OUTOF10: 5
PAINLEVEL_OUTOF10: 6

## 2025-01-09 ASSESSMENT — PAIN DESCRIPTION - LOCATION: LOCATION: ARM;CHEST

## 2025-01-09 ASSESSMENT — PAIN DESCRIPTION - ORIENTATION: ORIENTATION: LEFT

## 2025-01-09 NOTE — DISCHARGE SUMMARY
tablet  Take 1 tablet by mouth daily    metoprolol succinate (TOPROL XL) 50 MG extended release tablet  Take 1 tablet by mouth daily  Qty: 30 tablet Refills: 3    famotidine (PEPCID) 20 MG tablet  Take 1 tablet by mouth 2 times daily  Qty: 60 tablet Refills: 0    atorvastatin (LIPITOR) 40 MG tablet  Take 1 tablet by mouth nightly    clopidogrel (PLAVIX) 75 MG tablet  Take 1 tablet by mouth daily    losartan (COZAAR) 50 MG tablet  Take 1 tablet by mouth daily    pantoprazole (PROTONIX) 40 MG tablet  Take 1 tablet by mouth daily    OXcarbazepine (TRILEPTAL) 150 MG tablet  TAKE 2 TABLETS BY MOUTH TWICE DAILY    nitroGLYCERIN (NITROSTAT) 0.4 MG SL tablet  Place 1 tablet under the tongue every 5 minutes as needed          Current Discharge Medication List        Time Spent on Discharge:  60 minutes were spent in patient examination, evaluation, counseling as well as medication reconciliation, prescriptions for required medications, discharge plan, and follow up.    Electronically signed by CAITLYN Adan CNP on 1/9/25 at 9:31 AM EST     Attending Supervising Physician's Attestation Statement  The patient is a 69 y.o. female. I have performed a history and physical examination of the patient. I discussed the case with the nurse practitioner.     I reviewed the patient's Past Medical History, Past Surgical History, Medications, and Allergies.      Physical Exam:  Vitals          Vitals:     01/07/25 2353 01/08/25 0340 01/08/25 0845 01/08/25 1117   BP: (!) 141/68 (!) 140/66 (!) 150/63 132/71   Pulse: 63 70 88 83   Resp: 16   18 18   Temp: 98.9 °F (37.2 °C) 98.2 °F (36.8 °C) 98.2 °F (36.8 °C) 98.1 °F (36.7 °C)   TempSrc: Oral Oral Oral Oral   SpO2: 100% 98%       Weight:           Height:                    Review of Systems - Respiratory ROS: no cough, shortness of breath, or wheezing  Cardiovascular ROS: no chest pain or dyspnea on exertion  Gastrointestinal ROS: no abdominal pain, change in bowel habits, or black

## 2025-01-09 NOTE — CARE COORDINATION
CALL TO [facility] AND SPOKE WITH MARIBETH. THEY RECEIVED REFERRAL, THEY WILL CALL BACK ONCE REVIEW COMPLETED.

## 2025-01-09 NOTE — PLAN OF CARE
Problem: Pain  Goal: Verbalizes/displays adequate comfort level or baseline comfort level  1/1/2025 1939 by Anay Yadav RN  Outcome: Progressing  1/1/2025 1708 by Kyung Florian RN  Outcome: Progressing     Problem: Discharge Planning  Goal: Discharge to home or other facility with appropriate resources  1/1/2025 1939 by Anay Yadav RN  Outcome: Progressing  1/1/2025 1708 by Kyung Florian RN  Outcome: Progressing  Flowsheets (Taken 1/1/2025 0845)  Discharge to home or other facility with appropriate resources: Identify barriers to discharge with patient and caregiver     Problem: ABCDS Injury Assessment  Goal: Absence of physical injury  1/1/2025 1939 by Anay Yadav RN  Outcome: Progressing  1/1/2025 1708 by Kyung Florian RN  Outcome: Progressing     Problem: Safety - Adult  Goal: Free from fall injury  1/1/2025 1939 by Anay Yadav RN  Outcome: Progressing  1/1/2025 1708 by Kyung Florian RN  Outcome: Progressing     Problem: Skin/Tissue Integrity - Adult  Goal: Skin integrity remains intact  1/1/2025 1939 by Anay Yadav RN  Outcome: Progressing  1/1/2025 1708 by Kyung Florian RN  Outcome: Progressing  Flowsheets (Taken 1/1/2025 0845)  Skin Integrity Remains Intact: Monitor for areas of redness and/or skin breakdown     Problem: Musculoskeletal - Adult  Goal: Return mobility to safest level of function  1/1/2025 1939 by Anay Yadav RN  Outcome: Progressing  1/1/2025 1708 by Kyung Florian RN  Outcome: Progressing  Flowsheets (Taken 1/1/2025 0845)  Return Mobility to Safest Level of Function: Assess patient stability and activity tolerance for standing, transferring and ambulating with or without assistive devices     Problem: Skin/Tissue Integrity  Goal: Absence of new skin breakdown  Description: 1.  Monitor for areas of redness and/or skin breakdown  2.  Assess vascular access sites hourly  3.  Every 4-6 hours minimum:  Change oxygen saturation probe site  4.  Every 
  Problem: Pain  Goal: Verbalizes/displays adequate comfort level or baseline comfort level  1/3/2025 0305 by Sola Hull RN  Outcome: Progressing  Flowsheets (Taken 1/2/2025 1922)  Verbalizes/displays adequate comfort level or baseline comfort level: Encourage patient to monitor pain and request assistance     Problem: Discharge Planning  Goal: Discharge to home or other facility with appropriate resources  Outcome: Progressing  Flowsheets (Taken 1/2/2025 1922)  Discharge to home or other facility with appropriate resources:   Identify barriers to discharge with patient and caregiver   Arrange for needed discharge resources and transportation as appropriate   Identify discharge learning needs (meds, wound care, etc)     Problem: ABCDS Injury Assessment  Goal: Absence of physical injury  Outcome: Progressing     Problem: Safety - Adult  Goal: Free from fall injury  Outcome: Progressing     Problem: Skin/Tissue Integrity - Adult  Goal: Skin integrity remains intact  Outcome: Progressing     Problem: Musculoskeletal - Adult  Goal: Return mobility to safest level of function  Outcome: Progressing  Flowsheets (Taken 1/2/2025 1922)  Return Mobility to Safest Level of Function: Assess patient stability and activity tolerance for standing, transferring and ambulating with or without assistive devices     Problem: Skin/Tissue Integrity  Goal: Absence of new skin breakdown  Description: 1.  Monitor for areas of redness and/or skin breakdown  2.  Assess vascular access sites hourly  3.  Every 4-6 hours minimum:  Change oxygen saturation probe site  4.  Every 4-6 hours:  If on nasal continuous positive airway pressure, respiratory therapy assess nares and determine need for appliance change or resting period.  Outcome: Progressing     
  Problem: Pain  Goal: Verbalizes/displays adequate comfort level or baseline comfort level  1/7/2025 1134 by Chiquita Lee RN  Outcome: Progressing  1/7/2025 0320 by Dot Raman RN  Outcome: Progressing     Problem: Discharge Planning  Goal: Discharge to home or other facility with appropriate resources  1/7/2025 1134 by Chiquita Lee RN  Outcome: Progressing  Flowsheets (Taken 1/7/2025 0831)  Discharge to home or other facility with appropriate resources: Identify barriers to discharge with patient and caregiver  1/7/2025 0320 by Dot Raman RN  Outcome: Progressing  Flowsheets (Taken 1/6/2025 2000)  Discharge to home or other facility with appropriate resources: Identify barriers to discharge with patient and caregiver     Problem: ABCDS Injury Assessment  Goal: Absence of physical injury  Outcome: Progressing     Problem: Safety - Adult  Goal: Free from fall injury  1/7/2025 1134 by Chiquita Lee RN  Outcome: Progressing  1/7/2025 0320 by Dot Raman RN  Outcome: Progressing     Problem: Skin/Tissue Integrity - Adult  Goal: Skin integrity remains intact  Outcome: Progressing  Flowsheets  Taken 1/7/2025 1133  Skin Integrity Remains Intact: Monitor for areas of redness and/or skin breakdown  Taken 1/7/2025 0831  Skin Integrity Remains Intact: Monitor for areas of redness and/or skin breakdown     Problem: Musculoskeletal - Adult  Goal: Return mobility to safest level of function  Outcome: Progressing  Flowsheets (Taken 1/7/2025 0831)  Return Mobility to Safest Level of Function: Assess patient stability and activity tolerance for standing, transferring and ambulating with or without assistive devices     Problem: Skin/Tissue Integrity  Goal: Absence of new skin breakdown  Description: 1.  Monitor for areas of redness and/or skin breakdown  2.  Assess vascular access sites hourly  3.  Every 4-6 hours minimum:  Change oxygen saturation probe site  4.  Every 4-6 hours:  If on nasal continuous 
  Problem: Pain  Goal: Verbalizes/displays adequate comfort level or baseline comfort level  1/8/2025 1120 by Chiquita Lee, RN  Outcome: Progressing  1/7/2025 2308 by Jessica Azul RN  Outcome: Progressing     Problem: Safety - Adult  Goal: Free from fall injury  1/8/2025 1120 by Chiquita Lee, RN  Outcome: Progressing  1/7/2025 2308 by Jessica Azul RN  Outcome: Progressing     Problem: Skin/Tissue Integrity  Goal: Absence of new skin breakdown  Description: 1.  Monitor for areas of redness and/or skin breakdown  2.  Assess vascular access sites hourly  3.  Every 4-6 hours minimum:  Change oxygen saturation probe site  4.  Every 4-6 hours:  If on nasal continuous positive airway pressure, respiratory therapy assess nares and determine need for appliance change or resting period.  1/8/2025 1120 by Chiquita Lee, RN  Outcome: Progressing  1/7/2025 2308 by Jessica Azul RN  Outcome: Progressing     
  Problem: Pain  Goal: Verbalizes/displays adequate comfort level or baseline comfort level  1/8/2025 2039 by Anay Yadav RN  Outcome: Progressing  1/8/2025 1120 by Chiquita Lee RN  Outcome: Progressing     Problem: Safety - Adult  Goal: Free from fall injury  1/8/2025 2039 by Anay Yadav RN  Outcome: Progressing  1/8/2025 1120 by Chiquita Lee RN  Outcome: Progressing     Problem: Skin/Tissue Integrity  Goal: Absence of new skin breakdown  Description: 1.  Monitor for areas of redness and/or skin breakdown  2.  Assess vascular access sites hourly  3.  Every 4-6 hours minimum:  Change oxygen saturation probe site  4.  Every 4-6 hours:  If on nasal continuous positive airway pressure, respiratory therapy assess nares and determine need for appliance change or resting period.  1/8/2025 2039 by Anay Yadav RN  Outcome: Progressing  1/8/2025 1120 by Chiquita Lee RN  Outcome: Progressing     
  Problem: Pain  Goal: Verbalizes/displays adequate comfort level or baseline comfort level  12/30/2024 2219 by Terry Pena RN  Outcome: Not Progressing  12/30/2024 0901 by Terry Pena RN  Outcome: Not Progressing  12/30/2024 0859 by Terry Pena RN  Outcome: Not Progressing     Problem: Discharge Planning  Goal: Discharge to home or other facility with appropriate resources  12/30/2024 2219 by Terry Pena RN  Outcome: Not Progressing  Flowsheets (Taken 12/30/2024 2000)  Discharge to home or other facility with appropriate resources:   Identify barriers to discharge with patient and caregiver   Arrange for needed discharge resources and transportation as appropriate   Identify discharge learning needs (meds, wound care, etc)  12/30/2024 0901 by Terry Pena RN  Outcome: [facility] [place] Progressing  12/30/2024 0859 by Terry Pena RN  Outcome: Progressing  Flowsheets (Taken 12/30/2024 0800)  Discharge to home or other facility with appropriate resources:   Identify barriers to discharge with patient and caregiver   Arrange for needed discharge resources and transportation as appropriate   Identify discharge learning needs (meds, wound care, etc)     Problem: ABCDS Injury Assessment  Goal: Absence of physical injury  12/30/2024 2219 by Terry Pena RN  Outcome: Not Progressing  12/30/2024 0901 by Terry Pena RN  Outcome: [facility] [place] Progressing  12/30/2024 0859 by Terry Pena RN  Outcome: Progressing     Problem: Safety - Adult  Goal: Free from fall injury  12/30/2024 2219 by Terry Pena RN  Outcome: Not Progressing  12/30/2024 0901 by Terry Pena RN  [facility]  12/30/2024 0859 by Terry Pena RN  Outcome: Progressing     Problem: Skin/Tissue Integrity - Adult  Goal: Skin integrity remains intact  12/30/2024 2219 by Terry Pena RN  Outcome: Not Progressing  Flowsheets (Taken 12/30/2024 2000)  Skin Integrity Remains Intact:   Monitor for areas of redness and/or skin 
  Problem: Pain  Goal: Verbalizes/displays adequate comfort level or baseline comfort level  12/31/2024 2128 by Anay Yadav RN  Outcome: Progressing  12/31/2024 1515 by Halina Kelly RN  Outcome: Progressing     Problem: Discharge Planning  Goal: Discharge to home or other facility with appropriate resources  12/31/2024 2128 by Anay Yadav RN  Outcome: Progressing  12/31/2024 1515 by Halina Kelly RN  Outcome: Progressing     Problem: ABCDS Injury Assessment  Goal: Absence of physical injury  12/31/2024 2128 by Anay Yadav RN  Outcome: Progressing  12/31/2024 1515 by Halina Kelly RN  Outcome: Progressing     Problem: Safety - Adult  Goal: Free from fall injury  12/31/2024 2128 by Anay Yadav RN  Outcome: Progressing  12/31/2024 1515 by Halina Kelly RN  Outcome: Progressing     Problem: Skin/Tissue Integrity - Adult  Goal: Skin integrity remains intact  12/31/2024 2128 by Anay Yadav RN  Outcome: Progressing  12/31/2024 1515 by Halina Kelly RN  Outcome: Progressing     Problem: Musculoskeletal - Adult  Goal: Return mobility to safest level of function  12/31/2024 2128 by Anay Yadav RN  Outcome: Progressing  12/31/2024 1515 by Halina Kelly RN  Outcome: Progressing     Problem: Skin/Tissue Integrity  Goal: Absence of new skin breakdown  Description: 1.  Monitor for areas of redness and/or skin breakdown  2.  Assess vascular access sites hourly  3.  Every 4-6 hours minimum:  Change oxygen saturation probe site  4.  Every 4-6 hours:  If on nasal continuous positive airway pressure, respiratory therapy assess nares and determine need for appliance change or resting period.  Outcome: Progressing     
  Problem: Pain  Goal: Verbalizes/displays adequate comfort level or baseline comfort level  Outcome: Progressing     Problem: Discharge Planning  Goal: Discharge to home or other facility with appropriate resources  Outcome: Progressing     Problem: ABCDS Injury Assessment  Goal: Absence of physical injury  Outcome: Progressing     Problem: Safety - Adult  Goal: Free from fall injury  Outcome: Progressing     Problem: Skin/Tissue Integrity - Adult  Goal: Skin integrity remains intact  Outcome: Progressing     Problem: Musculoskeletal - Adult  Goal: Return mobility to safest level of function  Outcome: Progressing     Problem: Skin/Tissue Integrity  Goal: Absence of new skin breakdown  Description: 1.  Monitor for areas of redness and/or skin breakdown  2.  Assess vascular access sites hourly  3.  Every 4-6 hours minimum:  Change oxygen saturation probe site  4.  Every 4-6 hours:  If on nasal continuous positive airway pressure, respiratory therapy assess nares and determine need for appliance change or resting period.  Outcome: Progressing     
  Problem: Pain  Goal: Verbalizes/displays adequate comfort level or baseline comfort level  Outcome: Progressing     Problem: Discharge Planning  Goal: Discharge to home or other facility with appropriate resources  Outcome: Progressing     Problem: ABCDS Injury Assessment  Goal: Absence of physical injury  Outcome: Progressing     Problem: Safety - Adult  Goal: Free from fall injury  Outcome: Progressing     Problem: Skin/Tissue Integrity - Adult  Goal: Skin integrity remains intact  Outcome: Progressing     Problem: Musculoskeletal - Adult  Goal: Return mobility to safest level of function  Outcome: Progressing     Problem: Skin/Tissue Integrity  Goal: Absence of new skin breakdown  Description: 1.  Monitor for areas of redness and/or skin breakdown  2.  Assess vascular access sites hourly  3.  Every 4-6 hours minimum:  Change oxygen saturation probe site  4.  Every 4-6 hours:  If on nasal continuous positive airway pressure, respiratory therapy assess nares and determine need for appliance change or resting period.  Outcome: Progressing     Problem: Neurosensory - Adult  Goal: Achieves stable or improved neurological status  Outcome: Progressing  Goal: Absence of seizures  Outcome: Progressing  Goal: Remains free of injury related to seizures activity  Outcome: Progressing  Goal: Achieves maximal functionality and self care  Outcome: Progressing     Problem: Respiratory - Adult  Goal: Achieves optimal ventilation and oxygenation  Outcome: Progressing     Problem: Cardiovascular - Adult  Goal: Maintains optimal cardiac output and hemodynamic stability  Outcome: Progressing  Goal: Absence of cardiac dysrhythmias or at baseline  Outcome: Progressing     
  Problem: Pain  Goal: Verbalizes/displays adequate comfort level or baseline comfort level  Outcome: Progressing     Problem: Discharge Planning  Goal: Discharge to home or other facility with appropriate resources  Outcome: Progressing  Flowsheets (Taken 1/1/2025 0845)  Discharge to home or other facility with appropriate resources: Identify barriers to discharge with patient and caregiver     Problem: ABCDS Injury Assessment  Goal: Absence of physical injury  Outcome: Progressing     Problem: Safety - Adult  Goal: Free from fall injury  Outcome: Progressing     Problem: Skin/Tissue Integrity - Adult  Goal: Skin integrity remains intact  Outcome: Progressing  Flowsheets (Taken 1/1/2025 0845)  Skin Integrity Remains Intact: Monitor for areas of redness and/or skin breakdown     Problem: Musculoskeletal - Adult  Goal: Return mobility to safest level of function  Outcome: Progressing  Flowsheets (Taken 1/1/2025 0845)  Return Mobility to Safest Level of Function: Assess patient stability and activity tolerance for standing, transferring and ambulating with or without assistive devices     Problem: Skin/Tissue Integrity  Goal: Absence of new skin breakdown  Description: 1.  Monitor for areas of redness and/or skin breakdown  2.  Assess vascular access sites hourly  3.  Every 4-6 hours minimum:  Change oxygen saturation probe site  4.  Every 4-6 hours:  If on nasal continuous positive airway pressure, respiratory therapy assess nares and determine need for appliance change or resting period.  Outcome: Progressing     
  Problem: Pain  Goal: Verbalizes/displays adequate comfort level or baseline comfort level  Outcome: Progressing     Problem: Discharge Planning  Goal: Discharge to home or other facility with appropriate resources  Outcome: Progressing  Flowsheets (Taken 1/6/2025 2000)  Discharge to home or other facility with appropriate resources: Identify barriers to discharge with patient and caregiver     Problem: Safety - Adult  Goal: Free from fall injury  Outcome: Progressing     
  Problem: Pain  Goal: Verbalizes/displays adequate comfort level or baseline comfort level  Outcome: Progressing     Problem: Safety - Adult  Goal: Free from fall injury  Outcome: Progressing     Problem: Skin/Tissue Integrity  Goal: Absence of new skin breakdown  Description: 1.  Monitor for areas of redness and/or skin breakdown  2.  Assess vascular access sites hourly  3.  Every 4-6 hours minimum:  Change oxygen saturation probe site  4.  Every 4-6 hours:  If on nasal continuous positive airway pressure, respiratory therapy assess nares and determine need for appliance change or resting period.  Outcome: Progressing     
  Problem: Pain  Goal: Verbalizes/displays adequate comfort level or baseline comfort level  Outcome: Progressing  Flowsheets (Taken 1/3/2025 2000)  Verbalizes/displays adequate comfort level or baseline comfort level: Encourage patient to monitor pain and request assistance     Problem: Discharge Planning  Goal: Discharge to home or other facility with appropriate resources  Outcome: Progressing  Flowsheets (Taken 1/3/2025 2000)  Discharge to home or other facility with appropriate resources:   Identify barriers to discharge with patient and caregiver   Arrange for needed discharge resources and transportation as appropriate   Identify discharge learning needs (meds, wound care, etc)     Problem: ABCDS Injury Assessment  Goal: Absence of physical injury  Outcome: Progressing     Problem: Safety - Adult  Goal: Free from fall injury  Outcome: Progressing     Problem: Skin/Tissue Integrity - Adult  Goal: Skin integrity remains intact  Outcome: Progressing  Flowsheets (Taken 1/3/2025 2000)  Skin Integrity Remains Intact: Monitor for areas of redness and/or skin breakdown     Problem: Musculoskeletal - Adult  Goal: Return mobility to safest level of function  Outcome: Progressing     Problem: Skin/Tissue Integrity  Goal: Absence of new skin breakdown  Description: 1.  Monitor for areas of redness and/or skin breakdown  2.  Assess vascular access sites hourly  3.  Every 4-6 hours minimum:  Change oxygen saturation probe site  4.  Every 4-6 hours:  If on nasal continuous positive airway pressure, respiratory therapy assess nares and determine need for appliance change or resting period.  Outcome: Progressing     
Nutrition Problem #1: Inadequate oral intake  Intervention: Food and/or Nutrient Delivery: Modify Current Diet, Start Oral Nutrition Supplement  Nutritional      
Problem: Pain  Goal: Verbalizes/displays adequate comfort level or baseline comfort level  12/30/2024 0901 by Terry Pena RN  Outcome: Not Progressing  12/30/2024 0859 by Terry Pena RN  Outcome: Not Progressing  12/30/2024 0616 by Evie Rodriguez RN  Outcome: Progressing  Flowsheets  Taken 12/30/2024 0600  Verbalizes/displays adequate comfort level or baseline comfort level:   Encourage patient to monitor pain and request assistance   Administer analgesics based on type and severity of pain and evaluate response  Taken 12/30/2024 0500  Verbalizes/displays adequate comfort level or baseline comfort level: Encourage patient to monitor pain and request assistance  Taken 12/30/2024 0448  Verbalizes/displays adequate comfort level or baseline comfort level: Encourage patient to monitor pain and request assistance  Taken 12/30/2024 0400  Verbalizes/displays adequate comfort level or baseline comfort level: Encourage patient to monitor pain and request assistance  Taken 12/30/2024 0142  Verbalizes/displays adequate comfort level or baseline comfort level:   Encourage patient to monitor pain and request assistance   Administer analgesics based on type and severity of pain and evaluate response  Taken 12/30/2024 0130  Verbalizes/displays adequate comfort level or baseline comfort level: Encourage patient to monitor pain and request assistance  Taken 12/30/2024 0115  Verbalizes/displays adequate comfort level or baseline comfort level: Encourage patient to monitor pain and request assistance     
injury  Outcome: Progressing     
Progressing  Flowsheets (Taken 1/7/2025 0831)  Achieves optimal ventilation and oxygenation: Assess for changes in respiratory status     Problem: Cardiovascular - Adult  Goal: Maintains optimal cardiac output and hemodynamic stability  1/7/2025 1830 by Chiquita Lee RN  Outcome: Completed  1/7/2025 1134 by Chiquita Lee RN  Outcome: Progressing  Flowsheets (Taken 1/7/2025 0831)  Maintains optimal cardiac output and hemodynamic stability: Monitor blood pressure and heart rate  Goal: Absence of cardiac dysrhythmias or at baseline  1/7/2025 1830 by Chiquita Lee RN  Outcome: Completed  1/7/2025 1134 by Chiquita Lee RN  Outcome: Progressing  Flowsheets (Taken 1/7/2025 0831)  Absence of cardiac dysrhythmias or at baseline: Monitor cardiac rate and rhythm     Problem: Nutrition Deficit:  Goal: Optimize nutritional status  1/7/2025 1830 by Chiquita Lee RN  Outcome: Completed  1/7/2025 1134 by Chiquita Lee RN  Outcome: Progressing

## 2025-01-09 NOTE — TELEPHONE ENCOUNTER
Katie from [facility] called requesting verbal orders for PT, OT and skilled nursing.    Katie: [phone]

## 2025-01-09 NOTE — CARE COORDINATION
Call from Jovita at [facility], pt has been accepted. Dc plan home today, per Dr Franco no P2P to be done. RN to fax dc instructions and AVS to [facility] [phone].

## 2025-01-10 ENCOUNTER — DOCUMENTATION (OUTPATIENT)
Dept: PRIMARY CARE | Facility: CLINIC | Age: 70
End: 2025-01-10
Payer: MEDICARE

## 2025-01-10 NOTE — PROGRESS NOTES
Sedation Pre- Procedure Evaluation    Patient name: Rekha Russ  YOB: 1955  MRN: [id number]   Allergies:   Bactrim [sulfamethoxazole-trimethoprim] and Seasonal        Type Of Sedation  []local anesthesia  [x]moderate (conscious sedation)  []deep/analgesia      RN Focused History    Yes        No  N/A  []   [x]  Previous problem with airway anesthesia, sedation, or family history of the same    []   [x]  History Of tobacco, alcohol, or substance abuse     []   [x]  Problems associated with stridor, snoring, or sleep apnea    []   [] [x] Pediatric patient, history of premature birth or ventilator support (Moderate Sedation Only)     []   [] [x] Moderate Sedation: Clear liquids within 6 hours of sedation and NPO within 2 hours    []   [] [x] Deep Sedation:Clear liquids within 6 hours of sedation and NPO within 2 hours      NPO since:   Unknown, code purple       Vitals, Cardiac Rhythm, Pain:   Vitals  Pulse: 96  Heart Rate Source: Monitor  Respirations: (!) 32  BP: (!) 106/94  MAP (Calculated): 98  Cardiac Rhythm: Sinus tachy  Pain Assessment  Pain Assessment: 0-10  Pain Level: 4      EKG:  EKG Interpretation: ST elevation in  Lead II.      Nahomi Scale:       Activity:  2 - Able to move 4 extremities voluntarily on command  Respiration:  2 - Able to breathe deeply and cough freely  Circulation:  2 - BP+/- 20mmHg of normal  Consciousness:  2 - Fully awake  Oxygen Saturation (color):  2 - Able to maintain oxygen saturation >92% on room air      Prior to Admission medications    Medication Sig Start Date End Date Taking? Authorizing Provider   aspirin 81 MG EC tablet Take 1 tablet by mouth daily 12/29/24   Bobby Franco DO   metoprolol succinate (TOPROL XL) 50 MG extended release tablet Take 1 tablet by mouth daily 12/29/24 HolBobby leung DO   aspirin 81 MG EC tablet Take 1 tablet by mouth daily 12/28/24 1/27/25  Bobby Franco DO   famotidine (PEPCID) 20 MG tablet Take 1 tablet by 
  Physician Progress Note      PATIENT:               SAMSON BUTT  CSN #:                  [id number]  :                       1955  ADMIT DATE:       2024 10:40 PM  DISCH DATE:  RESPONDING  PROVIDER #:        Janette Phelps DO          QUERY TEXT:    Patient admitted with cardiac arrest.   Noted documentation of STEMI by Dr. Franco and NSTEMI by Dr. Gapsar.  If possible, please document in progress notes and discharge summary if you   are evaluating and /or treating any of the following:    The medical record reflects the following:  Risk Factors: cardiac arrest  Clinical Indicators:troponin 1632/881 Sinus rhythm with 1st degree AV block   Possible Left atrial enlargement Septal infarct , age undetermined Lateral   infarct (cited on or before 26-DEC-2024) Marked ST abnormality, possible   inferior subendocardial injury  ACUTE MI / STEMI  Treatment: PCI pf proximal to mid LAD as well as PTCA of the ostial diagonal 1  Options provided:  -- STEMI confirmed and NSTEMI ruled out  -- NSTEMI confirmed and STEMI ruled out  -- Other - I will add my own diagnosis  -- Disagree - Not applicable / Not valid  -- Disagree - Clinically unable to determine / Unknown  -- Refer to Clinical Documentation Reviewer    PROVIDER RESPONSE TEXT:    After study, STEMI confirmed and NSTEMI ruled out.    Query created by: Radha Castanon on 2024 12:58 PM      Electronically signed by:  Janette Phelps DO 2024 3:13 PM          
  Pulmonary Progress Note    2024 1:15 PM    Subjective:   Admit Date: 2024  PCP: Glen Cat MD    Chief Complaint   Patient presents with    Cardiac Arrest     Interval History: Transfer out of the ICU.  Has been on nasal cannula.  Blood pressure is a bit on the higher end.  Nocturnal desaturations.     14 points review of systems has been obtained and negative except to was mentioned in HPI.     Medications:   Scheduled Meds:   famotidine  20 mg Oral BID    metoprolol succinate  50 mg Oral Daily    OXcarbazepine  300 mg Oral BID    pantoprazole  40 mg Oral Daily    losartan  50 mg Oral Daily    aspirin  81 mg Oral Daily    ticagrelor  90 mg Oral BID    heparin (porcine)  5,000 Units SubCUTAneous BID    atorvastatin  40 mg Oral Nightly     Continuous Infusions:      Objective:   Vitals:   Temp (24hrs), Av.1 °F (37.3 °C), Min:98.8 °F (37.1 °C), Max:99.7 °F (37.6 °C)    BP (!) 163/70   Pulse 94   Temp 98.8 °F (37.1 °C) (Oral)   Resp 16   Ht 1.651 m (5' 5\")   Wt 90.1 kg (198 lb 10.2 oz)   LMP  (LMP Unknown)   SpO2 95%   BMI 33.05 kg/m²   I/O:24HR INTAKE/OUTPUT:    Intake/Output Summary (Last 24 hours) at 2024 1315  Last data filed at 2024 1900  Gross per 24 hour   Intake 520 ml   Output 450 ml   Net 70 ml     Urine output    Physical Exam:  General appearance - alert, ill appearing, and in no distress  Mental status - alert, oriented to person, place, and time  Eyes - pupils equal and reactive, extraocular eye movements intact  Nose - normal and patent, no erythema, discharge or polyps  Neck - supple, no significant adenopathy  Chest - clear to auscultation, no wheezes, rales or rhonchi, symmetric air entry  Heart - normal rate, regular rhythm, normal S1, S2, no murmurs, rubs, clicks or gallops  Abdomen - soft, nontender, nondistended, no masses or organomegaly  Rectal - deferred, not clinically indicated  Neurological - alert, oriented, normal speech, no focal findings or 
  Pulmonary Progress Note    2025 10:07 AM    Subjective:   Admit Date: 2024  PCP: Glen Cat MD    Chief Complaint   Patient presents with    Cardiac Arrest     Interval History: Has been doing well overall on oxygen when she is sleeping.    14 points review of systems has been obtained and negative except to was mentioned in HPI.     Medications:   Scheduled Meds:   polyethylene glycol  17 g Oral Daily    losartan  100 mg Oral Daily    famotidine  20 mg Oral BID    metoprolol succinate  50 mg Oral Daily    OXcarbazepine  300 mg Oral BID    pantoprazole  40 mg Oral Daily    aspirin  81 mg Oral Daily    ticagrelor  90 mg Oral BID    heparin (porcine)  5,000 Units SubCUTAneous BID    atorvastatin  40 mg Oral Nightly     Continuous Infusions:      Objective:   Vitals:   Temp (24hrs), Av.4 °F (36.9 °C), Min:98.1 °F (36.7 °C), Max:98.5 °F (36.9 °C)    BP (!) 152/71   Pulse 74   Temp 98.5 °F (36.9 °C) (Oral)   Resp 18   Ht 1.651 m (5' 5\")   Wt 90.4 kg (199 lb 4.7 oz)   LMP  (LMP Unknown)   SpO2 98%   BMI 33.16 kg/m²   I/O:24HR INTAKE/OUTPUT:  No intake or output data in the 24 hours ending 25 1007    Urine output    Physical Exam:  General appearance - alert, ill appearing, and in no distress  Mental status - alert, oriented to person, place, and time  Eyes - pupils equal and reactive, extraocular eye movements intact  Nose - normal and patent, no erythema, discharge or polyps  Neck - supple, no significant adenopathy  Chest - clear to auscultation, no wheezes, rales or rhonchi, symmetric air entry  Heart - normal rate, regular rhythm, normal S1, S2, no murmurs, rubs, clicks or gallops  Abdomen - soft, nontender, nondistended, no masses or organomegaly  Rectal - deferred, not clinically indicated  Neurological - alert, oriented, normal speech, no focal findings or movement disorder noted, motor and sensory grossly normal bilaterally  Musculoskeletal - no joint tenderness, deformity or 
  Pulmonary Progress Note    2025 1:51 PM    Subjective:   Admit Date: 2024  PCP: Glen Cat MD    Chief Complaint   Patient presents with    Cardiac Arrest     Interval History: Attempted to take her off of the oxygen yesterday but she is back on it.  Apparently she became symptomatic.  No documentation of hypoxia and discharge.    14 points review of systems has been obtained and negative except to was mentioned in HPI.     Medications:   Scheduled Meds:   famotidine  20 mg Oral BID    metoprolol succinate  50 mg Oral Daily    OXcarbazepine  300 mg Oral BID    pantoprazole  40 mg Oral Daily    losartan  50 mg Oral Daily    aspirin  81 mg Oral Daily    ticagrelor  90 mg Oral BID    heparin (porcine)  5,000 Units SubCUTAneous BID    atorvastatin  40 mg Oral Nightly     Continuous Infusions:      Objective:   Vitals:   Temp (24hrs), Av.8 °F (37.1 °C), Min:98.2 °F (36.8 °C), Max:99.1 °F (37.3 °C)    BP (!) 154/76   Pulse 81   Temp 98.8 °F (37.1 °C) (Oral)   Resp 18   Ht 1.651 m (5' 5\")   Wt 90.6 kg (199 lb 11.2 oz)   LMP  (LMP Unknown)   SpO2 98%   BMI 33.23 kg/m²   I/O:24HR INTAKE/OUTPUT:    Intake/Output Summary (Last 24 hours) at 2025 1351  Last data filed at 2024 2136  Gross per 24 hour   Intake 220 ml   Output --   Net 220 ml     Urine output    Physical Exam:  General appearance - alert, ill appearing, and in no distress  Mental status - alert, oriented to person, place, and time  Eyes - pupils equal and reactive, extraocular eye movements intact  Nose - normal and patent, no erythema, discharge or polyps  Neck - supple, no significant adenopathy  Chest - clear to auscultation, no wheezes, rales or rhonchi, symmetric air entry  Heart - normal rate, regular rhythm, normal S1, S2, no murmurs, rubs, clicks or gallops  Abdomen - soft, nontender, nondistended, no masses or organomegaly  Rectal - deferred, not clinically indicated  Neurological - alert, oriented, normal speech, no 
  Pulmonary Progress Note    2025 4:08 PM    Subjective:   Admit Date: 2024  PCP: Glen Cat MD    Chief Complaint   Patient presents with    Cardiac Arrest     Interval History: Has been doing well.  Continues to be on room air.       14 points review of systems has been obtained and negative except to was mentioned in HPI.     Medications:   Scheduled Meds:   losartan  100 mg Oral Daily    famotidine  20 mg Oral BID    metoprolol succinate  50 mg Oral Daily    OXcarbazepine  300 mg Oral BID    pantoprazole  40 mg Oral Daily    aspirin  81 mg Oral Daily    ticagrelor  90 mg Oral BID    heparin (porcine)  5,000 Units SubCUTAneous BID    atorvastatin  40 mg Oral Nightly     Continuous Infusions:      Objective:   Vitals:   Temp (24hrs), Av.4 °F (36.9 °C), Min:97.3 °F (36.3 °C), Max:99.5 °F (37.5 °C)    /65   Pulse 76   Temp 98.1 °F (36.7 °C) (Oral)   Resp 18   Ht 1.651 m (5' 5\")   Wt 90.4 kg (199 lb 4.7 oz)   LMP  (LMP Unknown)   SpO2 96%   BMI 33.16 kg/m²   I/O:24HR INTAKE/OUTPUT:    Intake/Output Summary (Last 24 hours) at 2025 1608  Last data filed at 2025 0502  Gross per 24 hour   Intake --   Output 1150 ml   Net -1150 ml     Urine output    Physical Exam:  General appearance - alert, ill appearing, and in no distress  Mental status - alert, oriented to person, place, and time  Eyes - pupils equal and reactive, extraocular eye movements intact  Nose - normal and patent, no erythema, discharge or polyps  Neck - supple, no significant adenopathy  Chest - clear to auscultation, no wheezes, rales or rhonchi, symmetric air entry  Heart - normal rate, regular rhythm, normal S1, S2, no murmurs, rubs, clicks or gallops  Abdomen - soft, nontender, nondistended, no masses or organomegaly  Rectal - deferred, not clinically indicated  Neurological - alert, oriented, normal speech, no focal findings or movement disorder noted, motor and sensory grossly normal 
.  Arterial sheath removed with pressure held at site for approximately 5-10 minutes.  No bleeding at insertion site, no hematomas noted.  4 x 4 pressure dressing applied with clear Tegaderm to cover.  
0855: Morning assessment and home meds completed. Dr. Gaspar at bedside and instructed nurse to hold off on home med pass till pharmacy rounds with ICU rounds. Also made aware of HTN see new orders. Hien RIVAS made aware of patient chest and back pain from CPR last night. See new orders. Holding off on giving IV morphine per patient request until echo is performed. Will continue to monitor.     1219: Patient has low grade fever with increasing lower back pain which she believes was from her fall. Will make Intensivist aware.     1305: Offered tramadol to patient, but she refused at this time and wanted to see if it improved with her position change.     2000: Evening assessment completed. Patient daughter at bedside. Updated on plan of care. No complaints at this time and is satisfied with pain rating. Will continue to monitor.    2100: Attempted to take patient off 02. Patient destat to 88% on RA when asleep. Placed back on 2L NC.    
Assessment completed this shift. Alert and oriented x 4. Up with assist and walker to chair. Ambulated in monique with therapy. Lungs clear and BS active. Denies nausea. Tramadol 50 mg given at 1054 for c/o aching/soreness in back and chest. Will continue to monitor.  Electronically signed by Tina Smith RN on 1/6/2025 at 11:16 AM    
CLINICAL PHARMACY NOTE: MEDS TO BEDS    Total # of Prescriptions Filled: 3   The following medications were delivered to the patient:  Brilinta 90 mg Tab  Aspirin 81 mg Tab  Nitroglycerin 0.4 mg Tab  Losartan & Metoprolol Too soon     Additional Documentation:    
Cardiology Follow up Note    Subjective:  More anxious today, says doesn't feel she's safe to go home and wants rehab. Mild SOB last night but now better today. No chest pain  BP still elevated    1/3/2025: Patient laying in bed during time of examination and appears to be in NAD.   Denies CP, shortness of breath.   Possible SNF placement.       Review of Systems:   As noted in the HPI*    Objective:  BP (!) 129/55   Pulse 74   Temp 98.6 °F (37 °C) (Oral)   Resp 18   Ht 1.651 m (5' 5\")   Wt 90.4 kg (199 lb 4.7 oz)   LMP  (LMP Unknown)   SpO2 92%   BMI 33.16 kg/m²   Constitutional: alert, cooperative, in no distress  Eyes: Conjunctiva clear; no scleral icturus. PERRLA   Respiratory: clear bilaterally, poor effort due to chest wall pain.  Musculoskeletal: + chest wall tenderness. No clubbing or cyanosis.   Cardiovascular: regular rate and rhythm, S1, S2 normal, no murmur, click, rub or gallop. No carotid bruit. No JVD. Pulses 2+ and symmetric.   GI: soft, non-tender, non-distended. Bowel sounds normal. No masses,  no organomegaly  MSK: extremities normal, atraumatic, no clubbing. + chest wall pain.    Neurologic: Cranial nerves grossly intact.  No focal motor and/or sensory deficits.  Skin: No rashes or lesions. No cyanosis.       Intake/Output Summary (Last 24 hours) at 1/3/2025 0941  Last data filed at 1/3/2025 0624  Gross per 24 hour   Intake --   Output 200 ml   Net -200 ml       Medications:  losartan, 100 mg, Daily  famotidine, 20 mg, BID  metoprolol succinate, 50 mg, Daily  OXcarbazepine, 300 mg, BID  pantoprazole, 40 mg, Daily  aspirin, 81 mg, Daily  ticagrelor, 90 mg, BID  heparin (porcine), 5,000 Units, BID  atorvastatin, 40 mg, Nightly         Recent Labs     01/01/25  0558 01/02/25  0537   HGB 9.9* 9.9*   WBC 6.8 6.7    187    138   K 4.1 4.1   CO2 26 24   BUN 14 14     Cr 0.70    Telemetry: SR HR 80s    2D Echo:   12/29/24    ECHO (TTE) LIMITED (PRN CONTRAST/BUBBLE/STRAIN/3D) 
Cardiology Follow up Note    Subjective:  More anxious today, says doesn't feel she's safe to go home and wants rehab. Mild SOB last night but now better today. No chest pain  BP still elevated    1/3/2025: Patient laying in bed during time of examination and appears to be in NAD.   Denies CP, shortness of breath.   Possible SNF placement.     1/4/2025  Patient laying in bed looks comfortable  Reports chest pain only when she moves her upper body which could be related to chest compressions  Denies shortness of breath  Telemetry sinus rhythm  Patient reports that she would like to go to a SNF close to her daughter    1/5/2025  Patient laying in bed  Denies chest pain or shortness of breath  Still waiting on placement  Plan to transfer patient to [facility] in [facility] on board coordinating    1/6/2025: Patient resting in bed comfortably.   Denying anginal symptoms or shortness of breath.   Pre cert pending for [facility]  Lab work stable, vital signs stable.    1-7-25: resting. No new issues.     Review of Systems:   As noted in the HPI*    Objective:  BP (!) 150/63   Pulse 88   Temp 98.2 °F (36.8 °C) (Oral)   Resp 18   Ht 1.651 m (5' 5\")   Wt 90.4 kg (199 lb 4.7 oz)   LMP  (LMP Unknown)   SpO2 98%   BMI 33.16 kg/m²   Constitutional: alert, cooperative, in no distress  Eyes: Conjunctiva clear; no scleral icturus. PERRLA   Respiratory: clear bilaterally, poor effort due to chest wall pain.  Musculoskeletal: + chest wall tenderness. No clubbing or cyanosis.   Cardiovascular: regular rate and rhythm, S1, S2 normal, no murmur, click, rub or gallop. No carotid bruit. No JVD. Pulses 2+ and symmetric.   GI: soft, non-tender, non-distended. Bowel sounds normal. No masses,  no organomegaly  MSK: extremities normal, atraumatic, no clubbing. + chest wall pain.    Neurologic: Cranial nerves grossly intact.  No focal motor and/or sensory deficits.  Skin: No rashes or lesions. No cyanosis.       Intake/Output 
Cardiology Follow up Note    Subjective:  More anxious today, says doesn't feel she's safe to go home and wants rehab. Mild SOB last night but now better today. No chest pain  BP still elevated    1/3/2025: Patient laying in bed during time of examination and appears to be in NAD.   Denies CP, shortness of breath.   Possible SNF placement.     1/4/2025  Patient laying in bed looks comfortable  Reports chest pain only when she moves her upper body which could be related to chest compressions  Denies shortness of breath  Telemetry sinus rhythm  Patient reports that she would like to go to a SNF close to her daughter    1/5/2025  Patient laying in bed  Denies chest pain or shortness of breath  Still waiting on placement  Plan to transfer patient to [facility] in [facility] on board coordinating    1/6/2025: Patient resting in bed comfortably.   Denying anginal symptoms or shortness of breath.   Pre cert pending for [facility]  Lab work stable, vital signs stable.      Review of Systems:   As noted in the HPI*    Objective:  BP (!) 145/65   Pulse 71   Temp 98.4 °F (36.9 °C) (Oral)   Resp 16   Ht 1.651 m (5' 5\")   Wt 90.4 kg (199 lb 4.7 oz)   LMP  (LMP Unknown)   SpO2 99%   BMI 33.16 kg/m²   Constitutional: alert, cooperative, in no distress  Eyes: Conjunctiva clear; no scleral icturus. PERRLA   Respiratory: clear bilaterally, poor effort due to chest wall pain.  Musculoskeletal: + chest wall tenderness. No clubbing or cyanosis.   Cardiovascular: regular rate and rhythm, S1, S2 normal, no murmur, click, rub or gallop. No carotid bruit. No JVD. Pulses 2+ and symmetric.   GI: soft, non-tender, non-distended. Bowel sounds normal. No masses,  no organomegaly  MSK: extremities normal, atraumatic, no clubbing. + chest wall pain.    Neurologic: Cranial nerves grossly intact.  No focal motor and/or sensory deficits.  Skin: No rashes or lesions. No cyanosis.       Intake/Output Summary (Last 24 hours) at 1/6/2025 
Cardiology Follow up Note    Subjective:  More anxious today, says doesn't feel she's safe to go home and wants rehab. Mild SOB last night but now better today. No chest pain  BP still elevated    1/3/2025: Patient laying in bed during time of examination and appears to be in NAD.   Denies CP, shortness of breath.   Possible SNF placement.     1/4/2025  Patient laying in bed looks comfortable  Reports chest pain only when she moves her upper body which could be related to chest compressions  Denies shortness of breath  Telemetry sinus rhythm  Patient reports that she would like to go to a SNF close to her daughter    1/5/2025  Patient laying in bed  Denies chest pain or shortness of breath  Still waiting on placement  Plan to transfer patient to [facility] in [facility] on board coordinating      Review of Systems:   As noted in the HPI*    Objective:  BP (!) 171/85   Pulse 74   Temp 99.1 °F (37.3 °C) (Oral)   Resp 18   Ht 1.651 m (5' 5\")   Wt 90.4 kg (199 lb 4.7 oz)   LMP  (LMP Unknown)   SpO2 97%   BMI 33.16 kg/m²   Constitutional: alert, cooperative, in no distress  Eyes: Conjunctiva clear; no scleral icturus. PERRLA   Respiratory: clear bilaterally, poor effort due to chest wall pain.  Musculoskeletal: + chest wall tenderness. No clubbing or cyanosis.   Cardiovascular: regular rate and rhythm, S1, S2 normal, no murmur, click, rub or gallop. No carotid bruit. No JVD. Pulses 2+ and symmetric.   GI: soft, non-tender, non-distended. Bowel sounds normal. No masses,  no organomegaly  MSK: extremities normal, atraumatic, no clubbing. + chest wall pain.    Neurologic: Cranial nerves grossly intact.  No focal motor and/or sensory deficits.  Skin: No rashes or lesions. No cyanosis.       Intake/Output Summary (Last 24 hours) at 1/5/2025 1255  Last data filed at 1/5/2025 1003  Gross per 24 hour   Intake 350 ml   Output --   Net 350 ml       Medications:  polyethylene glycol, 17 g, Daily  losartan, 100 mg, 
Cardiology Follow up Note    Subjective:  Still with sternal musculoskeletal pain but no anginal CP. Mild SOB last night but now better today.     Review of Systems:   As noted in the HPI*    Objective:  BP (!) 164/68   Pulse 79   Temp 98.8 °F (37.1 °C) (Oral)   Resp 18   Ht 1.651 m (5' 5\")   Wt 90.6 kg (199 lb 11.2 oz)   LMP  (LMP Unknown)   SpO2 99%   BMI 33.23 kg/m²   Constitutional: alert, cooperative, in no distress  Eyes: Conjunctiva clear; no scleral icturus. PERRLA   Respiratory: clear bilaterally, poor effort due to chest wall pain.  Musculoskeletal: + chest wall tenderness. No clubbing or cyanosis.   Cardiovascular: regular rate and rhythm, S1, S2 normal, no murmur, click, rub or gallop. No carotid bruit. No JVD. Pulses 2+ and symmetric.   GI: soft, non-tender, non-distended. Bowel sounds normal. No masses,  no organomegaly  MSK: extremities normal, atraumatic, no clubbing. + chest wall pain.    Neurologic: Cranial nerves grossly intact.  No focal motor and/or sensory deficits.  Skin: No rashes or lesions. No cyanosis.       Intake/Output Summary (Last 24 hours) at 1/1/2025 1612  Last data filed at 12/31/2024 2136  Gross per 24 hour   Intake 220 ml   Output --   Net 220 ml       Medications:  famotidine, 20 mg, BID  metoprolol succinate, 50 mg, Daily  OXcarbazepine, 300 mg, BID  pantoprazole, 40 mg, Daily  losartan, 50 mg, Daily  aspirin, 81 mg, Daily  ticagrelor, 90 mg, BID  heparin (porcine), 5,000 Units, BID  atorvastatin, 40 mg, Nightly         Recent Labs     12/29/24  2252 12/29/24  2253 12/31/24  0628 01/01/25  0558   HGB 11.4*  --  10.4* 9.9*   WBC 16.3*  --  8.3 6.8     --  180 174     --  140 140   K 3.5  --  4.4 4.1   CO2 22  --  25 26   BUN 19  --  15 14   INR  --  1.1  --   --      Cr 0.70    Telemetry: SR HR 80s    2D Echo:   12/29/24    ECHO (TTE) LIMITED (PRN CONTRAST/BUBBLE/STRAIN/3D) 12/30/2024  1:27 PM (Final)    Interpretation Summary    Left Ventricle: The EF by 
Comprehensive Nutrition Assessment    Type and Reason for Visit:  Initial, LOS    Nutrition Recommendations/Plan:   Diet to be changed to No concentrated sweets, cardiac restrictions removed until pt eating > 75% of trays  Documented EMR intakes are inconsistent with pt c/o generalized anorexia  Begin diabetic oral supplement  @ B & D  Recommend addition of colace to daily bowel regimen      Malnutrition Assessment:  Malnutrition Status:  No malnutrition (01/06/25 1325)        Nutrition Assessment:    Nutritional status was adequate PTA, pt now complaing of very poor appetite, early satiety family has been bringing in an oral nutrition supplement from home    Nutrition Related Findings:    presents with a chief complaint of cardiopulmonary arrest. Hx significant for GERD/prediabetes ( reported by pt), Trigeminal neuralgia, Denies N/V/, appears  constipated with last BM noted 12/28 ( daily glycolax),  c/o generalized anorexia/early satiety. Abnormal labs noted ( gluc >180, HgbA1c = 5.6 on admit), meds reviewed         Current Nutrition Intake & Therapies:    Average Meal Intake: % (per EMR, pt reports only ' bites' of trays)     ADULT DIET; Regular; Low Fat/Low Chol/High Fiber/TATO    Anthropometric Measures:  Height: 165.1 cm (5' 5\")  Ideal Body Weight (IBW): 125 lbs (57 kg)    Admission Body Weight: 88.9 kg (196 lb)  Current Body Weight: 90.3 kg (199 lb), 159.2 % IBW. Weight Source: Bed scale  Current BMI (kg/m2): 33.1  Usual Body Weight: 93 kg (205 lb) ((9/2024) office visit)     % Weight Change (Calculated): -2.9                         Estimated Daily Nutrient Needs:  Energy Requirements Based On: Kcal/kg  Weight Used for Energy Requirements: Current  Energy (kcal/day): 0337-1195 kcals @ 16 kcal/kg  Weight Used for Protein Requirements: Ideal  Protein (g/day): ~70 g protein @ 1.2 g/kg IBW  Method Used for Fluid Requirements: 1 ml/kcal  Fluid (ml/day): ~1500    Nutrition Diagnosis:   Inadequate oral intake 
Critical Troponin 881.2  trending down.    
Daughter at bedside with patient.  
I/O removed RLE.  Tolerated well.  Dressing applied.  
INPATIENT PROGRESS NOTES    PATIENT NAME: Rekha Russ  MRN: [id number]  SERVICE DATE:  2025   SERVICE TIME:  8:00 AM      PRIMARY SERVICE: Pulmonary Disease    CHIEF COMPLAINTS: Lung nodule    INTERVAL HPI: Patient seen and examined at bedside, Interval Notes, orders reviewed. Nursing notes noted    Doing better, no coughing, no chest pain, no shortness of breath, she is currently on room air saturation 95%    New information updated in the note today, rest of the examination did not change compared to yesterday.    Review of system:     GI Abdominal pain No  Skin Rash No    Social History     Tobacco Use    Smoking status: Former     Current packs/day: 0.00     Average packs/day: 0.5 packs/day for 35.0 years (17.5 ttl pk-yrs)     Types: Cigarettes     Start date:      Quit date:      Years since quittin.0    Smokeless tobacco: Never   Substance Use Topics    Alcohol use: No     No family history on file.      OBJECTIVE    Body mass index is 30.95 kg/m².    PHYSICAL EXAM:  Vitals:  BP (!) 133/107   Pulse 84   Temp 98.2 °F (36.8 °C) (Oral)   Resp 18   Ht 1.651 m (5' 5\")   Wt 84.4 kg (186 lb)   LMP  (LMP Unknown)   SpO2 95%   BMI 30.95 kg/m²     General: alert, cooperative, no distress  Head: normocephalic, atraumatic  Eyes:No gross abnormalities.  ENT:  MMM no lesions  Neck:  supple and no masses  Chest : clear to auscultation bilaterally- no wheezes, rales or rhonchi, normal air movement, no respiratory distress and chest wall tenderness present-midsternal  Heart:: Heart sounds are normal.  Regular rate and rhythm without murmur, gallop or rub.  ABD:  symmetric, soft, non-tender  Musculoskeletal : no cyanosis, no clubbing, and no edema  Neuro:  Grossly normal  Skin: No rashes or nodules noted.  Lymph node:  no cervical nodes  Urology: No Madsen   Psychiatric: appropriate    DATA:   No results for input(s): \"WBC\", \"HGB\", \"HCT\", \"MCV\", \"PLT\" in the last 72 hours.    No results 
INPATIENT PROGRESS NOTES    PATIENT NAME: Rekha Russ  MRN: [id number]  SERVICE DATE:  January 7, 2025   SERVICE TIME:  11:37 AM      PRIMARY SERVICE: Pulmonary Disease    CHIEF COMPLAIN: Cardiac arrest      INTERVAL HPI: Patient seen and examined at bedside, Interval Notes, orders reviewed. Nursing notes noted  Patient is comfortable in bed.  Denies having any shortness of breath any chest pain no fever or chills.  No cough or sputum production.  No nausea vomiting diarrhea or abdominal pain.  Waiting for approval for skilled nursing facility.         OBJECTIVE   I/O:24HR INTAKE/OUTPUT:    Intake/Output Summary (Last 24 hours) at 1/7/2025 1137  Last data filed at 1/7/2025 0620  Gross per 24 hour   Intake 1325 ml   Output 700 ml   Net 625 ml     01/06 0701 - 01/07 0700  In: 1325 [P.O.:1325]  Out: 700 [Urine:700]  Body mass index is 33.16 kg/m².    PHYSICAL EXAM:  Vitals:  BP (!) 151/78   Pulse 80   Temp 97.7 °F (36.5 °C) (Axillary)   Resp 18   Ht 1.651 m (5' 5\")   Wt 90.4 kg (199 lb 4.7 oz)   LMP  (LMP Unknown)   SpO2 97%   BMI 33.16 kg/m²     General: Alert, awake .comfortable in bed, No distress.  Head: Atraumatic , Normocephalic   Eyes: PERRL. No sclera icterus. No conjunctival injection. No discharge   ENT: No nasal  discharge. Pharynx clear.  Neck:  Trachea midline. No thyromegaly, no JVD, No cervical adenopathy.  Chest : Bilaterally symmetrical ,Normal effort,  No accessory muscle use  Lung : Diminished breath sound bilaterally No Rales. No wheezing. No rhonchi.   Heart:: Normal rate. Regular rhythm. No mumur ,  Rub or gallop  ABD: Non-tender. Non-distended. No masses. No organmegaly. Normal bowel sounds. No hernia.  Ext : No Pitting both leg , No Cyanosis No clubbing  Neuro: no focal weakness    Labs:  CBC:   Recent Labs     01/05/25  0902   WBC 6.0   HGB 10.4*   HCT 30.3*        BMP:    Recent Labs     01/05/25  0902      K 3.9      CO2 25   BUN 14   CREATININE 0.63 
Lt. Groin insertion site frequently assessed.  No breakthrough bleeding on dressing observed.  No hematoma observed at or surrounding site.  Patient has c/o mid, upper chest pain at sternum area.  Patient relates that area is aching and sore from CPR.  Morphine prn given which was helpful in reducing pain to area.  
[facility] OCCUPATIONAL THERAPY EVALUATION - ACUTE     NAME: Rekha Russ  : 1955 (69 y.o.)  MRN: [id number]  CODE STATUS: Full Code  Room: W185/W185-01    Date of Service: 2024    Patient Diagnosis(es): STEMI (ST elevation myocardial infarction) ([facility]) [I21.3]  ST elevation myocardial infarction (STEMI), unspecified artery ([facility]) [I21.3]  Cardiopulmonary arrest [I46.9]   Patient Active Problem List    Diagnosis Date Noted    Cardiopulmonary arrest 2024    NSTEMI (non-ST elevated myocardial infarction) ([facility]) 2024    Pulmonary embolism, unspecified chronicity, unspecified pulmonary embolism type, unspecified whether acute cor pulmonale present ([facility]) 2024    Acute abdominal pain 10/02/2024    Abdominal bloating 10/02/2024    Constipation 10/02/2024    Corn 10/02/2024    Nausea 10/02/2024    Chest pressure 10/02/2024    Arm numbness left 10/02/2024    Dysfunction of right eustachian tube 2024    Cerumen debris on tympanic membrane of both ears 2024    Gastroesophageal reflux disease without esophagitis 2023    Chronic kidney disease, stage III (moderate) ([facility]) 2023    Morbid obesity 2023    Prediabetes 2023    Hypercalcemia 2022    Hyperparathyroidism ([facility]) 2022    Anemia 2021        Past Medical History:   Diagnosis Date    Hypertension     Trigeminal neuralgia pain      Past Surgical History:   Procedure Laterality Date    CARDIAC PROCEDURE N/A 2024    Left heart cath / coronary angiography / possible percutaneous coronary intervention. performed by [facility] DO [facility]    CARDIAC PROCEDURE N/A 2024    Percutaneous coronary intervention performed by Bobby Franco DO at [facility] CARD[facility]ATH LAB    HYSTERECTOMY (CERVIX STATUS UNKNOWN)      TONSILLECTOMY          Restrictions  Restrictions/Precautions: Fall Risk                 Safety Devices: Safety Devices  Type of Devices: All fall risk 
Patient received to ICU Bed 15 at this time. Report received from Cath lab nurse.  Patient oriented to room and call system.  Patient maintained in supine/flat position post cardiac catheterization.  VSS.  NSR on bedside telemetry.  See flow sheets.  
Physical Therapy  Facility/Department: [facility] MED SURG W185/W185-01  Physical Therapy Discharge      NAME: Rekha Russ    : 1955 (69 y.o.)  MRN: [id number]    Account: [id number]  Gender: female      Patient has been discharged from acute care hospital. DC patient from current PT program.      Electronically signed by Stacy Cantu PT on 1/10/25 at 1:57 PM EST    
Physical Therapy  Physical Therapy Missed Treatment   Facility/Department: [facility] MED SURG W185/W185-01    NAME: Rekha Russ    : 1955 (69 y.o.)  MRN: [id number]    Account: [id number]  Gender: female    Chart reviewed, attempted PT at 1041. Patient unavailable 2° to:    [] Hold per nsg request    [x] Pt declined  going home at 1 PM  [] Pt.. off floor for test/procedure.     [] Pt. Unavailable     Will attempt PT treatment again at earliest convenience.      Electronically signed by Dang Chang PTA on 25 at 10:41 AM EST    
Physical Therapy Med Surg Daily Treatment Note  [facility] [facility]  Room: [place] [id number]       NAME: Rekha Russ  : 1955 (69 y.o.)  MRN: [id number]  CODE STATUS: Full Code    Date of Service: 2025    Patient Diagnosis(es): STEMI (ST elevation myocardial infarction) ([facility]) [I21.3]  ST elevation myocardial infarction (STEMI), unspecified artery ([facility]) [I21.3]  Cardiopulmonary arrest [I46.9]   Chief Complaint   Patient presents with    Cardiac Arrest     Patient Active Problem List    Diagnosis Date Noted    Cardiopulmonary arrest 2024    ST elevation myocardial infarction (STEMI) ([facility]) 2024    Pulmonary embolism, unspecified chronicity, unspecified pulmonary embolism type, unspecified whether acute cor pulmonale present ([facility]) 2024    Acute abdominal pain 10/02/2024    Abdominal bloating 10/02/2024    Constipation 10/02/2024    Corn 10/02/2024    Nausea 10/02/2024    Chest pressure 10/02/2024    Arm numbness left 10/02/2024    Dysfunction of right eustachian tube 2024    Cerumen debris on tympanic membrane of both ears 2024    Gastroesophageal reflux disease without esophagitis 2023    Chronic kidney disease, stage III (moderate) ([facility]) 2023    Morbid obesity 2023    Prediabetes 2023    Hypercalcemia 2022    Hyperparathyroidism ([facility]) 2022    Anemia 2021        Past Medical History:   Diagnosis Date    Hypertension     Trigeminal neuralgia pain      Past Surgical History:   Procedure Laterality Date    CARDIAC PROCEDURE N/A 2024    Left heart cath / coronary angiography / possible percutaneous coronary intervention. performed by Bobby Franco DO at [facility]    CARDIAC PROCEDURE N/A 2024    Percutaneous coronary intervention performed by Bobby Franco DO at [facility]    CARDIAC PROCEDURE N/A 2024    Left heart cath / coronary angiography performed by Bobby Franco, 
Physical Therapy Med Surg Daily Treatment Note  [place] [facility]  Room: [place] [id number]       NAME: Rekha Russ  : 1955 (69 y.o.)  MRN: [id number]  CODE STATUS: Full Code    Date of Service: 2025    Patient Diagnosis(es): STEMI (ST elevation myocardial infarction) ([facility]) [I21.3]  ST elevation myocardial infarction (STEMI), unspecified artery ([facility]) [I21.3]  Cardiopulmonary arrest [I46.9]   Chief Complaint   Patient presents with    Cardiac Arrest     Patient Active Problem List    Diagnosis Date Noted    Cardiopulmonary arrest 2024    ST elevation myocardial infarction (STEMI) ([facility]) 2024    Pulmonary embolism, unspecified chronicity, unspecified pulmonary embolism type, unspecified whether acute cor pulmonale present ([facility]) 2024    Acute abdominal pain 10/02/2024    Abdominal bloating 10/02/2024    Constipation 10/02/2024    Corn 10/02/2024    Nausea 10/02/2024    Chest pressure 10/02/2024    Arm numbness left 10/02/2024    Dysfunction of right eustachian tube 2024    Cerumen debris on tympanic membrane of both ears 2024    Gastroesophageal reflux disease without esophagitis 2023    Chronic kidney disease, stage III (moderate) ([facility]) 2023    Morbid obesity 2023    Prediabetes 2023    Hypercalcemia 2022    Hyperparathyroidism ([facility]) 2022    Anemia 2021        Past Medical History:   Diagnosis Date    Hypertension     Trigeminal neuralgia pain      Past Surgical History:   Procedure Laterality Date    CARDIAC PROCEDURE N/A 2024    Left heart cath / coronary angiography / possible percutaneous coronary intervention. performed by Bobby Franco DO [facility]    CARDIAC PROCEDURE N/A 2024    Percutaneous coronary intervention performed by Bobby Franco DO at [facility]    HYSTERECTOMY (CERVIX STATUS UNKNOWN)      TONSILLECTOMY         Chart Reviewed: Yes  Family/Caregiver 
Physical Therapy Med Surg Daily Treatment Note  Facility/Department: [facility] TELEMETRY  Room: [place] [id number]       NAME: Rekha Russ  : 1955 (69 y.o.)  MRN: [id number]  CODE STATUS: Full Code    Date of Service: 1/3/2025    Patient Diagnosis(es): STEMI (ST elevation myocardial infarction) ([facility]) [I21.3]  ST elevation myocardial infarction (STEMI), unspecified artery ([facility]) [I21.3]  Cardiopulmonary arrest [I46.9]   Chief Complaint   Patient presents with    Cardiac Arrest     Patient Active Problem List    Diagnosis Date Noted    Cardiopulmonary arrest 2024    ST elevation myocardial infarction (STEMI) ([facility]) 2024    Pulmonary embolism, unspecified chronicity, unspecified pulmonary embolism type, unspecified whether acute cor pulmonale present ([facility]) 2024    Acute abdominal pain 10/02/2024    Abdominal bloating 10/02/2024    Constipation 10/02/2024    Corn 10/02/2024    Nausea 10/02/2024    Chest pressure 10/02/2024    Arm numbness left 10/02/2024    Dysfunction of right eustachian tube 2024    Cerumen debris on tympanic membrane of both ears 2024    Gastroesophageal reflux disease without esophagitis 2023    Chronic kidney disease, stage III (moderate) ([facility]) 2023    Morbid obesity 2023    Prediabetes 2023    Hypercalcemia 2022    Hyperparathyroidism ([facility]) 2022    Anemia 2021        Past Medical History:   Diagnosis Date    Hypertension     Trigeminal neuralgia pain      Past Surgical History:   Procedure Laterality Date    CARDIAC PROCEDURE N/A 2024    Left heart cath / coronary angiography / possible percutaneous coronary intervention. performed by oBbby Franco DO at [facility] CARDIAC CATH LAB    CARDIAC PROCEDURE N/A 2024    Percutaneous coronary intervention performed by Bobby Franco DO at [facility] CARDIAC CATH LAB    HYSTERECTOMY (CERVIX STATUS UNKNOWN)      TONSILLECTOMY         Chart Reviewed: Yes  Family/Caregiver 
Physical Therapy Med Surg Daily Treatment Note  Facility/Department: [facility] TELEMETRY  Room: [place] [id number]       NAME: Rekha Russ  : 1955 (69 y.o.)  MRN: [id number]  CODE STATUS: Full Code    Date of Service: 2025    Patient Diagnosis(es): STEMI (ST elevation myocardial infarction) ([facility]) [I21.3]  ST elevation myocardial infarction (STEMI), unspecified artery ([facility]) [I21.3]  Cardiopulmonary arrest [I46.9]   Chief Complaint   Patient presents with    Cardiac Arrest     Patient Active Problem List    Diagnosis Date Noted    Cardiopulmonary arrest 2024    ST elevation myocardial infarction (STEMI) ([facility]) 2024    Pulmonary embolism, unspecified chronicity, unspecified pulmonary embolism type, unspecified whether acute cor pulmonale present ([facility]) 2024    Acute abdominal pain 10/02/2024    Abdominal bloating 10/02/2024    Constipation 10/02/2024    Corn 10/02/2024    Nausea 10/02/2024    Chest pressure 10/02/2024    Arm numbness left 10/02/2024    Dysfunction of right eustachian tube 2024    Cerumen debris on tympanic membrane of both ears 2024    Gastroesophageal reflux disease without esophagitis 2023    Chronic kidney disease, stage III (moderate) ([facility]) 2023    Morbid obesity 2023    Prediabetes 2023    Hypercalcemia 2022    Hyperparathyroidism ([facility]) 2022    Anemia 2021        Past Medical History:   Diagnosis Date    Hypertension     Trigeminal neuralgia pain      Past Surgical History:   Procedure Laterality Date    CARDIAC PROCEDURE N/A 2024    Left heart cath / coronary angiography / possible percutaneous coronary intervention. performed by Bobby Franco DO at [facility] CARDIAC CATH LAB    CARDIAC PROCEDURE N/A 2024    Percutaneous coronary intervention performed by Bobby Franco DO at [facility] CARDIAC CATH LAB    HYSTERECTOMY (CERVIX STATUS UNKNOWN)      TONSILLECTOMY         Chart Reviewed: Yes  Family/Caregiver 
Report called to 1W RN. Pt placed on tele  
Report received from Terry LUNDY. Pt resting with even and unlabored respirations on 2LNC.  
Spiritual Health History and Assessment/Progress Note  [facility]    (P) Initial Encounter,  ,  ,      Name: Rekha Russ MRN: [id number]    Age: 69 y.o.     Sex: female   Language: English   Oriental orthodox: Cheondoism   Cardiopulmonary arrest     Date: 12/30/2024            Total Time Calculated: (P) 15 min              Spiritual Assessment [facility] in [facility]            Encounter Overview/Reason: (P) Initial Encounter  Service Provided For: (P) Patient     reports, patient is alert, awake, reports discomforts and pain from cheat compressions, patient was cardiac arrest, coping and thankful to be alive.      offered words of comfort, wellness, rest, strength, and hope    Gina, Belief, Meaning:   Patient identifies as spiritual, is connected with a gina tradition or spiritual practice, and has beliefs or practices that help with coping during difficult times  Family/Friends No family/friends present      Importance and Influence:  Patient has spiritual/personal beliefs that influence decisions regarding their health  Family/Friends No family/friends present    Community:  Patient is connected with a spiritual community  Family/Friends No family/friends present    Assessment and Plan of Care:     Patient Interventions include: Facilitated expression of thoughts and feelings and Explored spiritual coping/struggle/distress  Family/Friends Interventions include: No family/friends present    Patient Plan of Care: No spiritual needs identified for follow-up  Family/Friends Plan of Care: No family/friends present    Electronically signed by Chaplain Tata on 12/30/2024 at 4:00 PM   
01/05/25  0902   WBC 6.0   HGB 10.4*   HCT 30.3*   MCV 93.2        Recent Labs     01/05/25  0902      K 3.9      CO2 25   BUN 14   CREATININE 0.63   GLUCOSE 183*   CALCIUM 10.1*   LABGLOM >90.0       MV Settings:          No results for input(s): \"PHART\", \"NAT0BYV\", \"PO2ART\", \"FQI4UCP\", \"BEART\", \"J2HWZMXF\" in the last 72 hours.    O2 Device: Nasal cannula  O2 Flow Rate (L/min): 0 L/min    ADULT DIET; Regular; No Concentrated sweets  ADULT ORAL NUTRITION SUPPLEMENT; Breakfast, Dinner; Diabetic Oral Supplement     MEDICATIONS during current hospitalization:    Continuous Infusions:    Scheduled Meds:   polyethylene glycol  17 g Oral Daily    losartan  100 mg Oral Daily    famotidine  20 mg Oral BID    metoprolol succinate  50 mg Oral Daily    OXcarbazepine  300 mg Oral BID    pantoprazole  40 mg Oral Daily    aspirin  81 mg Oral Daily    ticagrelor  90 mg Oral BID    heparin (porcine)  5,000 Units SubCUTAneous BID    atorvastatin  40 mg Oral Nightly       PRN Meds:sodium phosphate, acetaminophen, ondansetron, nitroGLYCERIN, morphine, hydrALAZINE, labetalol, traMADol    Radiology  Chest x-ray films reviewed by me no infiltrate        IMPRESSION AND SUGGESTION:  Patient is at risk due to   Lung nodule  Possible sleep-related breathing disorder  Chest tenderness, post CPR    postcardiac arrest, 5 minutes with ROSC no neurologic deficit  Obesity      Recommendation  Continue cardioprotective medication  Sleep study as outpatient  Lung nodule follow-up as outpatient               Electronically signed by Hayley Gaspar MD,  [facility]   on 1/8/2025 at 7:45 AM    
40 mg, Nightly         Recent Labs     01/02/25  0537   HGB 9.9*   WBC 6.7         K 4.1   CO2 24   BUN 14     Cr 0.70    Telemetry: SR HR 80s    2D Echo:   12/29/24    ECHO (TTE) LIMITED (PRN CONTRAST/BUBBLE/STRAIN/3D) 12/30/2024  1:27 PM (Final)    Interpretation Summary    Left Ventricle: The EF by visual approximation is 55 - 60%. Mildly increased wall thickness. Normal wall motion. Diastolic dysfunction present with normal LV EF.    Image quality is adequate. Contrast used: Definity. Limited 2D and color flow study.    Signed by: Janette Phelps DO on 12/30/2024  1:27 PM      Left heart cath / coronary angiography, Left heart cath / coronary angiography 12/26/2024  Conclusion    Status post accessible PCI of the proximal to mid LAD and PTCA of the ostial diagonal 1 branch    LV ejection fraction of 40%.  Anterior wall hypokinesis  Left main large-caliber vessel, short.  No stenosis  LAD large caliber vessel.  Mid 80% at diagonal 1.  Diagonal 1 is a moderate caliber vessel with 99% ostial stenosis  Circumflex is a large-caliber vessel, mild diffuse disease  RCA large-caliber vessel, dominant.  Mild diffuse disease      Impressions:  Cardiac arrest on 12/29/2024 while patient in rehab at [facility] ROSC achieved after 5 minutes, after ROSC patient was found with STEMI and patient was transferred to [facility] as code purple  STEMI PCI to proximal LAD EFRAIN x 2 on 12/29/2024  History of CAD PCI to the proximal to mid LAD and PTCA of ostial diagonal 1 branch on December 27, 2024  Heart failure with preserved ejection fraction  Morbid obesity, BMI 33.05  Hypertension  HLD       Recommendations:  Continue aspirin and atorvastatin  Continue Brilinta  Continue metoprolol and losartan  Await rehab/SNF placement.       Thank you for allowing me to participate in your patient's cardiac care. Should you have questions, please do not hesitate to contact me.     Electronically signed by Kemal Addison MD on 
Mildly increased wall thickness. Normal wall motion. Diastolic dysfunction present with normal LV EF.    Image quality is adequate. Contrast used: Definity. Limited 2D and color flow study.    Signed by: Janette Phelps DO on 12/30/2024  1:27 PM      Left heart cath / coronary angiography, Left heart cath / coronary angiography 12/26/2024  Conclusion    Status post accessible PCI of the proximal to mid LAD and PTCA of the ostial diagonal 1 branch    LV ejection fraction of 40%.  Anterior wall hypokinesis  Left main large-caliber vessel, short.  No stenosis  LAD large caliber vessel.  Mid 80% at diagonal 1.  Diagonal 1 is a moderate caliber vessel with 99% ostial stenosis  Circumflex is a large-caliber vessel, mild diffuse disease  RCA large-caliber vessel, dominant.  Mild diffuse disease      Impressions:  Aborted cardiopulmonary arrest  Acute ST elevation MI-resolved.  Status post PCI of the proximal LAD with drug-eluting stents x 2  Stable CAD- recent PCI of the proximal to mid LAD as well as PTCA of the ostial diagonal 1 on 12/26/2024.  Also status post recent PTCA of the diagonal 1 at [facility] 10/04/2024  Chronic HFpEF   Morbid obesity, BMI 33.05  Hypertension  HLD       Recommendations:  Continue aspirin and Brilinta.  Continue statin, beta-blocker, and ARB. Increase Losartan to 100mg by mouth daily   PT/OT  Await discharge planning.  Plan for rehab hopefully.   Cardiac status otherwise stable for discharge.         Thank you for allowing me to participate in your patient's cardiac care. Should you have questions, please do not hesitate to contact me.     Janette Phelps DO, FACC, FACOI  [facility]     
and sensory grossly normal bilaterally  Musculoskeletal - no joint tenderness, deformity or swelling  Extremities - peripheral pulses normal, no pedal edema, no clubbing or cyanosis  Skin - normal coloration and turgor, no rashes, no suspicious skin lesions noted              BMP:    Recent Labs     01/01/25  0558 01/02/25  0537    138   K 4.1 4.1    104   CO2 26 24   BUN 14 14   CREATININE 0.70 0.58   GLUCOSE 145* 154*    .  MG:3,PHOS:3)@  Ionized Calcium: No components found for: \"IONCA\"  CBC:   Recent Labs     01/01/25  0558 01/02/25  0537   WBC 6.8 6.7   HGB 9.9* 9.9*    187      ABG: No results for input(s): \"PH\", \"PCO2\", \"PO2\" in the last 72 hours.        Assessment and Plan:     Impression:    -Acute coronary syndrome status post cardiac cath with drug-eluting stents x 2.  -Hypoxia. On RA now. Has more nocturnal component of hypoxia.  -Accelerated hypertension.  -Hyperglycemia  -Leukocytosis.  Likely reactive.  -Lung nodule measuring 5 mm.  -Nocturnal hypoxia and likely sleep disordered breathing.    Recommendations:    -Continue cardiac care as per cardiology.  - observe off of O2  -Blood pressure control.  -Tight glucose control.  -Antiplatelets and statins.  -Follow-up on the lung nodule as an outpatient.  -She will need sleep study as an outpatient.  Will arrange for.     Electronically signed by Lisa Barbosa MD on 1/3/2025 at 3:17 PM            
normal bilaterally  Musculoskeletal - no joint tenderness, deformity or swelling  Extremities - peripheral pulses normal, no pedal edema, no clubbing or cyanosis  Skin - normal coloration and turgor, no rashes, no suspicious skin lesions noted              BMP:    Recent Labs     12/31/24  0628 01/01/25  0558 01/02/25  0537    140 138   K 4.4 4.1 4.1    104 104   CO2 25 26 24   BUN 15 14 14   CREATININE 0.64 0.70 0.58   GLUCOSE 162* 145* 154*    .  MG:3,PHOS:3)@  Ionized Calcium: No components found for: \"IONCA\"  CBC:   Recent Labs     01/01/25  0558 01/02/25  0537   WBC 6.8 6.7   HGB 9.9* 9.9*    187      ABG: No results for input(s): \"PH\", \"PCO2\", \"PO2\" in the last 72 hours.        Assessment and Plan:     Impression:    -Acute coronary syndrome status post cardiac cath with drug-eluting stents x 2.  -Hypoxia. On RA now. Has more nocturnal component of hypoxia.  -Accelerated hypertension.  -Hyperglycemia  -Leukocytosis.  Likely reactive.  -Lung nodule measuring 5 mm.  -Nocturnal hypoxia and likely sleep disordered breathing.    Recommendations:    -Continue cardiac care as per cardiology.  - observe off of O2  -Blood pressure control.  -Tight glucose control.  -Antiplatelets and statins.  -Follow-up on the lung nodule as an outpatient.  -She will need sleep study as an outpatient.  Will arrange for.     Electronically signed by Lisa Barbosa MD on 1/2/2025 at 8:08 PM            
services?: Yes  Family/Caregiver Present: Yes (family)    Restrictions:  Restrictions/Precautions: Fall Risk     SUBJECTIVE: \"I am an anxious person\"  Pain   Denies pain       Prior Level of Function:  Social/Functional History  Lives With: Spouse  Type of Home: Trailer  Home Layout: One level  Home Access: Ramped entrance, Stairs to enter with rails  Entrance Stairs - Number of Steps: 5  Entrance Stairs - Rails: Both  Bathroom Shower/Tub: Walk-in shower  Bathroom Equipment: Grab bars in shower, Hand-held shower  Home Equipment: Walker - Rolling  Has the patient had two or more falls in the past year or any fall with injury in the past year?: No  Prior Level of Assist for ADLs: Independent  Prior Level of Assist for Homemaking: Independent  Homemaking Responsibilities: Yes  Prior Level of Assist for Ambulation: Independent household ambulator, with or without device  Prior Level of Assist for Transfers: Independent  Active : No    OBJECTIVE:   Vision  Vision: Within Functional Limits  Hearing: Within functional limits    Cognition:  Orientation Level: Oriented to place, Oriented to situation, Oriented to person  Follows Commands: Within Functional Limits  Cognition Comment: follows commands consistently with mild hesitation    Observation/Palpation  Posture: Fair  Observation: pt seated in bedside chair upon arrival to pt room; pt states she was getting tired in chair- PT assisted to bed    ROM:  RLE AROM: WFL  LLE AROM : WFL    Strength:  Strength RLE  Comment: grossly 4/5  Strength LLE  Comment: grossly 4/5    Neuro:  Balance  Sitting - Static: Good;-  Sitting - Dynamic: Fair;+  Standing - Static: Fair;-  Standing - Dynamic: Fair;-    Sensation: Intact    Bed mobility  Supine to Sit:  (NT- pt up in bedside chair upon arrival to pt room)  Sit to Supine: Moderate assistance (lifting assistance for LEs)    Transfers  Sit to Stand: Stand by assistance  Stand to Sit: Stand by assistance  Comment: no 
1530       Time Out 1555         Minutes 25           ADL/IADL trainin minutes    Electronically signed by:    Raiza Collins OT    1/3/2025, 4:15 PM   
2024  Heart failure with preserved ejection fraction  Morbid obesity, BMI 33.05  Hypertension  HLD       Recommendations:  Continue aspirin and atorvastatin  Continue Brilinta  Continue metoprolol and losartan  Rehab/SNF precert pending.  Follow up with Dr. Franco or I post discharge.         Thank you for allowing me to participate in your patient's cardiac care. Should you have questions, please do not hesitate to contact me.     Electronically signed by Bobby Franco DO on 1/7/2025 at 3:31 PM    Attending Supervising Physician's Attestation Statement  The patient is a 69 y.o. female. I have performed a history and physical examination of the patient. I discussed the case with the nurse practitioner.    I reviewed the patient's Past Medical History, Past Surgical History, Medications, and Allergies.     Physical Exam:  Vitals:    01/07/25 0753 01/07/25 1124 01/07/25 1224 01/07/25 1523   BP: (!) 169/64 (!) 151/78 (!) 158/52 (!) 136/55   Pulse: 89 80 77 76   Resp: 18 18 18 18   Temp: 98.2 °F (36.8 °C) 97.7 °F (36.5 °C) 98.6 °F (37 °C) 99 °F (37.2 °C)   TempSrc: Axillary Axillary Oral Axillary   SpO2: 97% 97% 98% 100%   Weight:       Height:           Review of Systems - Respiratory ROS: no cough, shortness of breath, or wheezing  Cardiovascular ROS: no chest pain or dyspnea on exertion  Gastrointestinal ROS: no abdominal pain, change in bowel habits, or black or bloody stools    Pulmonary/Chest: clear to auscultation bilaterally- no wheezes, rales or rhonchi, normal air movement, no respiratory distress  Cardiovascular: normal rate, normal S1 and S2, no gallops, intact distal pulses, and no carotid bruits  Abdomen: soft, non-tender, non-distended, normal bowel sounds, no masses or organomegaly    Active Hospital Problems    Diagnosis Date Noted    Cardiopulmonary arrest [I46.9] 12/29/2024     Priority: High    ST elevation myocardial infarction (STEMI) ([facility]) [I21.3] 12/26/2024     Priority: High        I reviewed 
50 MG tablet Take 1 tablet by mouth daily        nitroGLYCERIN (NITROSTAT) 0.4 MG SL tablet Place 1 tablet under the tongue every 5 minutes as needed        pantoprazole (PROTONIX) 40 MG tablet Take 1 tablet by mouth daily        OXcarbazepine (TRILEPTAL) 150 MG tablet TAKE 2 TABLETS BY MOUTH TWICE DAILY                ALLERGIES: Bactrim [sulfamethoxazole-trimethoprim] and Seasonal     Review of Systems   Constitutional: Negative.  Negative for chills and fever.   HENT: Negative.     Eyes: Negative.    Respiratory:  Negative for shortness of breath and wheezing.    Cardiovascular:  Positive for chest pain. Negative for palpitations and leg swelling.   Gastrointestinal: Negative.  Negative for abdominal pain, nausea and vomiting.   Endocrine: Negative.    Genitourinary: Negative.    Musculoskeletal: Negative.    Skin: Negative.  Negative for rash.   Allergic/Immunologic: Negative.    Neurological:  Positive for syncope. Negative for dizziness, weakness and headaches.   Hematological: Negative.    Psychiatric/Behavioral: Negative.              VITALS:  Pulse (!) 103, weight 89 kg (196 lb 3.2 oz), SpO2 93%, not currently breastfeeding.  Body mass index is 32.65 kg/m².     Physical Exam  Vitals and nursing note reviewed.   Constitutional:       Appearance: She is well-developed. She is not diaphoretic.   HENT:      Head: Normocephalic and atraumatic.   Eyes:      Pupils: Pupils are equal, round, and reactive to light.   Neck:      Thyroid: No thyromegaly.      Vascular: No JVD.      Trachea: No tracheal deviation.   Cardiovascular:      Rate and Rhythm: Normal rate and regular rhythm.      Chest Wall: PMI is not displaced.      Pulses: Intact distal pulses.      Heart sounds: Normal heart sounds. Heart sounds not distant. No murmur heard.     No friction rub. No gallop. No S3 sounds.   Pulmonary:      Effort: No respiratory distress.      Breath sounds: No wheezing or rales.   Chest:      Chest wall: No tenderness. 
MICHELLE. Pt would benefit from continued Occupational Therapy to increase strength, activity tolerance, [place] with functional transfers, and [place] with ADL/IADL completion.      Performance Deficits/Impairments:   Decreased High-level IADLs  Decreased ADL Status  Decreased Functional Mobility  Decreased Balance  Decreased Strength  Decreased Endurance  Decreased Posture  Decreased Safety Awareness     [employer]  How much help is needed for putting on and taking off regular lower body clothing?: A Little  How much help is needed for bathing (which includes washing, rinsing, drying)?: A Little  How much help is needed for toileting (which includes using toilet, bedpan, or urinal)?: A Little  How much help is needed for putting on and taking off regular upper body clothing?: A Little  How much help is needed for taking care of personal grooming?: A Little  How much help for eating meals?: None  AM-[facility] Inpatient Daily Activity Raw Score: 19  AM-PAC Inpatient ADL T-Scale Score : 40.22  ADL Inpatient CMS 0-100% Score: 42.8  ADL Inpatient CMS G-Code Modifier : CK    Plan:  Continue OT per POC    Patient Education:  Education Given To: Patient  Education Provided: Role of Therapy;Transfer Training  Education Provided Comments: BUE Exercises using 1# dumbbell  Education Method: Verbal;Other ;Demonstration (Visual Cues)  Barriers to Learning: None  Education Outcome: Verbalized understanding;Demonstrated understanding;Continued education needed    Equipment recommendations:  Continue to assess pending progress.     Goals/Plan of care addressed during this session:  Patient Goal: Patient goals : Not stated at this time  Improve Strength, Improve [place] with ADLs, Improve [place] with Functional Transfers, Improve Cognition/Safety awareness, and Improve Endurance    Therapy Time:   Individual Group Co-Treat   Time In 0941       Time Out 0959         Minutes 18         ADL/IADL trainin 
Term Goal 3: Pt will demonstrate amb >/= 100ft mod indep with safest AD    PLAN    General Plan: 1 time a day 3-6 times a week  Safety Devices  Type of Devices: All fall risk precautions in place, Call light within reach, Chair alarm in place, Nurse notified, Left in chair     AMPAC (6 CLICK) BASIC MOBILITY  AM-PAC Inpatient Mobility Raw Score : 18   Therapy Time   Individual   Time In 1305   Time Out 1340   Minutes 35     Gt 15  Tr 15  LUZ 5   Marta Duke PTA, 01/02/25 at 2:55 PM         Definitions for assistance levels  Independent = pt does not require any physical supervision or assistance from another person for activity completion. Device may be needed.  Stand by assistance = pt requires verbal cues or instructions from another person, close to but not touching, to perform the activity  Minimal assistance= pt performs 75% or more of the activity; assistance is required to complete the activity  Moderate assistance= pt performs 50% of the activity; assistance is required to complete the activity  Maximal assistance = pt performs 25% of the activity; assistance is required to complete the activity  Dependent = pt requires total physical assistance to accomplish the task  
pulmonary artery into the lingula as seen on axial image 104 the correlates with sagittal image 75, coronal image 85 region Mediastinum: No evidence of mediastinal lymphadenopathy.  The heart and pericardium demonstrate no acute abnormality. At a post tissue prominence at the paraspinal region noted uncertain significance.  Cardiomegaly.  Coronary calcifications.  No clear right heart strain Lungs/Pleura: The lungs are without acute process.  No focal consolidation or pulmonary edema.  No evidence of pleural effusion or pneumothorax. Minimal atelectasis/scar mid to lower lungs most evident anteriorly.  Left lower lobe 5 mm nodule on image 59. Upper Abdomen: Small left adrenal nodule with HU of 24 measuring 1.5 cm. Recommend follow-up adrenal washout CT in 1 year. If stable for greater than or equal to 1 year, no further follow-up imaging. Soft Tissues/Bones: No acute bone or soft tissue abnormality.     Suspicious for a small acute pulmonary embolus of proximal lingula segment. The findings were sent to the Radiology Results Communication Center at 7:15 pm on 12/25/2024 to be communicated to a licensed caregiver.     XR ACUTE ABD SERIES CHEST 1 VW    Result Date: 12/25/2024  EXAMINATION: TWO XRAY VIEWS OF THE ABDOMEN AND SINGLE  XRAY VIEW OF THE CHEST 12/25/2024 3:22 pm COMPARISON: 12/24/2024 HISTORY: ORDERING SYSTEM PROVIDED HISTORY: abd  pain TECHNOLOGIST PROVIDED HISTORY: Reason for exam:->abd  pain What reading provider will be dictating this exam?->CRC FINDINGS: PA view of the chest demonstrates satisfactory expansion lungs which are clear.  The cardiac silhouette appears unremarkable.  There is no evidence of a pneumothorax. Views of the abdomen demonstrate a moderate amount of gas and stool throughout the colon.  There is a solitary calcification over the right upper quadrant measuring 1 cm in size consistent with the a solitary gallstone.     1. No acute cardiopulmonary disease. 2. Moderate amount of gas and 
joint effusion.       MACRO: None.    Signed by: Evan Finkelstein 12/2/2024 12:59 AM Dictation workstation:   [id number]        EKG: Sinus rhythm, ST elevations in anterior leads        2D Echo:      No results found for this or any previous visit.        Stress Test:      No results found for this or any previous visit.  No results found for this or any previous visit from the past 365 days.        Left heart cath / coronary angiography, Left heart cath / coronary angiography 12/26/2024     Conclusion    Status post accessible PCI of the proximal to mid LAD and PTCA of the ostial diagonal 1 branch        LV ejection fraction of 40%.  Anterior wall hypokinesis  Left main large-caliber vessel, short.  No stenosis  LAD large caliber vessel.  Mid 80% at diagonal 1.  Diagonal 1 is a moderate caliber vessel with 99% ostial stenosis  Circumflex is a large-caliber vessel, mild diffuse disease  RCA large-caliber vessel, dominant.  Mild diffuse disease              ASSESSMENT:       Cardiopulmonary rest  ST elevation MI-resolved.  Status post PCI of the proximal LAD with drug-eluting stents x 2  CAD- recent PCI of the proximal to mid LAD as well as PTCA of the ostial diagonal 1 on 12/26/2024.  Also status post recent PTCA of the diagonal 1 at [facility] 10/04/2024  HFpEF -- per Echo on 12/2024  Morbid obesity, BMI 33.05  Hypertension  HLD         PLAN:   As always, aggressive risk factor modification is strongly recommended. We should adhere to the JNC VIII guidelines for HTN management and the NCEPATP III guidelines for LDL-C management.  Continue with Aspirin 81 mg once daily, Lipitor 40 mg once daily, losartan 50 mg once daily, metoprolol succinate 50 mg once daily, and Brilinta 90 mg once daily  Continue to monitor on tele for any arrhythmias   Hemoglobin A1C was checked to rule out T2DM and was within normal limits.   PT/OT talat pending  Outpatient follow up with Dr. Franco after discharge.   Further

## 2025-01-10 NOTE — PROGRESS NOTES
Discharge Facility: [facility]  Discharge Diagnosis: STEMI  Admission Date: 12/29/2024 (readmit)  Discharge Date: 1/9/2025    Pt was admitted to [facility] 12/26-12/28/2024 for chest pain.    PCP Appointment Date:  -1/13/2025 1300    Specialist Appointment Date:   -1/22/2025 [address] [place] [address] Cardiology    Hospital Encounter and Summary Linked: Yes    Pt has PCP follow up appt within 2 business days of discharge. No outreach call made at this time.

## 2025-01-13 ENCOUNTER — APPOINTMENT (OUTPATIENT)
Dept: PRIMARY CARE | Facility: CLINIC | Age: 70
End: 2025-01-13
Payer: MEDICARE

## 2025-01-14 ENCOUNTER — PATIENT OUTREACH (OUTPATIENT)
Dept: PRIMARY CARE | Facility: CLINIC | Age: 70
End: 2025-01-14
Payer: MEDICARE

## 2025-01-20 ENCOUNTER — TELEPHONE (OUTPATIENT)
Dept: PRIMARY CARE | Facility: CLINIC | Age: 70
End: 2025-01-20
Payer: MEDICARE

## 2025-01-20 NOTE — TELEPHONE ENCOUNTER
Patricia, PT with [facility] called, they started her home care PT today. They will see her twice a week for three weeks and once a week for one week    Patricia: [phone]

## 2025-01-28 ENCOUNTER — HOSPITAL ENCOUNTER (INPATIENT)
Age: 70
LOS: 3 days | Discharge: HOME HEALTH | DRG: 291 | End: 2025-01-31
Attending: EMERGENCY MEDICINE | Admitting: INTERNAL MEDICINE
Payer: MEDICARE

## 2025-01-28 ENCOUNTER — APPOINTMENT (OUTPATIENT)
Dept: CT IMAGING | Age: 70
DRG: 291 | End: 2025-01-28
Payer: MEDICARE

## 2025-01-28 DIAGNOSIS — R06.00 DYSPNEA, UNSPECIFIED TYPE: ICD-10-CM

## 2025-01-28 DIAGNOSIS — J18.9 PNEUMONIA OF LEFT LOWER LOBE DUE TO INFECTIOUS ORGANISM: Primary | ICD-10-CM

## 2025-01-28 LAB
ALBUMIN SERPL-MCNC: 3.8 G/DL (ref 3.5–4.6)
ALP SERPL-CCNC: 149 U/L (ref 40–130)
ALT SERPL-CCNC: 14 U/L (ref 0–33)
ANION GAP SERPL CALCULATED.3IONS-SCNC: 14 MEQ/L (ref 9–15)
AST SERPL-CCNC: 17 U/L (ref 0–35)
BASOPHILS # BLD: 0 K/UL (ref 0–0.1)
BASOPHILS NFR BLD: 0.6 % (ref 0.1–1.2)
BILIRUB SERPL-MCNC: 0.3 MG/DL (ref 0.2–0.7)
BNP BLD-MCNC: 2367 PG/ML
BUN SERPL-MCNC: 14 MG/DL (ref 8–23)
CALCIUM SERPL-MCNC: 10.2 MG/DL (ref 8.5–9.9)
CHLORIDE SERPL-SCNC: 102 MEQ/L (ref 95–107)
CO2 SERPL-SCNC: 24 MEQ/L (ref 20–31)
CREAT SERPL-MCNC: 0.73 MG/DL (ref 0.5–0.9)
D DIMER PPP FEU-MCNC: 2.48 MG/L FEU (ref 0–0.5)
EKG ATRIAL RATE: 79 BPM
EKG ATRIAL RATE: 86 BPM
EKG P AXIS: 34 DEGREES
EKG P AXIS: 39 DEGREES
EKG P-R INTERVAL: 170 MS
EKG P-R INTERVAL: 198 MS
EKG Q-T INTERVAL: 394 MS
EKG Q-T INTERVAL: 410 MS
EKG QRS DURATION: 86 MS
EKG QRS DURATION: 86 MS
EKG QTC CALCULATION (BAZETT): 470 MS
EKG QTC CALCULATION (BAZETT): 471 MS
EKG R AXIS: 29 DEGREES
EKG R AXIS: 36 DEGREES
EKG T AXIS: 109 DEGREES
EKG T AXIS: 115 DEGREES
EKG VENTRICULAR RATE: 79 BPM
EKG VENTRICULAR RATE: 86 BPM
EOSINOPHIL # BLD: 0.1 K/UL (ref 0–0.4)
EOSINOPHIL NFR BLD: 2.3 % (ref 0.7–5.8)
ERYTHROCYTE [DISTWIDTH] IN BLOOD BY AUTOMATED COUNT: 12.8 % (ref 11.7–14.4)
GLOBULIN SER CALC-MCNC: 2.7 G/DL (ref 2.3–3.5)
GLUCOSE SERPL-MCNC: 162 MG/DL (ref 70–99)
HCT VFR BLD AUTO: 33.1 % (ref 37–47)
HGB BLD-MCNC: 10.8 G/DL (ref 11.2–15.7)
IMM GRANULOCYTES # BLD: 0 K/UL
IMM GRANULOCYTES NFR BLD: 0.2 %
INFLUENZA A BY PCR: NEGATIVE
INFLUENZA B BY PCR: NEGATIVE
LYMPHOCYTES # BLD: 1.6 K/UL (ref 1.2–3.7)
LYMPHOCYTES NFR BLD: 33.8 %
MAGNESIUM SERPL-MCNC: 2 MG/DL (ref 1.7–2.4)
MCH RBC QN AUTO: 30.4 PG (ref 25.6–32.2)
MCHC RBC AUTO-ENTMCNC: 32.6 % (ref 32.2–35.5)
MCV RBC AUTO: 93.2 FL (ref 79.4–94.8)
MONOCYTES # BLD: 0.5 K/UL (ref 0.2–0.9)
MONOCYTES NFR BLD: 11.3 % (ref 4.7–12.5)
NEUTROPHILS # BLD: 2.5 K/UL (ref 1.6–6.1)
NEUTS SEG NFR BLD: 51.8 % (ref 34–71.1)
PLATELET # BLD AUTO: 336 K/UL (ref 182–369)
POTASSIUM SERPL-SCNC: 4.1 MEQ/L (ref 3.4–4.9)
PROCALCITONIN SERPL IA-MCNC: 0.07 NG/ML (ref 0–0.15)
PROT SERPL-MCNC: 6.5 G/DL (ref 6.3–8)
RBC # BLD AUTO: 3.55 M/UL (ref 3.93–5.22)
SARS-COV-2 RDRP RESP QL NAA+PROBE: NOT DETECTED
SODIUM SERPL-SCNC: 140 MEQ/L (ref 135–144)
TROPONIN, HIGH SENSITIVITY: 32 NG/L (ref 0–19)
TROPONIN, HIGH SENSITIVITY: 35 NG/L (ref 0–19)
WBC # BLD AUTO: 4.8 K/UL (ref 4–10)

## 2025-01-28 PROCEDURE — 6360000004 HC RX CONTRAST MEDICATION: Performed by: EMERGENCY MEDICINE

## 2025-01-28 PROCEDURE — 80053 COMPREHEN METABOLIC PANEL: CPT

## 2025-01-28 PROCEDURE — 99285 EMERGENCY DEPT VISIT HI MDM: CPT

## 2025-01-28 PROCEDURE — 6360000002 HC RX W HCPCS: Performed by: INTERNAL MEDICINE

## 2025-01-28 PROCEDURE — 6370000000 HC RX 637 (ALT 250 FOR IP): Performed by: INTERNAL MEDICINE

## 2025-01-28 PROCEDURE — 93005 ELECTROCARDIOGRAM TRACING: CPT

## 2025-01-28 PROCEDURE — 85379 FIBRIN DEGRADATION QUANT: CPT

## 2025-01-28 PROCEDURE — 87502 INFLUENZA DNA AMP PROBE: CPT

## 2025-01-28 PROCEDURE — 96375 TX/PRO/DX INJ NEW DRUG ADDON: CPT

## 2025-01-28 PROCEDURE — 84484 ASSAY OF TROPONIN QUANT: CPT

## 2025-01-28 PROCEDURE — 87635 SARS-COV-2 COVID-19 AMP PRB: CPT

## 2025-01-28 PROCEDURE — 85025 COMPLETE CBC W/AUTO DIFF WBC: CPT

## 2025-01-28 PROCEDURE — 83880 ASSAY OF NATRIURETIC PEPTIDE: CPT

## 2025-01-28 PROCEDURE — 83735 ASSAY OF MAGNESIUM: CPT

## 2025-01-28 PROCEDURE — 96361 HYDRATE IV INFUSION ADD-ON: CPT

## 2025-01-28 PROCEDURE — 71275 CT ANGIOGRAPHY CHEST: CPT

## 2025-01-28 PROCEDURE — 6360000002 HC RX W HCPCS: Performed by: EMERGENCY MEDICINE

## 2025-01-28 PROCEDURE — 84145 PROCALCITONIN (PCT): CPT

## 2025-01-28 PROCEDURE — 36415 COLL VENOUS BLD VENIPUNCTURE: CPT

## 2025-01-28 PROCEDURE — 1210000000 HC MED SURG R&B

## 2025-01-28 PROCEDURE — 2580000003 HC RX 258: Performed by: EMERGENCY MEDICINE

## 2025-01-28 PROCEDURE — 96365 THER/PROPH/DIAG IV INF INIT: CPT

## 2025-01-28 RX ORDER — LOSARTAN POTASSIUM 50 MG/1
50 TABLET ORAL DAILY
Status: DISCONTINUED | OUTPATIENT
Start: 2025-01-28 | End: 2025-01-31 | Stop reason: HOSPADM

## 2025-01-28 RX ORDER — 0.9 % SODIUM CHLORIDE 0.9 %
1000 INTRAVENOUS SOLUTION INTRAVENOUS ONCE
Status: COMPLETED | OUTPATIENT
Start: 2025-01-28 | End: 2025-01-28

## 2025-01-28 RX ORDER — GUAIFENESIN 600 MG/1
600 TABLET, EXTENDED RELEASE ORAL 2 TIMES DAILY
Status: DISCONTINUED | OUTPATIENT
Start: 2025-01-28 | End: 2025-01-31 | Stop reason: HOSPADM

## 2025-01-28 RX ORDER — METOPROLOL SUCCINATE 50 MG/1
50 TABLET, EXTENDED RELEASE ORAL DAILY
Status: DISCONTINUED | OUTPATIENT
Start: 2025-01-28 | End: 2025-01-31 | Stop reason: HOSPADM

## 2025-01-28 RX ORDER — FAMOTIDINE 20 MG/1
20 TABLET, FILM COATED ORAL 2 TIMES DAILY
Status: DISCONTINUED | OUTPATIENT
Start: 2025-01-28 | End: 2025-01-31 | Stop reason: HOSPADM

## 2025-01-28 RX ORDER — FUROSEMIDE 10 MG/ML
40 INJECTION INTRAMUSCULAR; INTRAVENOUS ONCE
Status: COMPLETED | OUTPATIENT
Start: 2025-01-28 | End: 2025-01-28

## 2025-01-28 RX ORDER — OXCARBAZEPINE 150 MG/1
300 TABLET, FILM COATED ORAL 2 TIMES DAILY
Status: DISCONTINUED | OUTPATIENT
Start: 2025-01-28 | End: 2025-01-31 | Stop reason: HOSPADM

## 2025-01-28 RX ORDER — IOPAMIDOL 755 MG/ML
75 INJECTION, SOLUTION INTRAVASCULAR
Status: COMPLETED | OUTPATIENT
Start: 2025-01-28 | End: 2025-01-28

## 2025-01-28 RX ORDER — ACETAMINOPHEN 325 MG/1
650 TABLET ORAL EVERY 6 HOURS PRN
Status: DISCONTINUED | OUTPATIENT
Start: 2025-01-28 | End: 2025-01-31 | Stop reason: HOSPADM

## 2025-01-28 RX ORDER — PANTOPRAZOLE SODIUM 40 MG/1
40 TABLET, DELAYED RELEASE ORAL
Status: DISCONTINUED | OUTPATIENT
Start: 2025-01-29 | End: 2025-01-31 | Stop reason: HOSPADM

## 2025-01-28 RX ORDER — ASPIRIN 81 MG/1
81 TABLET ORAL DAILY
Status: DISCONTINUED | OUTPATIENT
Start: 2025-01-28 | End: 2025-01-31 | Stop reason: HOSPADM

## 2025-01-28 RX ORDER — FUROSEMIDE 10 MG/ML
20 INJECTION INTRAMUSCULAR; INTRAVENOUS 2 TIMES DAILY
Status: DISCONTINUED | OUTPATIENT
Start: 2025-01-28 | End: 2025-01-30

## 2025-01-28 RX ORDER — ATORVASTATIN CALCIUM 40 MG/1
40 TABLET, FILM COATED ORAL NIGHTLY
Status: DISCONTINUED | OUTPATIENT
Start: 2025-01-28 | End: 2025-01-31 | Stop reason: HOSPADM

## 2025-01-28 RX ORDER — ONDANSETRON 4 MG/1
4 TABLET, ORALLY DISINTEGRATING ORAL EVERY 8 HOURS PRN
Status: DISCONTINUED | OUTPATIENT
Start: 2025-01-28 | End: 2025-01-31 | Stop reason: HOSPADM

## 2025-01-28 RX ADMIN — GUAIFENESIN 600 MG: 600 TABLET, EXTENDED RELEASE ORAL at 09:43

## 2025-01-28 RX ADMIN — SODIUM CHLORIDE 1000 ML: 9 INJECTION, SOLUTION INTRAVENOUS at 02:32

## 2025-01-28 RX ADMIN — TICAGRELOR 90 MG: 90 TABLET ORAL at 21:01

## 2025-01-28 RX ADMIN — GUAIFENESIN 600 MG: 600 TABLET, EXTENDED RELEASE ORAL at 21:01

## 2025-01-28 RX ADMIN — FUROSEMIDE 40 MG: 10 INJECTION, SOLUTION INTRAMUSCULAR; INTRAVENOUS at 06:15

## 2025-01-28 RX ADMIN — FAMOTIDINE 20 MG: 20 TABLET, FILM COATED ORAL at 09:43

## 2025-01-28 RX ADMIN — ATORVASTATIN CALCIUM 40 MG: 40 TABLET, FILM COATED ORAL at 21:01

## 2025-01-28 RX ADMIN — IOPAMIDOL 75 ML: 755 INJECTION, SOLUTION INTRAVENOUS at 03:22

## 2025-01-28 RX ADMIN — OXCARBAZEPINE 300 MG: 150 TABLET, FILM COATED ORAL at 09:43

## 2025-01-28 RX ADMIN — FAMOTIDINE 20 MG: 20 TABLET, FILM COATED ORAL at 21:01

## 2025-01-28 RX ADMIN — OXCARBAZEPINE 300 MG: 150 TABLET, FILM COATED ORAL at 21:01

## 2025-01-28 RX ADMIN — ACETAMINOPHEN 650 MG: 325 TABLET ORAL at 22:15

## 2025-01-28 RX ADMIN — TICAGRELOR 90 MG: 90 TABLET ORAL at 09:43

## 2025-01-28 RX ADMIN — LOSARTAN POTASSIUM 50 MG: 50 TABLET, FILM COATED ORAL at 09:43

## 2025-01-28 RX ADMIN — ONDANSETRON 4 MG: 4 TABLET, ORALLY DISINTEGRATING ORAL at 22:15

## 2025-01-28 RX ADMIN — FUROSEMIDE 20 MG: 10 INJECTION, SOLUTION INTRAMUSCULAR; INTRAVENOUS at 17:41

## 2025-01-28 RX ADMIN — ASPIRIN 81 MG: 81 TABLET, COATED ORAL at 09:43

## 2025-01-28 RX ADMIN — CEFTRIAXONE 1000 MG: 1 INJECTION, POWDER, FOR SOLUTION INTRAMUSCULAR; INTRAVENOUS at 05:45

## 2025-01-28 RX ADMIN — METOPROLOL SUCCINATE 50 MG: 50 TABLET, EXTENDED RELEASE ORAL at 09:43

## 2025-01-28 ASSESSMENT — ENCOUNTER SYMPTOMS
APNEA: 0
SORE THROAT: 0
RHINORRHEA: 0
EYE PAIN: 0
CONSTIPATION: 0
SINUS PRESSURE: 0
DIARRHEA: 0
BACK PAIN: 0
COUGH: 0
WHEEZING: 0
ABDOMINAL PAIN: 0
COLOR CHANGE: 0
VOMITING: 0
ABDOMINAL DISTENTION: 0
PHOTOPHOBIA: 0
NAUSEA: 0
SHORTNESS OF BREATH: 1

## 2025-01-28 ASSESSMENT — LIFESTYLE VARIABLES
HOW OFTEN DO YOU HAVE A DRINK CONTAINING ALCOHOL: NEVER
HOW MANY STANDARD DRINKS CONTAINING ALCOHOL DO YOU HAVE ON A TYPICAL DAY: PATIENT DOES NOT DRINK
HOW OFTEN DO YOU HAVE A DRINK CONTAINING ALCOHOL: NEVER
HOW MANY STANDARD DRINKS CONTAINING ALCOHOL DO YOU HAVE ON A TYPICAL DAY: PATIENT DOES NOT DRINK

## 2025-01-28 ASSESSMENT — PAIN SCALES - GENERAL: PAINLEVEL_OUTOF10: 5

## 2025-01-28 ASSESSMENT — PAIN - FUNCTIONAL ASSESSMENT: PAIN_FUNCTIONAL_ASSESSMENT: NONE - DENIES PAIN

## 2025-01-28 NOTE — ED PROVIDER NOTES
[facility] EMERGENCY DEPARTMENT  eMERGENCY dEPARTMENT eNCOUnter      Pt Name: Rekha Russ  MRN: [id number]  Birthdate 1955  Date of evaluation: 1/28/2025  Provider: Giovanni Denise MD    CHIEF COMPLAINT       Chief Complaint   Patient presents with   • Shortness of Breath     Woke up feeling SOB         HISTORY OF PRESENT ILLNESS   (Location/Symptom, Timing/Onset,Context/Setting, Quality, Duration, Modifying Factors, Severity)  Note limiting factors.   Rekha Russ is a 69 y.o. female who presents to the emergency department with complaint of shortness of breath upon waking up this morning.  Recent NSTEMI with cardiac arrest.  Recent stents.  She denies chest pain, palpitations, orthopnea or PND.  Denies nausea or vomiting.  Denies diarrhea or constipation.  Presenting pulse ox was 93% on room air with respiratory rate of 16.      HPI    Nursing Notes were reviewed.    REVIEW OF SYSTEMS    (2-9 systems for level 4, 10 or more for level 5)     Review of Systems   Constitutional: Negative.  Negative for activity change, appetite change, chills, fatigue and fever.   HENT:  Negative for congestion, ear discharge, ear pain, hearing loss, rhinorrhea, sinus pressure and sore throat.    Eyes:  Negative for photophobia, pain and visual disturbance.   Respiratory:  Positive for shortness of breath. Negative for apnea, cough and wheezing.    Cardiovascular:  Negative for chest pain, palpitations and leg swelling.   Gastrointestinal:  Negative for abdominal distention, abdominal pain, constipation, diarrhea, nausea and vomiting.   Endocrine: Negative for cold intolerance, heat intolerance and polyuria.   Genitourinary:  Negative for dysuria, flank pain, frequency and urgency.   Musculoskeletal:  Negative for arthralgias, back pain, gait problem, myalgias and neck stiffness.   Skin:  Negative for color change, pallor and rash.   Allergic/Immunologic: Negative for food allergies and immunocompromised    Deformity of the anterior sternal charles suggestive of fracture of  indeterminate acuity. Correlation with point tenderness suggested.    No acute pulmonary embolism identified.     Moderate left effusion with atelectasis/consolidation.     Deformity of the anterior sternal charles suggestive of fracture of  indeterminate acuity. Correlation with point tenderness suggested.    Given elevated BNP of 2367, left pleural effusion/atelectasis and consolidation patient was medicated with Rocephin and 40 mg of Lasix IV.    Patient's care was discussed with hospitalist Dr. Salazar.     Plan for hospitalization was discussed with patient and she was agreeable.    She remained hemodynamically and clinically stable through ED course.     Vitals:    Vitals:    01/29/25 1843 01/30/25 0709 01/30/25 0757 01/31/25 0535   BP:  (!) 134/108 (!) 187/67 135/76   Pulse: 80 84  (!) 38   Resp: 20 18  18   Temp:  98.6 °F (37 °C)  98.2 °F (36.8 °C)   TempSrc:  Oral  Oral   SpO2: 96% 94%  (!) 76%   Weight:       Height:               MDM     Amount and/or Complexity of Data Reviewed  Clinical lab tests: reviewed and ordered  Tests in the radiology section of CPT®: reviewed and ordered (Chest x-ray shows no acute cardiopulmonary process.)  Tests in the medicine section of CPT®: ordered and reviewed (Twelve-lead EKG shows normal sinus rhythm, rate 86 bpm, normal intervals, normal electrical axis, old septal infarct, lateral ST-T wave changes.)  Decide to obtain previous medical records or to obtain history from someone other than the patient: yes  Review and summarize past medical records: yes    Risk of Complications, Morbidity, and/or Mortality  Presenting problems: moderate  Diagnostic procedures: moderate  Management options: moderate    Patient Progress  Patient progress: improved        CRITICAL CARE TIME   Total Critical Care time was  minutes, excluding separately reportable procedures.  There was a high probability of clinically

## 2025-01-28 NOTE — PROGRESS NOTES
The patient in alert and oriented and pleasant. She has some chest tenderness from CPR. She lives at home with her spouse and states she was not feeling well and came to this hospital. She is aware of her diagnosis. Pt denies pain, encouraged to cough and deep breathe. Call light in reach.

## 2025-01-28 NOTE — H&P
Hospital Medicine History & Physical      PCP: Glen Cat MD    Date of Admission: 1/28/2025    Date of Service: 1/28/25      Chief Complaint:  dyspnea/SOB      History Of Present Illness:  69 y.o. female who presented to [facility] with above complains. Patient had NSTEMI and cardiac arrest back in December, had cardiac cath x2 with 3 drug eluting stents placed per cardiology was doing well. She had some dyspnea/SOB for the past week with cough/sputum production. Had chest heaviness/tightness. She woke up today over night with respiratory distress/dyspnea and came to ER. After initial stabilization was admitted for further management   Denied fever, dizziness, compliant with med treatment at home     Past Medical History:          Diagnosis Date    Hypertension     Trigeminal neuralgia pain        Past Surgical History:          Procedure Laterality Date    CARDIAC PROCEDURE N/A 12/26/2024    Left heart cath / coronary angiography / possible percutaneous coronary intervention. performed by Bobby Franco DO at [facility]    CARDIAC PROCEDURE N/A 12/26/2024    Percutaneous coronary intervention performed by Bobby Franco DO at [facility]    CARDIAC PROCEDURE N/A 12/29/2024    Left heart cath / coronary angiography performed by Bobby Franco DO at [facility]    CARDIAC PROCEDURE N/A 12/29/2024    Percutaneous coronary intervention performed by Bobby Franco DO at [facility]    HYSTERECTOMY (CERVIX STATUS UNKNOWN)      TONSILLECTOMY         Medications Prior to Admission:      Prior to Admission medications    Medication Sig Start Date End Date Taking? Authorizing Provider   ticagrelor (BRILINTA) 90 MG TABS tablet Take 1 tablet by mouth 2 times daily 1/9/25  Yes Elba Li APRN - CNP   aspirin 81 MG EC tablet Take 1 tablet by mouth daily 1/9/25  Yes Elba Li APRN - CNP   losartan (COZAAR) 50 MG tablet Take 1 tablet by mouth daily 1/9/25  Yes

## 2025-01-29 LAB
ANION GAP SERPL CALCULATED.3IONS-SCNC: 13 MEQ/L (ref 9–15)
BASOPHILS # BLD: 0 K/UL (ref 0–0.1)
BASOPHILS NFR BLD: 0.6 % (ref 0.1–1.2)
BNP BLD-MCNC: 3383 PG/ML
BUN SERPL-MCNC: 12 MG/DL (ref 8–23)
CALCIUM SERPL-MCNC: 10.2 MG/DL (ref 8.5–9.9)
CHLORIDE SERPL-SCNC: 101 MEQ/L (ref 95–107)
CO2 SERPL-SCNC: 25 MEQ/L (ref 20–31)
CREAT SERPL-MCNC: 0.77 MG/DL (ref 0.5–0.9)
EOSINOPHIL # BLD: 0.1 K/UL (ref 0–0.4)
EOSINOPHIL NFR BLD: 2 % (ref 0.7–5.8)
ERYTHROCYTE [DISTWIDTH] IN BLOOD BY AUTOMATED COUNT: 12.8 % (ref 11.7–14.4)
GLUCOSE SERPL-MCNC: 149 MG/DL (ref 70–99)
HCT VFR BLD AUTO: 34.8 % (ref 37–47)
HGB BLD-MCNC: 11.3 G/DL (ref 11.2–15.7)
IMM GRANULOCYTES # BLD: 0 K/UL
IMM GRANULOCYTES NFR BLD: 0.4 %
LYMPHOCYTES # BLD: 1.4 K/UL (ref 1.2–3.7)
LYMPHOCYTES NFR BLD: 26.5 %
MCH RBC QN AUTO: 30.3 PG (ref 25.6–32.2)
MCHC RBC AUTO-ENTMCNC: 32.5 % (ref 32.2–35.5)
MCV RBC AUTO: 93.3 FL (ref 79.4–94.8)
MONOCYTES # BLD: 0.7 K/UL (ref 0.2–0.9)
MONOCYTES NFR BLD: 12.3 % (ref 4.7–12.5)
NEUTROPHILS # BLD: 3.2 K/UL (ref 1.6–6.1)
NEUTS SEG NFR BLD: 58.2 % (ref 34–71.1)
PLATELET # BLD AUTO: 306 K/UL (ref 182–369)
POTASSIUM SERPL-SCNC: 3.6 MEQ/L (ref 3.4–4.9)
RBC # BLD AUTO: 3.73 M/UL (ref 3.93–5.22)
SODIUM SERPL-SCNC: 139 MEQ/L (ref 135–144)
WBC # BLD AUTO: 5.4 K/UL (ref 4–10)

## 2025-01-29 PROCEDURE — 6370000000 HC RX 637 (ALT 250 FOR IP): Performed by: INTERNAL MEDICINE

## 2025-01-29 PROCEDURE — 85025 COMPLETE CBC W/AUTO DIFF WBC: CPT

## 2025-01-29 PROCEDURE — 6360000002 HC RX W HCPCS: Performed by: INTERNAL MEDICINE

## 2025-01-29 PROCEDURE — 80048 BASIC METABOLIC PNL TOTAL CA: CPT

## 2025-01-29 PROCEDURE — 2580000003 HC RX 258: Performed by: INTERNAL MEDICINE

## 2025-01-29 PROCEDURE — 83880 ASSAY OF NATRIURETIC PEPTIDE: CPT

## 2025-01-29 PROCEDURE — 1210000000 HC MED SURG R&B

## 2025-01-29 PROCEDURE — 36415 COLL VENOUS BLD VENIPUNCTURE: CPT

## 2025-01-29 RX ORDER — ENOXAPARIN SODIUM 100 MG/ML
40 INJECTION SUBCUTANEOUS DAILY
Status: DISCONTINUED | OUTPATIENT
Start: 2025-01-29 | End: 2025-01-31 | Stop reason: HOSPADM

## 2025-01-29 RX ORDER — POTASSIUM CHLORIDE 1500 MG/1
40 TABLET, EXTENDED RELEASE ORAL ONCE
Status: COMPLETED | OUTPATIENT
Start: 2025-01-29 | End: 2025-01-29

## 2025-01-29 RX ORDER — ALPRAZOLAM 0.25 MG/1
0.25 TABLET ORAL 4 TIMES DAILY PRN
Status: DISCONTINUED | OUTPATIENT
Start: 2025-01-29 | End: 2025-01-31 | Stop reason: HOSPADM

## 2025-01-29 RX ADMIN — FAMOTIDINE 20 MG: 20 TABLET, FILM COATED ORAL at 08:05

## 2025-01-29 RX ADMIN — ASPIRIN 81 MG: 81 TABLET, COATED ORAL at 08:04

## 2025-01-29 RX ADMIN — ACETAMINOPHEN 650 MG: 325 TABLET ORAL at 20:54

## 2025-01-29 RX ADMIN — OXCARBAZEPINE 300 MG: 150 TABLET, FILM COATED ORAL at 08:04

## 2025-01-29 RX ADMIN — TICAGRELOR 90 MG: 90 TABLET ORAL at 08:05

## 2025-01-29 RX ADMIN — POTASSIUM CHLORIDE 40 MEQ: 1500 TABLET, EXTENDED RELEASE ORAL at 10:06

## 2025-01-29 RX ADMIN — ALPRAZOLAM 0.25 MG: 0.25 TABLET ORAL at 10:59

## 2025-01-29 RX ADMIN — PANTOPRAZOLE SODIUM 40 MG: 40 TABLET, DELAYED RELEASE ORAL at 06:22

## 2025-01-29 RX ADMIN — FUROSEMIDE 20 MG: 10 INJECTION, SOLUTION INTRAMUSCULAR; INTRAVENOUS at 08:05

## 2025-01-29 RX ADMIN — GUAIFENESIN 600 MG: 600 TABLET, EXTENDED RELEASE ORAL at 20:54

## 2025-01-29 RX ADMIN — TICAGRELOR 90 MG: 90 TABLET ORAL at 20:55

## 2025-01-29 RX ADMIN — FAMOTIDINE 20 MG: 20 TABLET, FILM COATED ORAL at 20:53

## 2025-01-29 RX ADMIN — LOSARTAN POTASSIUM 50 MG: 50 TABLET, FILM COATED ORAL at 08:05

## 2025-01-29 RX ADMIN — ATORVASTATIN CALCIUM 40 MG: 40 TABLET, FILM COATED ORAL at 20:54

## 2025-01-29 RX ADMIN — GUAIFENESIN 600 MG: 600 TABLET, EXTENDED RELEASE ORAL at 08:05

## 2025-01-29 RX ADMIN — METOPROLOL SUCCINATE 50 MG: 50 TABLET, EXTENDED RELEASE ORAL at 08:04

## 2025-01-29 RX ADMIN — ENOXAPARIN SODIUM 40 MG: 100 INJECTION SUBCUTANEOUS at 12:20

## 2025-01-29 RX ADMIN — CEFTRIAXONE 1000 MG: 1 INJECTION, POWDER, FOR SOLUTION INTRAMUSCULAR; INTRAVENOUS at 06:29

## 2025-01-29 RX ADMIN — FUROSEMIDE 20 MG: 10 INJECTION, SOLUTION INTRAMUSCULAR; INTRAVENOUS at 17:19

## 2025-01-29 RX ADMIN — OXCARBAZEPINE 300 MG: 150 TABLET, FILM COATED ORAL at 20:53

## 2025-01-29 ASSESSMENT — PAIN DESCRIPTION - LOCATION: LOCATION: SHOULDER

## 2025-01-29 ASSESSMENT — PAIN SCALES - GENERAL: PAINLEVEL_OUTOF10: 2

## 2025-01-29 NOTE — PROGRESS NOTES
Daughter Artem and pt spoke with this nurse and Dr. Gallagher.  All questions answered several times.  Updates given.  Pt very anxious and requesting something to calm her nerves.  Xanax ordered and given.

## 2025-01-29 NOTE — CARE COORDINATION
Merlyn Chavis Initial Discharge Assessment    Met with Patient to discuss discharge plan.        PCP: Glen Cat MD                                  Date of Last Visit: 07/2024    If no PCP, list provided? N/A    Discharge Planning    Living Arrangements: independently at home    Who do you live with? , Dario    Who helps you with your care:  self    If lives at home:     Do you have any barriers navigating in your home? no    Patient can perform ADL?  Yes    Current Services (outpatient and in home) :  [facility] ([facility])    Dialysis: No    Is transportation available to get to your appointments? Yes    DME Equipment:  no    Respiratory equipment: None    Respiratory provider:  no     Pharmacy:  yes - [facility], [place]    Able to afford cost of medication: Yes    Does patient feel safe at home? Yes    Does patient identify any home going needs? No    Patient agreeable to HHC?      Yes, [facility]    Patient agreeable to SNF/Rehab?     No    Other discharge needs identified?      N/A    Was Freedom of Choice Provided?      Yes    Initial Discharge Plan? (Note: please see concurrent daily documentation for any updates after initial note).    Return home with supportive  Dario with [facility].      Electronically signed by DUNCAN Wood on 1/29/2025 at 2:05 PM

## 2025-01-29 NOTE — PROGRESS NOTES
Hospitalist Progress Note      PCP: Glen Cat MD    Date of Admission: 1/28/2025    Chief Complaint: dyspnea/edema    Subjective: patient awake/alert     Medications:  Reviewed    Infusion Medications   Scheduled Medications    potassium chloride  40 mEq Oral Once    aspirin  81 mg Oral Daily    atorvastatin  40 mg Oral Nightly    famotidine  20 mg Oral BID    losartan  50 mg Oral Daily    metoprolol succinate  50 mg Oral Daily    OXcarbazepine  300 mg Oral BID    pantoprazole  40 mg Oral QAM AC    ticagrelor  90 mg Oral BID    furosemide  20 mg IntraVENous BID    guaiFENesin  600 mg Oral BID     PRN Meds: ondansetron, acetaminophen    No intake or output data in the 24 hours ending 01/29/25 0922    Exam:    /69   Pulse 74   Temp 98.4 °F (36.9 °C) (Oral)   Resp 18   Ht 1.651 m (5' 5\")   Wt 82 kg (180 lb 12.8 oz)   LMP  (LMP Unknown)   SpO2 94%   BMI 30.09 kg/m²     General appearance: No apparent distress, appears stated age and cooperative.  HEENT: Pupils equal, round, and reactive to light. Conjunctivae/corneas clear.  Neck: Supple, with full range of motion. No jugular venous distention. Trachea midline.  Respiratory:  Normal respiratory effort. Clear to auscultation, bilaterally without Rales/Wheezes/Rhonchi.  Cardiovascular: Regular rate and rhythm with normal S1/S2 without murmurs, rubs or gallops.  Abdomen: Soft, non-tender, non-distended with normal bowel sounds.  Musculoskeletal:  edema bilaterally.  Full range of motion without deformity.  Skin: Skin color, texture, turgor normal.  No rashes or lesions.  Neurologic:  Neurovascularly intact without any focal sensory/motor deficits. Cranial nerves: II-XII intact, grossly non-focal.  Psychiatric: Alert and oriented, thought content appropriate, normal insight  Capillary Refill: Brisk,< 3 seconds   Peripheral Pulses: +2 palpable, equal bilaterally       Labs:   Recent Labs     01/28/25  0231 01/29/25  0520   WBC 4.8 5.4   HGB 10.8* 11.3  patient, in addition to talking to RN team, mid levels, consulting with other physicians and following up on labs and imaging.    Electronically signed by Talat Gallagher MD on 1/29/2025 at 9:22 AM

## 2025-01-29 NOTE — PROGRESS NOTES
Facility/Department: [facility] MED SURG UNIT  Occupational Therapy Initial Assessment    Name: Rekha Russ  : 1955  MRN: [id number]  Date of Service: 2025    Per physical therapist, pt not requiring OT at this time and is deferring evaluation d/t independent level of function.    Therapy Time   Individual Concurrent Group Co-treatment   Time In  2:45pm         Time Out  2:50pm         Minutes  5                 Rosalba Lazaro, [id number]

## 2025-01-29 NOTE — PROGRESS NOTES
Physical Therapy    PT spoke with pt and pt was doing very well and did not feel that she needed PT or OT at this time. Therefore no PT evaluation was performed. PT updated OT and PT .     Daksha Ayala, PT     2:30pm-2:35pm= 5 min

## 2025-01-30 LAB
ANION GAP SERPL CALCULATED.3IONS-SCNC: 13 MEQ/L (ref 9–15)
BNP BLD-MCNC: 2380 PG/ML
BUN SERPL-MCNC: 16 MG/DL (ref 8–23)
CALCIUM SERPL-MCNC: 10.4 MG/DL (ref 8.5–9.9)
CHLORIDE SERPL-SCNC: 99 MEQ/L (ref 95–107)
CO2 SERPL-SCNC: 26 MEQ/L (ref 20–31)
CREAT SERPL-MCNC: 0.86 MG/DL (ref 0.5–0.9)
GLUCOSE SERPL-MCNC: 147 MG/DL (ref 70–99)
POTASSIUM SERPL-SCNC: 3.8 MEQ/L (ref 3.4–4.9)
SODIUM SERPL-SCNC: 138 MEQ/L (ref 135–144)
TSH SERPL-MCNC: 1.29 UIU/ML (ref 0.44–3.86)

## 2025-01-30 PROCEDURE — 1210000000 HC MED SURG R&B

## 2025-01-30 PROCEDURE — 36415 COLL VENOUS BLD VENIPUNCTURE: CPT

## 2025-01-30 PROCEDURE — 6370000000 HC RX 637 (ALT 250 FOR IP): Performed by: INTERNAL MEDICINE

## 2025-01-30 PROCEDURE — 6360000002 HC RX W HCPCS: Performed by: INTERNAL MEDICINE

## 2025-01-30 PROCEDURE — 83880 ASSAY OF NATRIURETIC PEPTIDE: CPT

## 2025-01-30 PROCEDURE — 80048 BASIC METABOLIC PNL TOTAL CA: CPT

## 2025-01-30 PROCEDURE — 84443 ASSAY THYROID STIM HORMONE: CPT

## 2025-01-30 RX ORDER — FUROSEMIDE 10 MG/ML
40 INJECTION INTRAMUSCULAR; INTRAVENOUS 2 TIMES DAILY
Status: DISCONTINUED | OUTPATIENT
Start: 2025-01-30 | End: 2025-01-31 | Stop reason: HOSPADM

## 2025-01-30 RX ADMIN — GUAIFENESIN 600 MG: 600 TABLET, EXTENDED RELEASE ORAL at 07:57

## 2025-01-30 RX ADMIN — FUROSEMIDE 40 MG: 10 INJECTION, SOLUTION INTRAMUSCULAR; INTRAVENOUS at 17:07

## 2025-01-30 RX ADMIN — ASPIRIN 81 MG: 81 TABLET, COATED ORAL at 07:57

## 2025-01-30 RX ADMIN — ALPRAZOLAM 0.25 MG: 0.25 TABLET ORAL at 21:30

## 2025-01-30 RX ADMIN — FAMOTIDINE 20 MG: 20 TABLET, FILM COATED ORAL at 21:30

## 2025-01-30 RX ADMIN — ATORVASTATIN CALCIUM 40 MG: 40 TABLET, FILM COATED ORAL at 21:30

## 2025-01-30 RX ADMIN — FUROSEMIDE 40 MG: 10 INJECTION, SOLUTION INTRAMUSCULAR; INTRAVENOUS at 07:58

## 2025-01-30 RX ADMIN — METOPROLOL SUCCINATE 50 MG: 50 TABLET, EXTENDED RELEASE ORAL at 07:58

## 2025-01-30 RX ADMIN — LOSARTAN POTASSIUM 50 MG: 50 TABLET, FILM COATED ORAL at 07:57

## 2025-01-30 RX ADMIN — FAMOTIDINE 20 MG: 20 TABLET, FILM COATED ORAL at 07:57

## 2025-01-30 RX ADMIN — PANTOPRAZOLE SODIUM 40 MG: 40 TABLET, DELAYED RELEASE ORAL at 07:03

## 2025-01-30 RX ADMIN — TICAGRELOR 90 MG: 90 TABLET ORAL at 21:32

## 2025-01-30 RX ADMIN — ACETAMINOPHEN 650 MG: 325 TABLET ORAL at 21:30

## 2025-01-30 RX ADMIN — GUAIFENESIN 600 MG: 600 TABLET, EXTENDED RELEASE ORAL at 21:30

## 2025-01-30 RX ADMIN — OXCARBAZEPINE 300 MG: 150 TABLET, FILM COATED ORAL at 07:57

## 2025-01-30 RX ADMIN — ENOXAPARIN SODIUM 40 MG: 100 INJECTION SUBCUTANEOUS at 07:57

## 2025-01-30 RX ADMIN — TICAGRELOR 90 MG: 90 TABLET ORAL at 07:57

## 2025-01-30 RX ADMIN — OXCARBAZEPINE 300 MG: 150 TABLET, FILM COATED ORAL at 21:32

## 2025-01-30 RX ADMIN — ALPRAZOLAM 0.25 MG: 0.25 TABLET ORAL at 07:57

## 2025-01-30 ASSESSMENT — PAIN SCALES - GENERAL: PAINLEVEL_OUTOF10: 5

## 2025-01-30 ASSESSMENT — PAIN DESCRIPTION - LOCATION: LOCATION: SHOULDER

## 2025-01-30 NOTE — PLAN OF CARE
Problem: Discharge Planning  Goal: Discharge to home or other facility with appropriate resources  1/30/2025 0742 by Leidy Fry, RN  Outcome: Progressing  1/30/2025 0742 by Leidy Fry, RN  Outcome: Progressing     Problem: Safety - Adult  Goal: Free from fall injury  1/30/2025 0742 by Leidy Fry, RN  Outcome: Progressing  1/30/2025 0742 by Leidy Fry, RN  Outcome: Progressing      Detail Level: Detailed

## 2025-01-30 NOTE — PLAN OF CARE
Problem: Discharge Planning  Goal: Discharge to home or other facility with appropriate resources  1/30/2025 0743 by Leidy Fry RN  Outcome: Progressing  1/30/2025 0742 by Leidy Fry RN  Outcome: Progressing  1/30/2025 0742 by Leidy Fry RN  Outcome: Progressing     Problem: Safety - Adult  Goal: Free from fall injury  1/30/2025 0743 by Leidy Fry RN  Outcome: Progressing  1/30/2025 0742 by Leidy Fry, RN  Outcome: Progressing  1/30/2025 0742 by Leidy Fry RN  Outcome: Progressing

## 2025-01-30 NOTE — PROGRESS NOTES
Hospitalist Progress Note      PCP: Glen Cat MD    Date of Admission: 1/28/2025    Chief Complaint: dyspnea    Subjective: patient awake/alert     Medications:  Reviewed    Infusion Medications   Scheduled Medications    furosemide  40 mg IntraVENous BID    enoxaparin  40 mg SubCUTAneous Daily    aspirin  81 mg Oral Daily    atorvastatin  40 mg Oral Nightly    famotidine  20 mg Oral BID    losartan  50 mg Oral Daily    metoprolol succinate  50 mg Oral Daily    OXcarbazepine  300 mg Oral BID    pantoprazole  40 mg Oral QAM AC    ticagrelor  90 mg Oral BID    guaiFENesin  600 mg Oral BID     PRN Meds: ALPRAZolam, ondansetron, acetaminophen      Intake/Output Summary (Last 24 hours) at 1/30/2025 0749  Last data filed at 1/30/2025 0703  Gross per 24 hour   Intake 440 ml   Output 450 ml   Net -10 ml       Exam:    BP (!) 134/108   Pulse 84   Temp 98.6 °F (37 °C)   Resp 20   Ht 1.651 m (5' 5\")   Wt 82 kg (180 lb 12.8 oz)   LMP  (LMP Unknown)   SpO2 94%   BMI 30.09 kg/m²     General appearance: No apparent distress, appears stated age and cooperative.  HEENT: Pupils equal, round, and reactive to light. Conjunctivae/corneas clear.  Neck: Supple, with full range of motion. No jugular venous distention. Trachea midline.  Respiratory:  Normal respiratory effort. Clear to auscultation, bilaterally without Rales/Wheezes/Rhonchi.  Cardiovascular: Regular rate and rhythm with normal S1/S2 without murmurs, rubs or gallops.  Abdomen: Soft, non-tender, non-distended with normal bowel sounds.  Musculoskeletal: trace edema bilaterally.  Full range of motion without deformity.  Skin: Skin color, texture, turgor normal.  No rashes or lesions.  Neurologic:  Neurovascularly intact without any focal sensory/motor deficits. Cranial nerves: II-XII intact, grossly non-focal.  Psychiatric: Alert and oriented, thought content appropriate, normal insight  Capillary Refill: Brisk,< 3 seconds   Peripheral Pulses: +2 palpable,

## 2025-01-31 VITALS
BODY MASS INDEX: 30.12 KG/M2 | WEIGHT: 180.8 LBS | RESPIRATION RATE: 18 BRPM | DIASTOLIC BLOOD PRESSURE: 76 MMHG | OXYGEN SATURATION: 76 % | HEIGHT: 65 IN | TEMPERATURE: 98.2 F | HEART RATE: 38 BPM | SYSTOLIC BLOOD PRESSURE: 135 MMHG

## 2025-01-31 LAB
ANION GAP SERPL CALCULATED.3IONS-SCNC: 12 MEQ/L (ref 9–15)
BNP BLD-MCNC: 2189 PG/ML
BUN SERPL-MCNC: 22 MG/DL (ref 8–23)
CALCIUM SERPL-MCNC: 10.5 MG/DL (ref 8.5–9.9)
CHLORIDE SERPL-SCNC: 100 MEQ/L (ref 95–107)
CO2 SERPL-SCNC: 27 MEQ/L (ref 20–31)
CREAT SERPL-MCNC: 0.99 MG/DL (ref 0.5–0.9)
GLUCOSE SERPL-MCNC: 144 MG/DL (ref 70–99)
POTASSIUM SERPL-SCNC: 3.4 MEQ/L (ref 3.4–4.9)
SODIUM SERPL-SCNC: 139 MEQ/L (ref 135–144)

## 2025-01-31 PROCEDURE — 6370000000 HC RX 637 (ALT 250 FOR IP): Performed by: INTERNAL MEDICINE

## 2025-01-31 PROCEDURE — 36415 COLL VENOUS BLD VENIPUNCTURE: CPT

## 2025-01-31 PROCEDURE — 83880 ASSAY OF NATRIURETIC PEPTIDE: CPT

## 2025-01-31 PROCEDURE — 6360000002 HC RX W HCPCS: Performed by: INTERNAL MEDICINE

## 2025-01-31 PROCEDURE — 80048 BASIC METABOLIC PNL TOTAL CA: CPT

## 2025-01-31 RX ORDER — POTASSIUM CHLORIDE 1500 MG/1
40 TABLET, EXTENDED RELEASE ORAL ONCE
Status: COMPLETED | OUTPATIENT
Start: 2025-01-31 | End: 2025-01-31

## 2025-01-31 RX ORDER — ATORVASTATIN CALCIUM 40 MG/1
40 TABLET, FILM COATED ORAL NIGHTLY
Qty: 30 TABLET | Refills: 3 | Status: SHIPPED | OUTPATIENT
Start: 2025-01-31 | End: 2025-04-27

## 2025-01-31 RX ORDER — FUROSEMIDE 20 MG/1
20 TABLET ORAL DAILY
Qty: 60 TABLET | Refills: 3 | Status: SHIPPED | OUTPATIENT
Start: 2025-01-31

## 2025-01-31 RX ADMIN — TICAGRELOR 90 MG: 90 TABLET ORAL at 07:47

## 2025-01-31 RX ADMIN — PANTOPRAZOLE SODIUM 40 MG: 40 TABLET, DELAYED RELEASE ORAL at 06:44

## 2025-01-31 RX ADMIN — FUROSEMIDE 40 MG: 10 INJECTION, SOLUTION INTRAMUSCULAR; INTRAVENOUS at 07:46

## 2025-01-31 RX ADMIN — GUAIFENESIN 600 MG: 600 TABLET, EXTENDED RELEASE ORAL at 07:47

## 2025-01-31 RX ADMIN — POTASSIUM CHLORIDE 40 MEQ: 1500 TABLET, EXTENDED RELEASE ORAL at 07:47

## 2025-01-31 RX ADMIN — ENOXAPARIN SODIUM 40 MG: 100 INJECTION SUBCUTANEOUS at 07:48

## 2025-01-31 RX ADMIN — LOSARTAN POTASSIUM 50 MG: 50 TABLET, FILM COATED ORAL at 07:47

## 2025-01-31 RX ADMIN — ASPIRIN 81 MG: 81 TABLET, COATED ORAL at 07:48

## 2025-01-31 RX ADMIN — FAMOTIDINE 20 MG: 20 TABLET, FILM COATED ORAL at 07:47

## 2025-01-31 RX ADMIN — METOPROLOL SUCCINATE 50 MG: 50 TABLET, EXTENDED RELEASE ORAL at 07:48

## 2025-01-31 RX ADMIN — OXCARBAZEPINE 300 MG: 150 TABLET, FILM COATED ORAL at 07:48

## 2025-01-31 NOTE — PROGRESS NOTES
Patient in bed, medicated as ordered. Pt is preparing for discharge today. She states she feels anxious about her diagnosis of CHF. Pt educated to disease management and encouraged to make healthy choices. Pt's IV dc'ed without issue. Pt states her spouse will come get her around 10am.

## 2025-01-31 NOTE — DISCHARGE SUMMARY
mEq/L    Potassium 4.1 3.4 - 4.9 mEq/L    Chloride 102 95 - 107 mEq/L    CO2 24 20 - 31 mEq/L    Anion Gap 14 9 - 15 mEq/L    Glucose 162 (H) 70 - 99 mg/dL    BUN 14 8 - 23 mg/dL    Creatinine 0.73 0.50 - 0.90 mg/dL    Est, Glom Filt Rate 89.0 >60    Calcium 10.2 (H) 8.5 - 9.9 mg/dL    Total Protein 6.5 6.3 - 8.0 g/dL    Albumin 3.8 3.5 - 4.6 g/dL    Total Bilirubin 0.3 0.2 - 0.7 mg/dL    Alkaline Phosphatase 149 (H) 40 - 130 U/L    ALT 14 0 - 33 U/L    AST 17 0 - 35 U/L    Globulin 2.7 2.3 - 3.5 g/dL   Magnesium   Result Value Ref Range    Magnesium 2.0 1.7 - 2.4 mg/dL   Troponin   Result Value Ref Range    Troponin, High Sensitivity 32 (H) 0 - 19 ng/L   D-Dimer, Quantitative   Result Value Ref Range    D-Dimer, Quant 2.48 (HH) 0.00 - 0.50 mg/L FEU   Brain Natriuretic Peptide   Result Value Ref Range    NT Pro-BNP 2,367 pg/mL   Troponin   Result Value Ref Range    Troponin, High Sensitivity 35 (H) 0 - 19 ng/L   Procalcitonin   Result Value Ref Range    Procalcitonin 0.07 0.00 - 0.15 ng/mL   Brain Natriuretic Peptide   Result Value Ref Range    NT Pro-BNP 3,383 pg/mL   Basic Metabolic Panel   Result Value Ref Range    Sodium 139 135 - 144 mEq/L    Potassium 3.6 3.4 - 4.9 mEq/L    Chloride 101 95 - 107 mEq/L    CO2 25 20 - 31 mEq/L    Anion Gap 13 9 - 15 mEq/L    Glucose 149 (H) 70 - 99 mg/dL    BUN 12 8 - 23 mg/dL    Creatinine 0.77 0.50 - 0.90 mg/dL    Est, Glom Filt Rate 83.5 >60    Calcium 10.2 (H) 8.5 - 9.9 mg/dL   CBC with Auto Differential   Result Value Ref Range    WBC 5.4 4.0 - 10.0 K/uL    RBC 3.73 (L) 3.93 - 5.22 M/uL    Hemoglobin 11.3 11.2 - 15.7 g/dL    Hematocrit 34.8 (L) 37.0 - 47.0 %    MCV 93.3 79.4 - 94.8 fL    MCH 30.3 25.6 - 32.2 pg    MCHC 32.5 32.2 - 35.5 %    RDW 12.8 11.7 - 14.4 %    Platelets 306 182 - 369 K/uL    Neutrophils % 58.2 34.0 - 71.1 %    Immature Granulocytes % 0.4 %    Lymphocytes % 26.5 %    Monocytes % 12.3 4.7 - 12.5 %    Eosinophils % 2.0 0.7 - 5.8 %    Basophils % 0.6 0.1  5.8 %    Basophils % 0.6 0.1 - 1.2 %    Neutrophils Absolute 3.2 1.6 - 6.1 K/uL    Immature Granulocytes # 0.0 K/uL    Lymphocytes Absolute 1.4 1.2 - 3.7 K/uL    Monocytes Absolute 0.7 0.2 - 0.9 K/uL    Eosinophils Absolute 0.1 0.0 - 0.4 K/uL    Basophils Absolute 0.0 0.0 - 0.1 K/uL   Basic Metabolic Panel   Result Value Ref Range    Sodium 138 135 - 144 mEq/L    Potassium 3.8 3.4 - 4.9 mEq/L    Chloride 99 95 - 107 mEq/L    CO2 26 20 - 31 mEq/L    Anion Gap 13 9 - 15 mEq/L    Glucose 147 (H) 70 - 99 mg/dL    BUN 16 8 - 23 mg/dL    Creatinine 0.86 0.50 - 0.90 mg/dL    Est, Glom Filt Rate 73.1 >60    Calcium 10.4 (H) 8.5 - 9.9 mg/dL   Brain Natriuretic Peptide   Result Value Ref Range    NT Pro-BNP 2,380 pg/mL   TSH   Result Value Ref Range    TSH 1.290 0.440 - 3.860 uIU/mL   Basic Metabolic Panel   Result Value Ref Range    Sodium 139 135 - 144 mEq/L    Potassium 3.4 3.4 - 4.9 mEq/L    Chloride 100 95 - 107 mEq/L    CO2 27 20 - 31 mEq/L    Anion Gap 12 9 - 15 mEq/L    Glucose 144 (H) 70 - 99 mg/dL    BUN 22 8 - 23 mg/dL    Creatinine 0.99 (H) 0.50 - 0.90 mg/dL    Est, Glom Filt Rate 61.7 >60    Calcium 10.5 (H) 8.5 - 9.9 mg/dL   Brain Natriuretic Peptide   Result Value Ref Range    NT Pro-BNP 2,189 pg/mL   EKG 12 Lead   Result Value Ref Range    Ventricular Rate 86 BPM    Atrial Rate 86 BPM    P-R Interval 170 ms    QRS Duration 86 ms    Q-T Interval 394 ms    QTc Calculation (Bazett) 471 ms    P Axis 34 degrees    R Axis 36 degrees    T Axis 115 degrees   EKG 12 Lead   Result Value Ref Range    Ventricular Rate 79 BPM    Atrial Rate 79 BPM    P-R Interval 198 ms    QRS Duration 86 ms    Q-T Interval 410 ms    QTc Calculation (Bazett) 470 ms    P Axis 39 degrees    R Axis 29 degrees    T Axis 109 degrees       Diet:  ADULT DIET; Regular    Activity:  Activity as tolerated (Patient may move about with assist as indicated or with supervision.)    Follow-up:  in 1 weeks with Glen Cat MD,       Disposition:

## 2025-01-31 NOTE — DISCHARGE INSTR - COC
Continuity of Care Form    Patient Name: Rekha Butt   :  1955  MRN:  [id number]    Admit date:  2025  Discharge date:  2025    Code Status Order: Prior   Advance Directives:   Advance Care Flowsheet Documentation             Admitting Physician:  Talat Gallagher MD  PCP: Glen Cat MD    Discharging Nurse: Lacey LUNDY  Discharging Hospital Unit/Room#: 0210/0210-01  Discharging Unit Phone Number: [phone]    Emergency Contact:   Extended Emergency Contact Information  Primary Emergency Contact: Artem Butt   [facility]  Home Phone: [phone]  Relation: Child  Secondary Emergency Contact: HERON BUTT  Home Phone: [phone]  Relation: Spouse    Past Surgical History:  Past Surgical History:   Procedure Laterality Date    CARDIAC PROCEDURE N/A 2024    Left heart cath / coronary angiography / possible percutaneous coronary intervention. performed by Bobby Franco DO at [facility]    CARDIAC PROCEDURE N/A 2024    Percutaneous coronary intervention performed by Bobby Franco DO at [facility]    CARDIAC PROCEDURE N/A 2024    Left heart cath / coronary angiography performed by Bobby Franco DO at [facility]    CARDIAC PROCEDURE N/A 2024    Percutaneous coronary intervention performed by Bobby Franco DO at [facility]    HYSTERECTOMY (CERVIX STATUS UNKNOWN)      TONSILLECTOMY         Immunization History:   Immunization History   Administered Date(s) Administered    COVID-19, PFIZER GRAY top, DO NOT Dilute, (age 12 y+), IM, 30 mcg/0.3 mL 2022    COVID-19, PFIZER PURPLE top, DILUTE for use, (age 12 y+), 30mcg/0.3mL 2021, 2021    Influenza, FLUZONE High Dose, (age 65 y+), IM, Trivalent PF, 0.5mL 2024    Pneumococcal, PCV20, PREVNAR 20, (age 6w+), IM, 0.5mL 2024    Pneumococcal, PPSV23, PNEUMOVAX 23, (age 2y+), SC/IM, 0.5mL 2022    TDaP, ADACEL (age

## 2025-01-31 NOTE — PLAN OF CARE
Problem: Discharge Planning  Goal: Discharge to home or other facility with appropriate resources  1/31/2025 0545 by Leidy Fry, RN  Outcome: Progressing  1/31/2025 0544 by Leidy Fry, RN  Outcome: Progressing     Problem: Safety - Adult  Goal: Free from fall injury  1/31/2025 0545 by Leidy Fry, RN  Outcome: Progressing  1/31/2025 0544 by Leidy Fry, RN  Outcome: Progressing

## 2025-02-03 ENCOUNTER — HOSPITAL ENCOUNTER (OUTPATIENT)
Dept: GENERAL RADIOLOGY | Age: 70
Discharge: HOME | End: 2025-02-05
Attending: INTERNAL MEDICINE
Payer: MEDICARE

## 2025-02-03 ENCOUNTER — OFFICE VISIT (OUTPATIENT)
Dept: CARDIOLOGY CLINIC | Age: 70
End: 2025-02-03
Payer: MEDICARE

## 2025-02-03 ENCOUNTER — PATIENT OUTREACH (OUTPATIENT)
Dept: PRIMARY CARE | Facility: CLINIC | Age: 70
End: 2025-02-03
Payer: MEDICARE

## 2025-02-03 ENCOUNTER — OFFICE VISIT (OUTPATIENT)
Dept: PULMONOLOGY | Age: 70
End: 2025-02-03
Payer: MEDICARE

## 2025-02-03 VITALS
HEART RATE: 84 BPM | BODY MASS INDEX: 29.29 KG/M2 | OXYGEN SATURATION: 95 % | SYSTOLIC BLOOD PRESSURE: 120 MMHG | WEIGHT: 176 LBS | DIASTOLIC BLOOD PRESSURE: 60 MMHG

## 2025-02-03 VITALS
HEIGHT: 65 IN | SYSTOLIC BLOOD PRESSURE: 138 MMHG | DIASTOLIC BLOOD PRESSURE: 78 MMHG | WEIGHT: 178 LBS | OXYGEN SATURATION: 98 % | BODY MASS INDEX: 29.66 KG/M2 | TEMPERATURE: 97.2 F | HEART RATE: 89 BPM | RESPIRATION RATE: 18 BRPM

## 2025-02-03 DIAGNOSIS — R79.89 ELEVATED BRAIN NATRIURETIC PEPTIDE (BNP) LEVEL: ICD-10-CM

## 2025-02-03 DIAGNOSIS — Z09 HOSPITAL DISCHARGE FOLLOW-UP: Primary | ICD-10-CM

## 2025-02-03 DIAGNOSIS — I51.89 DIASTOLIC DYSFUNCTION: ICD-10-CM

## 2025-02-03 DIAGNOSIS — G47.30 SLEEP APNEA, UNSPECIFIED TYPE: ICD-10-CM

## 2025-02-03 DIAGNOSIS — J90 PLEURAL EFFUSION: ICD-10-CM

## 2025-02-03 DIAGNOSIS — I46.9 CARDIOPULMONARY ARREST: ICD-10-CM

## 2025-02-03 DIAGNOSIS — R91.1 LUNG NODULE: ICD-10-CM

## 2025-02-03 DIAGNOSIS — I25.10 CAD S/P PERCUTANEOUS CORONARY ANGIOPLASTY: Primary | ICD-10-CM

## 2025-02-03 DIAGNOSIS — I10 HYPERTENSION, UNSPECIFIED TYPE: ICD-10-CM

## 2025-02-03 DIAGNOSIS — Z98.61 CAD S/P PERCUTANEOUS CORONARY ANGIOPLASTY: Primary | ICD-10-CM

## 2025-02-03 DIAGNOSIS — Z09 HOSPITAL DISCHARGE FOLLOW-UP: ICD-10-CM

## 2025-02-03 DIAGNOSIS — I24.9 ACUTE CORONARY SYNDROME (HCC): ICD-10-CM

## 2025-02-03 PROCEDURE — 1159F MED LIST DOCD IN RCRD: CPT | Performed by: INTERNAL MEDICINE

## 2025-02-03 PROCEDURE — 99213 OFFICE O/P EST LOW 20 MIN: CPT

## 2025-02-03 PROCEDURE — 1126F AMNT PAIN NOTED NONE PRSNT: CPT

## 2025-02-03 PROCEDURE — 3078F DIAST BP <80 MM HG: CPT

## 2025-02-03 PROCEDURE — 99214 OFFICE O/P EST MOD 30 MIN: CPT | Performed by: INTERNAL MEDICINE

## 2025-02-03 PROCEDURE — 76604 US EXAM CHEST: CPT | Performed by: INTERNAL MEDICINE

## 2025-02-03 PROCEDURE — 3074F SYST BP LT 130 MM HG: CPT

## 2025-02-03 PROCEDURE — 71046 X-RAY EXAM CHEST 2 VIEWS: CPT

## 2025-02-03 PROCEDURE — 1123F ACP DISCUSS/DSCN MKR DOCD: CPT

## 2025-02-03 PROCEDURE — 1123F ACP DISCUSS/DSCN MKR DOCD: CPT | Performed by: INTERNAL MEDICINE

## 2025-02-03 PROCEDURE — 1159F MED LIST DOCD IN RCRD: CPT

## 2025-02-03 PROCEDURE — 1111F DSCHRG MED/CURRENT MED MERGE: CPT | Performed by: INTERNAL MEDICINE

## 2025-02-03 RX ORDER — NITROGLYCERIN 0.4 MG/1
0.4 TABLET SUBLINGUAL EVERY 5 MIN PRN
Qty: 25 TABLET | Refills: 3 | Status: SHIPPED | OUTPATIENT
Start: 2025-02-03 | End: 2025-03-05

## 2025-02-03 RX ORDER — LOSARTAN POTASSIUM 50 MG/1
50 TABLET ORAL DAILY
Qty: 90 TABLET | Refills: 3 | Status: SHIPPED | OUTPATIENT
Start: 2025-02-03

## 2025-02-03 RX ORDER — ATORVASTATIN CALCIUM 40 MG/1
40 TABLET, FILM COATED ORAL NIGHTLY
Qty: 90 TABLET | Refills: 3 | Status: SHIPPED | OUTPATIENT
Start: 2025-02-03

## 2025-02-03 RX ORDER — FUROSEMIDE 20 MG/1
20 TABLET ORAL DAILY
Qty: 90 TABLET | Refills: 3 | Status: SHIPPED | OUTPATIENT
Start: 2025-02-03

## 2025-02-03 RX ORDER — METOPROLOL SUCCINATE 50 MG/1
50 TABLET, EXTENDED RELEASE ORAL DAILY
Qty: 90 TABLET | Refills: 3 | Status: SHIPPED | OUTPATIENT
Start: 2025-02-03

## 2025-02-03 NOTE — PROGRESS NOTES
Subjective:        Rekha Russ (:  1955) is a 69 y.o. female, Established patient, here for evaluation of the following chief complaint(s):  Follow-Up from Hospital (Lung Nodule)            Chief Complaint   Patient presents with    Follow-Up from Hospital     Lung Nodule       HPI  History of Present Illness       2/3/2025  Post recent hospital discharge, she was admitted for ST elevation MI, and cardiac arrest, CT scan showed 5 mm lung nodule.  Patient has drug-eluting stent to LAD x 2, recently hospitalized again with left-sided pleural effusion felt to be due to heart failure.  She is doing good, no chest pain, no shortness of breath, weight is stable, no heartburn, no nasal congestion postnasal drip.              Allergies:  Bactrim [sulfamethoxazole-trimethoprim] and Seasonal  Past Medical History:   Diagnosis Date    Hypertension     Trigeminal neuralgia pain      Past Surgical History:   Procedure Laterality Date    CARDIAC PROCEDURE N/A 2024    Left heart cath / coronary angiography / possible percutaneous coronary intervention. performed by Bobby Franco DO at [facility]    CARDIAC PROCEDURE N/A 2024    Percutaneous coronary intervention performed by Bobby Franco DO at [facility]    CARDIAC PROCEDURE N/A 2024    Left heart cath / coronary angiography performed by Bobby Franco DO at [facility]    CARDIAC PROCEDURE N/A 2024    Percutaneous coronary intervention performed by Bobby Franco DO at [facility]    HYSTERECTOMY (CERVIX STATUS UNKNOWN)      TONSILLECTOMY       No family history on file.  Social History     Socioeconomic History    Marital status: Single     Spouse name: Not on file    Number of children: Not on file    Years of education: Not on file    Highest education level: Not on file   Occupational History    Not on file   Tobacco Use    Smoking status: Former     Current packs/day: 0.00

## 2025-02-03 NOTE — TELEPHONE ENCOUNTER
Pt called in and states she needs a refill on everything, and have it sent to [facility] in [place].       Requesting medication refill. Please approve or deny this request.    Rx requested:  Requested Prescriptions     Pending Prescriptions Disp Refills    ticagrelor (BRILINTA) 90 MG TABS tablet 60 tablet 3     Sig: Take 1 tablet by mouth 2 times daily    nitroGLYCERIN (NITROSTAT) 0.4 MG SL tablet 25 tablet 3     Sig: Place 1 tablet under the tongue every 5 minutes as needed for Chest pain    metoprolol succinate (TOPROL XL) 50 MG extended release tablet 30 tablet 3     Sig: Take 1 tablet by mouth daily    losartan (COZAAR) 50 MG tablet 30 tablet 3     Sig: Take 1 tablet by mouth daily    furosemide (LASIX) 20 MG tablet 60 tablet 3     Sig: Take 1 tablet by mouth daily    atorvastatin (LIPITOR) 40 MG tablet 30 tablet 3     Sig: Take 1 tablet by mouth nightly         Last Office Visit:   2/3/2025      Next Visit Date:  Future Appointments   Date Time Provider Department Center   3/31/2025  1:00 PM Hayley Gaspar MD [facility]   8/7/2025  1:00 PM Bobby Franco DO [facility]

## 2025-02-03 NOTE — PROGRESS NOTES
Chief Complaint   Patient presents with    Follow-Up from Hospital       Patient presents for initial medical evaluation. Patient is followed on a regular basis by Glen Gomez MD.   Patient recently hospitalized for STEMI. She initially was hospitalized for NSTEMI on 12/26/2024 underwent LHC PCI of the proximal to mid LAD with drug-eluting stent 4.0 x 12 EFRAIN, POBA of the ostial diagonal 1, was in stable condition and sent home and then readmitted on 12/29/2024 after a cardiopulmonary arrest while in rehab at [facility] ROSC achieved after 5 minutes was then found to have STEMI transferred to [facility] as code purple  and underwent repeat LHC and had PCI of the proximal LAD with drug-eluting stents x 2   Was also recently at [facility] in ED treated for CHF exacerbation with IV lasix with positive response. CXR showed small to moderate left side pleural effusions.   History of Patient status post recent PCI at [facility] on 10/2024.  History of HTN    Echo on 12/30/2024 showing LVEF 55-60% with no major valv abn. Diastolic dysfunction.  Pt denies chest pain, dyspnea, dyspnea on exertion, change in exercise capacity, fatigue,  nausea, vomiting, diarrhea, constipation, motor weakness, insomnia, weight loss, syncope, dizziness, lightheadedness, palpitations, PND, orthopnea, or claudication.  Compliant with aspirin and Brilinta as well as statin , lasix             Patient Active Problem List   Diagnosis    Dysfunction of right eustachian tube    Cerumen debris on tympanic membrane of both ears    Acute abdominal pain    Abdominal bloating    Anemia    Chronic kidney disease, stage III (moderate) (HCC)    Constipation    Corn    Gastroesophageal reflux disease without esophagitis    Hypercalcemia    Morbid obesity    Nausea    Prediabetes    Hyperparathyroidism (HCC)    Chest pressure    Arm numbness left    Pulmonary embolism, unspecified chronicity, unspecified pulmonary embolism type, unspecified

## 2025-02-03 NOTE — PROGRESS NOTES
Discharge Facility: [facility]  Discharge Diagnosis: Pneumonia of left lower lobe   Admission Date: 1/28/2025 (readmit)  Discharge Date: 1/31/2025    PCP Appointment Date:  -Not scheduled d/t no contact; task sent to office clerical pool to assist with follow up    Specialist Appointment Date:   -2/3/2025 1145 pulmonology  -2/3/2025 1230 cardiology    Hospital Encounter and Summary Linked: Yes    Unable to reach patient x2 attempts, voicemail left with call back number.

## 2025-02-04 ENCOUNTER — TELEPHONE (OUTPATIENT)
Dept: CARDIOLOGY CLINIC | Age: 70
End: 2025-02-04

## 2025-02-04 NOTE — TELEPHONE ENCOUNTER
Pt wanted to double check if she has to take the Aspirin that was prescribed to her or can she get it over the counter.

## 2025-02-06 ENCOUNTER — TELEPHONE (OUTPATIENT)
Dept: PRIMARY CARE | Facility: CLINIC | Age: 70
End: 2025-02-06
Payer: MEDICARE

## 2025-02-06 NOTE — TELEPHONE ENCOUNTER
Patricia from [facility] called to let you know they resumed her home care today. She will be seen once a week for 1 week, twice a week for 2 weeks, and once a week for 1 week. Patricia reports she is doing better than she was prior to her recent hospital stay.    [phone]

## 2025-02-07 NOTE — TELEPHONE ENCOUNTER
Pt called in and wants to know if she needs the prescription for Asprin low dose tbec or if she can get it over the counter. Please advise

## 2025-02-10 ENCOUNTER — APPOINTMENT (OUTPATIENT)
Dept: ULTRASOUND IMAGING | Age: 70
End: 2025-02-10
Payer: MEDICARE

## 2025-02-10 ENCOUNTER — APPOINTMENT (OUTPATIENT)
Dept: GENERAL RADIOLOGY | Age: 70
End: 2025-02-10
Payer: MEDICARE

## 2025-02-10 ENCOUNTER — HOSPITAL ENCOUNTER (EMERGENCY)
Age: 70
Discharge: HOME | End: 2025-02-10
Attending: EMERGENCY MEDICINE
Payer: MEDICARE

## 2025-02-10 VITALS
HEART RATE: 95 BPM | TEMPERATURE: 98.1 F | RESPIRATION RATE: 16 BRPM | DIASTOLIC BLOOD PRESSURE: 89 MMHG | HEIGHT: 65 IN | SYSTOLIC BLOOD PRESSURE: 152 MMHG | OXYGEN SATURATION: 98 % | WEIGHT: 169 LBS | BODY MASS INDEX: 28.16 KG/M2

## 2025-02-10 DIAGNOSIS — K80.20 CALCULUS OF GALLBLADDER WITHOUT CHOLECYSTITIS WITHOUT OBSTRUCTION: ICD-10-CM

## 2025-02-10 DIAGNOSIS — Z76.0 ENCOUNTER FOR MEDICATION REFILL: Primary | ICD-10-CM

## 2025-02-10 LAB
AMORPH SED URNS QL MICRO: ABNORMAL
BACTERIA URNS QL MICRO: ABNORMAL /HPF
BILIRUB UR QL STRIP: NORMAL
CLARITY UR: CLEAR
COLOR UR: YELLOW
EKG ATRIAL RATE: 82 BPM
EKG P AXIS: 41 DEGREES
EKG P-R INTERVAL: 190 MS
EKG Q-T INTERVAL: 408 MS
EKG QRS DURATION: 90 MS
EKG QTC CALCULATION (BAZETT): 476 MS
EKG R AXIS: 24 DEGREES
EKG T AXIS: 108 DEGREES
EKG VENTRICULAR RATE: 82 BPM
EPI CELLS #/AREA URNS HPF: ABNORMAL /HPF
GLUCOSE UR STRIP-MCNC: NEGATIVE MG/DL
HGB UR QL STRIP: NORMAL
KETONES UR STRIP-MCNC: NEGATIVE MG/DL
LEUKOCYTE ESTERASE UR QL STRIP: NORMAL
NITRITE UR QL STRIP: NEGATIVE
PH UR STRIP: 6 [PH] (ref 5–9)
PROT UR STRIP-MCNC: NEGATIVE MG/DL
RBC #/AREA URNS HPF: ABNORMAL /HPF (ref 0–2)
SP GR UR STRIP: 1.02 (ref 1–1.03)
URINE REFLEX TO CULTURE: NORMAL
UROBILINOGEN UR STRIP-ACNC: 0.2 E.U./DL
WBC #/AREA URNS HPF: ABNORMAL /HPF (ref 0–5)

## 2025-02-10 PROCEDURE — 74022 RADEX COMPL AQT ABD SERIES: CPT

## 2025-02-10 PROCEDURE — 93005 ELECTROCARDIOGRAM TRACING: CPT

## 2025-02-10 PROCEDURE — 99285 EMERGENCY DEPT VISIT HI MDM: CPT

## 2025-02-10 PROCEDURE — 81001 URINALYSIS AUTO W/SCOPE: CPT

## 2025-02-10 PROCEDURE — 6370000000 HC RX 637 (ALT 250 FOR IP): Performed by: EMERGENCY MEDICINE

## 2025-02-10 PROCEDURE — 76705 ECHO EXAM OF ABDOMEN: CPT

## 2025-02-10 RX ORDER — OMEPRAZOLE 20 MG/1
20 CAPSULE, DELAYED RELEASE ORAL
Qty: 30 CAPSULE | Refills: 1 | Status: SHIPPED | OUTPATIENT
Start: 2025-02-10

## 2025-02-10 RX ADMIN — LIDOCAINE HYDROCHLORIDE: 20 SOLUTION ORAL at 13:52

## 2025-02-10 ASSESSMENT — ENCOUNTER SYMPTOMS
COUGH: 0
SORE THROAT: 0
TROUBLE SWALLOWING: 0
EYE DISCHARGE: 0
WHEEZING: 0
CONSTIPATION: 0
SINUS PRESSURE: 0
STRIDOR: 0
BACK PAIN: 0
DIARRHEA: 0
EYE PAIN: 0
CHOKING: 0
ABDOMINAL PAIN: 1
SHORTNESS OF BREATH: 1
EYE REDNESS: 0
BLOOD IN STOOL: 0
VOMITING: 0
FACIAL SWELLING: 0
CHEST TIGHTNESS: 0
VOICE CHANGE: 0

## 2025-02-10 ASSESSMENT — LIFESTYLE VARIABLES
HOW MANY STANDARD DRINKS CONTAINING ALCOHOL DO YOU HAVE ON A TYPICAL DAY: PATIENT DOES NOT DRINK
HOW OFTEN DO YOU HAVE A DRINK CONTAINING ALCOHOL: NEVER

## 2025-02-10 ASSESSMENT — PAIN - FUNCTIONAL ASSESSMENT: PAIN_FUNCTIONAL_ASSESSMENT: NONE - DENIES PAIN

## 2025-02-10 NOTE — ED PROVIDER NOTES
[facility] EMERGENCY DEPARTMENT  eMERGENCY dEPARTMENT eNCOUnter      Pt Name: Rekha Russ  MRN: [id number]  Birthdate 1955  Date of evaluation: 2/10/2025  Provider: Jazzmine Stacy MD    CHIEF COMPLAINT       Chief Complaint   Patient presents with    Shortness of Breath     Shortness of breath, with heart burn started 20 minutes ago.          HISTORY OF PRESENT ILLNESS   (Location/Symptom, Timing/Onset,Context/Setting, Quality, Duration, Modifying Factors, Severity)  Note limiting factors.   Rekha Russ is a 69 y.o. female who presents to the emergency department patient requesting a refill of Prilosec she had a chicken dinner last night and woke up with heartburn and some lower abdominal discomfort patient denies any cold cough congestion short of breath fever chills live with her  no one sick at home at this time has no nausea vomiting did not eat anything this morning patient has a history of intermittent CHF denies any swelling of the legs patient taking Lasix on a regular basis denies any chest tightness at this time    HPI    NursingNotes were reviewed.    REVIEW OF SYSTEMS    (2-9 systems for level 4, 10 or more for level 5)     Review of Systems   Constitutional: Negative.  Negative for activity change and fever.   HENT:  Negative for congestion, drooling, facial swelling, mouth sores, nosebleeds, sinus pressure, sore throat, trouble swallowing and voice change.    Eyes:  Negative for pain, discharge, redness and visual disturbance.   Respiratory:  Positive for shortness of breath. Negative for cough, choking, chest tightness, wheezing and stridor.    Cardiovascular:  Negative for chest pain, palpitations and leg swelling.   Gastrointestinal:  Positive for abdominal pain. Negative for blood in stool, constipation, diarrhea and vomiting.   Endocrine: Negative for cold intolerance, polyphagia and polyuria.   Genitourinary:  Negative for dysuria, flank pain, frequency, genital  sores and urgency.   Musculoskeletal:  Negative for back pain, joint swelling, neck pain and neck stiffness.   Skin:  Negative for pallor and rash.   Neurological:  Negative for tremors, seizures, syncope, weakness, numbness and headaches.   Hematological:  Negative for adenopathy. Does not bruise/bleed easily.   Psychiatric/Behavioral:  Negative for agitation, behavioral problems, hallucinations and sleep disturbance. The patient is nervous/anxious. The patient is not hyperactive.    All other systems reviewed and are negative.      Except as noted above the remainder of the review of systems was reviewed and negative.       PAST MEDICAL HISTORY     Past Medical History:   Diagnosis Date    Hypertension     Trigeminal neuralgia pain          SURGICALHISTORY       Past Surgical History:   Procedure Laterality Date    CARDIAC PROCEDURE N/A 12/26/2024    Left heart cath / coronary angiography / possible percutaneous coronary intervention. performed by Bobby Franco DO at [facility] CARDIAC CATH LAB    CARDIAC PROCEDURE N/A 12/26/2024    Percutaneous coronary intervention performed by Bobby Franco DO at [facility] CARDIAC CATH LAB    CARDIAC PROCEDURE N/A 12/29/2024    Left heart cath / coronary angiography performed by Bobby Franco DO at [facility] CARDIAC CATH LAB    CARDIAC PROCEDURE N/A 12/29/2024    Percutaneous coronary intervention performed by Bobby Franco DO at [facility] CARDIAC CATH LAB    HYSTERECTOMY (CERVIX STATUS UNKNOWN)      TONSILLECTOMY           CURRENT MEDICATIONS       Previous Medications    ASPIRIN 81 MG EC TABLET    Take 1 tablet by mouth daily    ATORVASTATIN (LIPITOR) 40 MG TABLET    Take 1 tablet by mouth nightly    FAMOTIDINE (PEPCID) 20 MG TABLET    Take 1 tablet by mouth 2 times daily    FUROSEMIDE (LASIX) 20 MG TABLET    Take 1 tablet by mouth daily    LOSARTAN (COZAAR) 50 MG TABLET    Take 1 tablet by mouth daily    METOPROLOL SUCCINATE (TOPROL XL) 50 MG EXTENDED RELEASE TABLET    Take 1 tablet by  152/89   Pulse: 95   Resp: 16   Temp: 98.1 °F (36.7 °C)   TempSrc: Oral   SpO2: 98%   Weight: 76.7 kg (169 lb)   Height: 1.651 m (5' 5\")           MDM  Number of Diagnoses or Management Options  Calculus of gallbladder without cholecystitis without obstruction  Encounter for medication refill  Diagnosis management comments: Patient with severe heartburn become anxious and short of breath only few hours ago had some chicken dinner last night no alcohol patient was given Prilosec requesting refill of Prilosec minimal tenderness epigastric area on examination no rebound tenderness no Tirado sign x-ray suggestive of calcification and suspected gallstones ultrasound was performed and shows solitary gallstone without any complication patient EKG was also performed to make sure no atrial fibrillation caught contributing to her problem so sinus rhythm with nonspecific T wave abnormality rate of 82/min QT interval 4 8 ms no PVCs no ischemia no ST elevation EKG patient be referred to surgeon on-call for outpatient evaluation of her symptoms and gallbladder issue Dr. Zazueta is on-call today patient is aware of situation as a patient and she will call for further follow        CRITICAL CARE TIME   Total Critical Care time was minutes, excluding separately reportableprocedures.  There was a high probability of clinicallysignificant/life threatening deterioration in the patient's condition which required my urgent intervention.    CONSULTS:  None    PROCEDURES:  Unless otherwise noted below, none     Procedures    FINAL IMPRESSION      1. Encounter for medication refill    2. Calculus of gallbladder without cholecystitis without obstruction          DISPOSITION/PLAN   DISPOSITION                 PATIENT REFERRED TO:  Glen Cat MD  [address], [address]  [place] [address] [phone]    In 2 days  If symptoms worsen    Cary Zazueta MD  [address]  [address]  [facility] [place] [facility] [address] [phone]    In 2

## 2025-02-10 NOTE — ED TRIAGE NOTES
Patient presents to ED with anxiety and sob and heartburn. Reports anxiety is chronic and has not been prescribed anything for it. Also has  been seen /treated for SOB over the past few weeks She reports she ran out of pepcid yesterday

## 2025-02-13 ENCOUNTER — PATIENT OUTREACH (OUTPATIENT)
Dept: PRIMARY CARE | Facility: CLINIC | Age: 70
End: 2025-02-13
Payer: MEDICARE

## 2025-02-18 ENCOUNTER — PATIENT OUTREACH (OUTPATIENT)
Dept: PRIMARY CARE | Facility: CLINIC | Age: 70
End: 2025-02-18
Payer: MEDICARE

## 2025-02-18 NOTE — PROGRESS NOTES
Discharge Facility: [facility]  Discharge Diagnosis: Chest/Abdominal pain   Admission Date: 2/14/2025  Discharge Date: 2/15/2025    PCP Appointment Date: none- 10/16/2025  Specialist Appointment Date:   -pulmonology 3/31/2025  -cardiology 4/10/2025, 8/7/2025  -neurology 4/28/2025    Hospital Encounter and Summary Linked: Yes    Discharge instructions:  1) You need follow up with your PCP and pulmonary as your chest X ray is abnormal and there may be a lung nodule  2) Increase your Losartan dose to 100 mg per day  3) Famotidine daily  4) Continue your other usual medications as prescribed  5) Follow up with PCP, gastroenterology and Pulmonary     Two attempts were made to reach patient within two business days after discharge. Voicemail left with contact information for patient to call back with any non-emergent questions or concerns.

## 2025-02-28 ENCOUNTER — PATIENT OUTREACH (OUTPATIENT)
Dept: PRIMARY CARE | Facility: CLINIC | Age: 70
End: 2025-02-28
Payer: MEDICARE

## 2025-02-28 ENCOUNTER — HOSPITAL ENCOUNTER (EMERGENCY)
Age: 70
Discharge: HOME | End: 2025-02-28
Attending: INTERNAL MEDICINE
Payer: MEDICARE

## 2025-02-28 ENCOUNTER — APPOINTMENT (OUTPATIENT)
Dept: GENERAL RADIOLOGY | Age: 70
End: 2025-02-28
Payer: MEDICARE

## 2025-02-28 VITALS
OXYGEN SATURATION: 97 % | BODY MASS INDEX: 27.99 KG/M2 | HEIGHT: 65 IN | DIASTOLIC BLOOD PRESSURE: 90 MMHG | SYSTOLIC BLOOD PRESSURE: 112 MMHG | RESPIRATION RATE: 18 BRPM | WEIGHT: 168 LBS | HEART RATE: 73 BPM | TEMPERATURE: 97.7 F

## 2025-02-28 DIAGNOSIS — K21.9 GASTROESOPHAGEAL REFLUX DISEASE, UNSPECIFIED WHETHER ESOPHAGITIS PRESENT: Primary | ICD-10-CM

## 2025-02-28 DIAGNOSIS — R07.89 CHEST DISCOMFORT: ICD-10-CM

## 2025-02-28 LAB
ALBUMIN SERPL-MCNC: 4.5 G/DL (ref 3.5–4.6)
ALP SERPL-CCNC: 136 U/L (ref 40–130)
ALT SERPL-CCNC: 10 U/L (ref 0–33)
ANION GAP SERPL CALCULATED.3IONS-SCNC: 11 MEQ/L (ref 9–15)
AST SERPL-CCNC: 15 U/L (ref 0–35)
BASOPHILS # BLD: 0 K/UL (ref 0–0.1)
BASOPHILS NFR BLD: 0.3 % (ref 0.1–1.2)
BILIRUB SERPL-MCNC: 0.3 MG/DL (ref 0.2–0.7)
BNP BLD-MCNC: 1932 PG/ML
BUN SERPL-MCNC: 24 MG/DL (ref 8–23)
CALCIUM SERPL-MCNC: 10.6 MG/DL (ref 8.5–9.9)
CHLORIDE SERPL-SCNC: 102 MEQ/L (ref 95–107)
CO2 SERPL-SCNC: 26 MEQ/L (ref 20–31)
CREAT SERPL-MCNC: 0.83 MG/DL (ref 0.5–0.9)
EOSINOPHIL # BLD: 0.1 K/UL (ref 0–0.4)
EOSINOPHIL NFR BLD: 1.6 % (ref 0.7–5.8)
ERYTHROCYTE [DISTWIDTH] IN BLOOD BY AUTOMATED COUNT: 13.1 % (ref 11.7–14.4)
GLOBULIN SER CALC-MCNC: 3.1 G/DL (ref 2.3–3.5)
GLUCOSE SERPL-MCNC: 139 MG/DL (ref 70–99)
HCT VFR BLD AUTO: 36.8 % (ref 37–47)
HGB BLD-MCNC: 12 G/DL (ref 11.2–15.7)
IMM GRANULOCYTES # BLD: 0 K/UL
IMM GRANULOCYTES NFR BLD: 0.2 %
LIPASE SERPL-CCNC: 44 U/L (ref 12–95)
LYMPHOCYTES # BLD: 2.2 K/UL (ref 1.2–3.7)
LYMPHOCYTES NFR BLD: 33.9 %
MAGNESIUM SERPL-MCNC: 2.2 MG/DL (ref 1.7–2.4)
MCH RBC QN AUTO: 30.2 PG (ref 25.6–32.2)
MCHC RBC AUTO-ENTMCNC: 32.6 % (ref 32.2–35.5)
MCV RBC AUTO: 92.7 FL (ref 79.4–94.8)
MONOCYTES # BLD: 0.7 K/UL (ref 0.2–0.9)
MONOCYTES NFR BLD: 11.5 % (ref 4.7–12.5)
NEUTROPHILS # BLD: 3.3 K/UL (ref 1.6–6.1)
NEUTS SEG NFR BLD: 52.5 % (ref 34–71.1)
PLATELET # BLD AUTO: 256 K/UL (ref 182–369)
POTASSIUM SERPL-SCNC: 3.7 MEQ/L (ref 3.4–4.9)
PROT SERPL-MCNC: 7.6 G/DL (ref 6.3–8)
RBC # BLD AUTO: 3.97 M/UL (ref 3.93–5.22)
SODIUM SERPL-SCNC: 139 MEQ/L (ref 135–144)
TROPONIN, HIGH SENSITIVITY: 47 NG/L (ref 0–19)
TROPONIN, HIGH SENSITIVITY: 49 NG/L (ref 0–19)
TROPONIN, HIGH SENSITIVITY: 50 NG/L (ref 0–19)
WBC # BLD AUTO: 6.3 K/UL (ref 4–10)

## 2025-02-28 PROCEDURE — 83735 ASSAY OF MAGNESIUM: CPT

## 2025-02-28 PROCEDURE — 83690 ASSAY OF LIPASE: CPT

## 2025-02-28 PROCEDURE — 99285 EMERGENCY DEPT VISIT HI MDM: CPT

## 2025-02-28 PROCEDURE — 71046 X-RAY EXAM CHEST 2 VIEWS: CPT

## 2025-02-28 PROCEDURE — 93005 ELECTROCARDIOGRAM TRACING: CPT

## 2025-02-28 PROCEDURE — 36415 COLL VENOUS BLD VENIPUNCTURE: CPT

## 2025-02-28 PROCEDURE — 2500000003 HC RX 250 WO HCPCS: Performed by: INTERNAL MEDICINE

## 2025-02-28 PROCEDURE — 84484 ASSAY OF TROPONIN QUANT: CPT

## 2025-02-28 PROCEDURE — 2580000003 HC RX 258: Performed by: INTERNAL MEDICINE

## 2025-02-28 PROCEDURE — 85025 COMPLETE CBC W/AUTO DIFF WBC: CPT

## 2025-02-28 PROCEDURE — 6370000000 HC RX 637 (ALT 250 FOR IP): Performed by: INTERNAL MEDICINE

## 2025-02-28 PROCEDURE — 80053 COMPREHEN METABOLIC PANEL: CPT

## 2025-02-28 PROCEDURE — 96374 THER/PROPH/DIAG INJ IV PUSH: CPT

## 2025-02-28 PROCEDURE — 83880 ASSAY OF NATRIURETIC PEPTIDE: CPT

## 2025-02-28 RX ORDER — SUCRALFATE 1 G/1
1 TABLET ORAL 3 TIMES DAILY
Qty: 30 TABLET | Refills: 0 | Status: SHIPPED | OUTPATIENT
Start: 2025-02-28 | End: 2025-03-10

## 2025-02-28 RX ORDER — ATORVASTATIN CALCIUM 40 MG/1
40 TABLET, FILM COATED ORAL ONCE
Status: COMPLETED | OUTPATIENT
Start: 2025-02-28 | End: 2025-02-28

## 2025-02-28 RX ORDER — FAMOTIDINE 20 MG/1
20 TABLET, FILM COATED ORAL EVERY EVENING
Qty: 30 TABLET | Refills: 0 | Status: SHIPPED | OUTPATIENT
Start: 2025-02-28 | End: 2025-03-30

## 2025-02-28 RX ORDER — PANTOPRAZOLE SODIUM 40 MG/1
40 TABLET, DELAYED RELEASE ORAL
Qty: 30 TABLET | Refills: 0 | Status: SHIPPED | OUTPATIENT
Start: 2025-02-28

## 2025-02-28 RX ORDER — OXCARBAZEPINE 150 MG/1
300 TABLET, FILM COATED ORAL ONCE
Status: COMPLETED | OUTPATIENT
Start: 2025-02-28 | End: 2025-02-28

## 2025-02-28 RX ADMIN — LIDOCAINE HYDROCHLORIDE: 20 SOLUTION ORAL at 19:44

## 2025-02-28 RX ADMIN — ATORVASTATIN CALCIUM 40 MG: 40 TABLET, FILM COATED ORAL at 22:18

## 2025-02-28 RX ADMIN — TICAGRELOR 90 MG: 90 TABLET ORAL at 22:18

## 2025-02-28 RX ADMIN — FAMOTIDINE 20 MG: 10 INJECTION, SOLUTION INTRAVENOUS at 19:44

## 2025-02-28 RX ADMIN — OXCARBAZEPINE 300 MG: 150 TABLET, FILM COATED ORAL at 22:18

## 2025-02-28 ASSESSMENT — PAIN SCALES - GENERAL: PAINLEVEL_OUTOF10: 4

## 2025-02-28 ASSESSMENT — PAIN DESCRIPTION - DESCRIPTORS: DESCRIPTORS: BURNING

## 2025-02-28 ASSESSMENT — ENCOUNTER SYMPTOMS
DIARRHEA: 0
NAUSEA: 0
COUGH: 0
ABDOMINAL PAIN: 0
BACK PAIN: 0
SHORTNESS OF BREATH: 0
SORE THROAT: 0
VOMITING: 0
EYE PAIN: 0
RHINORRHEA: 0

## 2025-02-28 ASSESSMENT — PAIN DESCRIPTION - FREQUENCY: FREQUENCY: CONTINUOUS

## 2025-02-28 ASSESSMENT — PAIN DESCRIPTION - PAIN TYPE: TYPE: ACUTE PAIN

## 2025-02-28 ASSESSMENT — PAIN DESCRIPTION - ONSET: ONSET: SUDDEN

## 2025-02-28 ASSESSMENT — PAIN - FUNCTIONAL ASSESSMENT: PAIN_FUNCTIONAL_ASSESSMENT: 0-10

## 2025-02-28 ASSESSMENT — PAIN DESCRIPTION - LOCATION: LOCATION: STERNUM

## 2025-03-01 NOTE — ED TRIAGE NOTES
Patient c/o of heartburn for x4 hrs today. Patient states she ate before the pain started. Patient arrives a/ox 3, ambulatory, appropriate to age. Patient states hx of cardiac arrest.

## 2025-03-01 NOTE — DISCHARGE INSTRUCTIONS
Please follow-up with cardiology and gastroenterology as outpatient.  Please return to the emergency department if having recurrent symptoms or other concerns.

## 2025-03-01 NOTE — ED PROVIDER NOTES
[facility] EMERGENCY DEPARTMENT  EMERGENCY DEPARTMENT ENCOUNTER      Pt Name: Rekha Russ  MRN: [id number]  Birthdate 1955  Date of evaluation: 2/28/2025  Provider: Lj Burks DO  7:29 PM EST    CHIEF COMPLAINT       Chief Complaint   Patient presents with    Gastroesophageal Reflux     Epigastric pain. Hx of cardiac arrest.         HISTORY OF PRESENT ILLNESS   (Location/Symptom, Timing/Onset, Context/Setting, Quality, Duration, Modifying Factors, Severity)  Note limiting factors.   Rekha Russ is a 70 y.o. female who presents to the emergency department for heartburn.     Patient presented for evaluation of heartburn.  Patient states she has had intermittent issues with heartburn but today at around 3 PM she noted worsening heartburn.  Patient states she feels it radiate up and down her chest.  Patient states she takes famotidine 40 mg once daily for heartburn.  Patient is no longer taking omeprazole or Protonix.  Patient denies vomiting or diarrhea.  Denies shortness of breath.  Denies cough.  Patient indicates she wishes to have the heartburn evaluated because she is a cardiac patient wants to make sure it is not her heart.  Patient notes that the burning in her chest was worsened after eating.  Patient denies eating any spicy food or anything out of the ordinary.  Patient notes she does drink orange juice regularly    The history is provided by the patient.       Nursing Notes were reviewed.    REVIEW OF SYSTEMS    (2-9 systems for level 4, 10 or more for level 5)     Review of Systems   Constitutional:  Negative for chills and fever.   HENT:  Negative for rhinorrhea and sore throat.    Eyes:  Negative for pain and visual disturbance.   Respiratory:  Negative for cough and shortness of breath.    Cardiovascular:  Positive for chest pain. Negative for palpitations.        Chest Pain in the form of heart burn   Gastrointestinal:  Negative for abdominal pain, diarrhea, nausea and  program.  Efforts were made to edit the dictations but occasionally words are mis-transcribed.)    Lj Burks DO (electronically signed)  Attending Emergency Physician    Supervising Attending Physician: Dr. Burks.     Lj Burks DO  02/28/25 1203

## 2025-03-02 LAB
EKG ATRIAL RATE: 71 BPM
EKG P AXIS: 26 DEGREES
EKG P-R INTERVAL: 180 MS
EKG Q-T INTERVAL: 420 MS
EKG QRS DURATION: 98 MS
EKG QTC CALCULATION (BAZETT): 456 MS
EKG R AXIS: 35 DEGREES
EKG T AXIS: 107 DEGREES
EKG VENTRICULAR RATE: 71 BPM

## 2025-03-02 PROCEDURE — 93010 ELECTROCARDIOGRAM REPORT: CPT | Performed by: INTERNAL MEDICINE

## 2025-03-03 ENCOUNTER — HOSPITAL ENCOUNTER (EMERGENCY)
Age: 70
Discharge: HOME | End: 2025-03-03
Payer: MEDICARE

## 2025-03-03 ENCOUNTER — APPOINTMENT (OUTPATIENT)
Dept: GENERAL RADIOLOGY | Age: 70
End: 2025-03-03
Payer: MEDICARE

## 2025-03-03 VITALS
SYSTOLIC BLOOD PRESSURE: 155 MMHG | OXYGEN SATURATION: 97 % | HEART RATE: 65 BPM | RESPIRATION RATE: 16 BRPM | DIASTOLIC BLOOD PRESSURE: 65 MMHG

## 2025-03-03 DIAGNOSIS — R10.13 ABDOMINAL PAIN, EPIGASTRIC: ICD-10-CM

## 2025-03-03 DIAGNOSIS — R42 LIGHTHEADEDNESS: Primary | ICD-10-CM

## 2025-03-03 LAB
ALBUMIN SERPL-MCNC: 4.4 G/DL (ref 3.5–4.6)
ALP SERPL-CCNC: 123 U/L (ref 40–130)
ALT SERPL-CCNC: 11 U/L (ref 0–33)
ANION GAP SERPL CALCULATED.3IONS-SCNC: 12 MEQ/L (ref 9–15)
AST SERPL-CCNC: 18 U/L (ref 0–35)
BASOPHILS # BLD: 0 K/UL (ref 0–0.2)
BASOPHILS NFR BLD: 0.3 %
BILIRUB SERPL-MCNC: 0.4 MG/DL (ref 0.2–0.7)
BNP BLD-MCNC: 1250 PG/ML
BUN SERPL-MCNC: 17 MG/DL (ref 8–23)
CALCIUM SERPL-MCNC: 10.2 MG/DL (ref 8.5–9.9)
CHLORIDE SERPL-SCNC: 98 MEQ/L (ref 95–107)
CO2 SERPL-SCNC: 27 MEQ/L (ref 20–31)
CREAT SERPL-MCNC: 0.89 MG/DL (ref 0.5–0.9)
EKG ATRIAL RATE: 73 BPM
EKG P AXIS: 38 DEGREES
EKG P-R INTERVAL: 196 MS
EKG Q-T INTERVAL: 384 MS
EKG QRS DURATION: 96 MS
EKG QTC CALCULATION (BAZETT): 423 MS
EKG R AXIS: 43 DEGREES
EKG T AXIS: 123 DEGREES
EKG VENTRICULAR RATE: 73 BPM
EOSINOPHIL # BLD: 0.1 K/UL (ref 0–0.7)
EOSINOPHIL NFR BLD: 1.1 %
ERYTHROCYTE [DISTWIDTH] IN BLOOD BY AUTOMATED COUNT: 13.1 % (ref 11.5–14.5)
GLOBULIN SER CALC-MCNC: 3 G/DL (ref 2.3–3.5)
GLUCOSE SERPL-MCNC: 147 MG/DL (ref 70–99)
HCT VFR BLD AUTO: 35.5 % (ref 37–47)
HGB BLD-MCNC: 12.2 G/DL (ref 12–16)
LYMPHOCYTES # BLD: 1.3 K/UL (ref 1–4.8)
LYMPHOCYTES NFR BLD: 21.2 %
MAGNESIUM SERPL-MCNC: 2.2 MG/DL (ref 1.7–2.4)
MCH RBC QN AUTO: 31.2 PG (ref 27–31.3)
MCHC RBC AUTO-ENTMCNC: 34.4 % (ref 33–37)
MCV RBC AUTO: 90.8 FL (ref 79.4–94.8)
MONOCYTES # BLD: 0.5 K/UL (ref 0.2–0.8)
MONOCYTES NFR BLD: 8.1 %
NEUTROPHILS # BLD: 4.3 K/UL (ref 1.4–6.5)
NEUTS SEG NFR BLD: 69 %
PLATELET # BLD AUTO: 249 K/UL (ref 130–400)
POTASSIUM SERPL-SCNC: 3.8 MEQ/L (ref 3.4–4.9)
PROT SERPL-MCNC: 7.4 G/DL (ref 6.3–8)
RBC # BLD AUTO: 3.91 M/UL (ref 4.2–5.4)
SODIUM SERPL-SCNC: 137 MEQ/L (ref 135–144)
TROPONIN, HIGH SENSITIVITY: 36 NG/L (ref 0–19)
WBC # BLD AUTO: 6.2 K/UL (ref 4.8–10.8)

## 2025-03-03 PROCEDURE — 71045 X-RAY EXAM CHEST 1 VIEW: CPT

## 2025-03-03 PROCEDURE — 85025 COMPLETE CBC W/AUTO DIFF WBC: CPT

## 2025-03-03 PROCEDURE — 84484 ASSAY OF TROPONIN QUANT: CPT

## 2025-03-03 PROCEDURE — 83880 ASSAY OF NATRIURETIC PEPTIDE: CPT

## 2025-03-03 PROCEDURE — 99285 EMERGENCY DEPT VISIT HI MDM: CPT

## 2025-03-03 PROCEDURE — 80053 COMPREHEN METABOLIC PANEL: CPT

## 2025-03-03 PROCEDURE — 83735 ASSAY OF MAGNESIUM: CPT

## 2025-03-03 PROCEDURE — 36415 COLL VENOUS BLD VENIPUNCTURE: CPT

## 2025-03-03 PROCEDURE — 93005 ELECTROCARDIOGRAM TRACING: CPT | Performed by: EMERGENCY MEDICINE

## 2025-03-03 PROCEDURE — 93010 ELECTROCARDIOGRAM REPORT: CPT | Performed by: INTERNAL MEDICINE

## 2025-03-03 ASSESSMENT — ENCOUNTER SYMPTOMS
VOMITING: 0
SORE THROAT: 0
PHOTOPHOBIA: 0
NAUSEA: 0
EYE PAIN: 0
RHINORRHEA: 0
ABDOMINAL PAIN: 1
DIARRHEA: 0
BACK PAIN: 0
COUGH: 0
SHORTNESS OF BREATH: 0

## 2025-03-03 NOTE — ED TRIAGE NOTES
Pt was riding her stationary bike for approximately 25 minutes and became weak with dizziness. Pt has a history of cardiac arrest on Dec 31, 2024 with stent placement at the time. Pt says every since this event she feels anxious and every little things makes her think there may be something wrong with her health. Pt denies HI or SI. Pt AOX4, VSS.

## 2025-03-03 NOTE — ED PROVIDER NOTES
morning (before breakfast)    SUCRALFATE (CARAFATE) 1 GM TABLET    Take 1 tablet by mouth in the morning, at noon, and at bedtime for 10 days    TICAGRELOR (BRILINTA) 90 MG TABS TABLET    Take 1 tablet by mouth 2 times daily       ALLERGIES     Bactrim [sulfamethoxazole-trimethoprim] and Seasonal    FAMILY HISTORY     No family history on file.       SOCIAL HISTORY       Social History     Socioeconomic History    Marital status: Single   Tobacco Use    Smoking status: Former     Current packs/day: 0.00     Average packs/day: 0.5 packs/day for 35.0 years (17.5 ttl pk-yrs)     Types: Cigarettes     Start date:      Quit date:      Years since quittin.1    Smokeless tobacco: Never   Vaping Use    Vaping status: Never Used   Substance and Sexual Activity    Alcohol use: No    Drug use: No    Sexual activity: Not Currently     Partners: Male     Social Determinants of Health     Financial Resource Strain: Patient Declined (2024)    Overall Financial Resource Strain (CARDIA)     Difficulty of Paying Living Expenses: Patient declined   Food Insecurity: No Food Insecurity (2025)    Hunger Vital Sign     Worried About Running Out of Food in the Last Year: Never true     Ran Out of Food in the Last Year: Never true   Transportation Needs: No Transportation Needs (2025)    PRAPARE - Transportation     Lack of Transportation (Medical): No     Lack of Transportation (Non-Medical): No   Housing Stability: Low Risk  (2025)    Housing Stability Vital Sign     Unable to Pay for Housing in the Last Year: No     Number of Times Moved in the Last Year: 1     Homeless in the Last Year: No       SCREENINGS         Lucas Coma Scale  Eye Opening: Spontaneous  Best Verbal Response: Oriented  Best Motor Response: Obeys commands  Lucas Coma Scale Score: 15                     WA Assessment  BP: (!) 155/65  Pulse: 65                 PHYSICAL EXAM    (up to 7 for level 4, 8 or more for level 5)     ED

## 2025-03-11 ENCOUNTER — TELEPHONE (OUTPATIENT)
Dept: PRIMARY CARE | Facility: CLINIC | Age: 70
End: 2025-03-11
Payer: MEDICARE

## 2025-03-11 NOTE — TELEPHONE ENCOUNTER
Pt states she had a UTI back in December while at the ER.   She wanted to know if her urine was clear after that.   I told her the one from Feb looked like it.   She states she is having some burning and going to the Urgent Care by her house tomorrow

## 2025-04-14 ENCOUNTER — APPOINTMENT (OUTPATIENT)
Dept: PRIMARY CARE | Facility: CLINIC | Age: 70
End: 2025-04-14
Payer: MEDICARE

## 2025-04-14 VITALS
HEART RATE: 70 BPM | DIASTOLIC BLOOD PRESSURE: 81 MMHG | HEIGHT: 61 IN | RESPIRATION RATE: 14 BRPM | BODY MASS INDEX: 30.4 KG/M2 | TEMPERATURE: 97.9 F | SYSTOLIC BLOOD PRESSURE: 136 MMHG | WEIGHT: 161 LBS

## 2025-04-14 DIAGNOSIS — R73.9 HYPERGLYCEMIA: ICD-10-CM

## 2025-04-14 DIAGNOSIS — F41.9 ANXIETY: Primary | ICD-10-CM

## 2025-04-14 DIAGNOSIS — I25.10 CORONARY ARTERY DISEASE INVOLVING NATIVE CORONARY ARTERY OF NATIVE HEART WITHOUT ANGINA PECTORIS: ICD-10-CM

## 2025-04-14 DIAGNOSIS — K21.9 GASTROESOPHAGEAL REFLUX DISEASE WITHOUT ESOPHAGITIS: ICD-10-CM

## 2025-04-14 DIAGNOSIS — E11.9 TYPE 2 DIABETES MELLITUS WITHOUT COMPLICATION, WITHOUT LONG-TERM CURRENT USE OF INSULIN: ICD-10-CM

## 2025-04-14 PROBLEM — E66.01 OBESITY, MORBID (MULTI): Status: RESOLVED | Noted: 2023-06-12 | Resolved: 2025-04-14

## 2025-04-14 LAB — POC HEMOGLOBIN A1C: 6.3 % (ref 4.2–6.5)

## 2025-04-14 PROCEDURE — 1123F ACP DISCUSS/DSCN MKR DOCD: CPT | Performed by: FAMILY MEDICINE

## 2025-04-14 PROCEDURE — 3075F SYST BP GE 130 - 139MM HG: CPT | Performed by: FAMILY MEDICINE

## 2025-04-14 PROCEDURE — 99214 OFFICE O/P EST MOD 30 MIN: CPT | Performed by: FAMILY MEDICINE

## 2025-04-14 PROCEDURE — 3008F BODY MASS INDEX DOCD: CPT | Performed by: FAMILY MEDICINE

## 2025-04-14 PROCEDURE — 3079F DIAST BP 80-89 MM HG: CPT | Performed by: FAMILY MEDICINE

## 2025-04-14 PROCEDURE — 3044F HG A1C LEVEL LT 7.0%: CPT | Performed by: FAMILY MEDICINE

## 2025-04-14 PROCEDURE — 1159F MED LIST DOCD IN RCRD: CPT | Performed by: FAMILY MEDICINE

## 2025-04-14 PROCEDURE — 83036 HEMOGLOBIN GLYCOSYLATED A1C: CPT | Performed by: FAMILY MEDICINE

## 2025-04-14 PROCEDURE — 4010F ACE/ARB THERAPY RXD/TAKEN: CPT | Performed by: FAMILY MEDICINE

## 2025-04-14 PROCEDURE — G2211 COMPLEX E/M VISIT ADD ON: HCPCS | Performed by: FAMILY MEDICINE

## 2025-04-14 PROCEDURE — 1036F TOBACCO NON-USER: CPT | Performed by: FAMILY MEDICINE

## 2025-04-14 RX ORDER — METOPROLOL SUCCINATE 50 MG/1
1 TABLET, EXTENDED RELEASE ORAL DAILY
COMMUNITY
Start: 2025-02-03

## 2025-04-14 RX ORDER — BUSPIRONE HYDROCHLORIDE 5 MG/1
5 TABLET ORAL 2 TIMES DAILY
Qty: 60 TABLET | Refills: 3 | Status: SHIPPED | OUTPATIENT
Start: 2025-04-14 | End: 2026-04-14

## 2025-04-14 RX ORDER — FUROSEMIDE 20 MG/1
20 TABLET ORAL DAILY
COMMUNITY

## 2025-04-14 RX ORDER — FAMOTIDINE 40 MG/1
1 TABLET, FILM COATED ORAL DAILY PRN
COMMUNITY
Start: 2025-04-11 | End: 2025-10-08

## 2025-04-14 RX ORDER — NITROGLYCERIN 0.4 MG/1
1 TABLET SUBLINGUAL EVERY 5 MIN PRN
COMMUNITY
Start: 2024-10-05

## 2025-04-14 RX ORDER — ASPIRIN 81 MG/1
1 TABLET ORAL
COMMUNITY
Start: 2025-02-10

## 2025-04-14 ASSESSMENT — ANXIETY QUESTIONNAIRES
2. NOT BEING ABLE TO STOP OR CONTROL WORRYING: NEARLY EVERY DAY
GAD7 TOTAL SCORE: 19
6. BECOMING EASILY ANNOYED OR IRRITABLE: MORE THAN HALF THE DAYS
7. FEELING AFRAID AS IF SOMETHING AWFUL MIGHT HAPPEN: NEARLY EVERY DAY
5. BEING SO RESTLESS THAT IT IS HARD TO SIT STILL: MORE THAN HALF THE DAYS
4. TROUBLE RELAXING: NEARLY EVERY DAY
1. FEELING NERVOUS, ANXIOUS, OR ON EDGE: NEARLY EVERY DAY
IF YOU CHECKED OFF ANY PROBLEMS ON THIS QUESTIONNAIRE, HOW DIFFICULT HAVE THESE PROBLEMS MADE IT FOR YOU TO DO YOUR WORK, TAKE CARE OF THINGS AT HOME, OR GET ALONG WITH OTHER PEOPLE: SOMEWHAT DIFFICULT
3. WORRYING TOO MUCH ABOUT DIFFERENT THINGS: NEARLY EVERY DAY

## 2025-04-14 ASSESSMENT — ENCOUNTER SYMPTOMS
INSOMNIA: 1
NERVOUS/ANXIOUS: 1

## 2025-04-14 NOTE — ASSESSMENT & PLAN NOTE
Several of her emergency room trips did turn out to be secondary to acid reflux.  She has been following with gastroenterology.  She is currently taking a PPI which is giving her much better symptom control

## 2025-04-14 NOTE — PROGRESS NOTES
Subjective   Willa Fox is a 70 y.o. female who presents for Hospital Follow-up and Anxiety.    She is here to follow up on multiple ER visits and hospital stays since her last appt here in 10/2024.     Anxiety  Presents for initial visit. Onset was 1 to 6 months ago. The problem has been gradually worsening. Symptoms include excessive worry, insomnia and nervous/anxious behavior. Exacerbated by: recent health problems. The quality of sleep is poor.         Visit Vitals  /81   Pulse 70   Temp 36.6 °C (97.9 °F)   Resp 14      Objective   Physical Exam  Constitutional:       Appearance: Normal appearance.   HENT:      Head: Normocephalic.   Pulmonary:      Effort: Pulmonary effort is normal.   Musculoskeletal:      Cervical back: Neck supple.   Skin:     General: Skin is warm and dry.   Psychiatric:         Mood and Affect: Mood normal.       TAINA-7 Total Score: 19 (4/14/2025  3:41 PM)     No data recorded   No data recorded   Assessment & Plan  Anxiety  This 70-year-old female has been having extreme anxiety and fear since she had her heart attack and acute coronary syndrome.  She is currently seeing counseling but is still having daily anxiety and would like to consider treatment options.  We discussed over-the-counter options and I do not believe that any of those options are very effective and valerian actually has some addictive potential and I do not recommend it.  We discussed the option of using BuSpar which is not addictive or habit-forming and is also nonsedating.  I think this would be an excellent choice for her.  I suspect that the anxiety is not permanent and will likely dissipate over the next 4 to 6 months.  We will start with low-dose BuSpar.  If she is not having significant benefit after 2 weeks we will consider increasing the dose  Orders:    busPIRone (Buspar) 5 mg tablet; Take 1 tablet (5 mg) by mouth 2 times a day.    Hyperglycemia    Orders:    POCT glycosylated hemoglobin  (Hb A1C) manually resulted    Coronary artery disease involving native coronary artery of native heart without angina pectoris  Since her last visit she unfortunately had another acute coronary syndrome and has now received 2 stents in total.  She has been hospitalized multiple times and had multiple emergency room trips.  Thankfully her post event echocardiogram showed an ejection fraction of 55% with no wall motion abnormality.  We would expect a significantly good return to normal function.  She is currently being treated with a low-dose Lasix for an episode of pulmonary edema but will likely be able to discontinue this going forward.  I will leave this decision to her cardiology team who is continuing functional evaluation of her heart.  She is doing embolism prophylaxis with Brilinta and aspirin.  Her blood pressure is excellent and her rate is well-controlled.  She is on high intensity statin which will be a lifelong medicine for her.       Type 2 diabetes mellitus without complication, without long-term current use of insulin  This remains very well-controlled with diet alone.       Gastroesophageal reflux disease without esophagitis  Several of her emergency room trips did turn out to be secondary to acid reflux.  She has been following with gastroenterology.  She is currently taking a PPI which is giving her much better symptom control              Please excuse any errors in grammar or translation related to this dictation. Voice recognition software was utilized to prepare this document.

## 2025-04-14 NOTE — ASSESSMENT & PLAN NOTE
Since her last visit she unfortunately had another acute coronary syndrome and has now received 2 stents in total.  She has been hospitalized multiple times and had multiple emergency room trips.  Thankfully her post event echocardiogram showed an ejection fraction of 55% with no wall motion abnormality.  We would expect a significantly good return to normal function.  She is currently being treated with a low-dose Lasix for an episode of pulmonary edema but will likely be able to discontinue this going forward.  I will leave this decision to her cardiology team who is continuing functional evaluation of her heart.  She is doing embolism prophylaxis with Brilinta and aspirin.  Her blood pressure is excellent and her rate is well-controlled.  She is on high intensity statin which will be a lifelong medicine for her.

## 2025-04-23 ENCOUNTER — TELEPHONE (OUTPATIENT)
Dept: PRIMARY CARE | Facility: CLINIC | Age: 70
End: 2025-04-23
Payer: MEDICARE

## 2025-04-23 NOTE — TELEPHONE ENCOUNTER
Pt called, she bought new supplements and wants to know if they are safe to take with her other medications, Cj Leodan's complete B complex, essential once daily vitamin with 5000 U D3, and mild ultra soluble magnesium. Please advise.

## 2025-04-28 ENCOUNTER — TELEPHONE (OUTPATIENT)
Dept: PRIMARY CARE | Facility: CLINIC | Age: 70
End: 2025-04-28
Payer: MEDICARE

## 2025-04-28 NOTE — TELEPHONE ENCOUNTER
Pt's daughter called in and states that her father passed away this Thursday and she needs a doctors note stating that her mother will need to live with her due to medical conditions and not being able  to live alone. Her mother has heart issues and Anxiety.Pt's daughter asked that you please give her a call ASAP to discuss options or if her mother will need an appt to be seen first.   yes

## 2025-04-28 NOTE — LETTER
April 28, 2025     Patient: Willa Fox   YOB: 1955   Date of Visit: 4/28/2025       To Whom It May Concern:    Willa Fox is currently cared for in my clinc. Due to her medical conditions, she can't safely live alone and I recommend that she move in with her daughter for continued care and support.   If you have any questions or concerns, please don't hesitate to call.         Sincerely,         Rafy Ragsdale MD        CC: No Recipients

## 2025-10-06 ENCOUNTER — APPOINTMENT (OUTPATIENT)
Dept: PRIMARY CARE | Facility: CLINIC | Age: 70
End: 2025-10-06
Payer: MEDICARE

## (undated) DEVICE — CATHETER COR DIAG PIGTAILS PIG 145 CRV 5FR 110CM 6 SIDE H

## (undated) DEVICE — Device

## (undated) DEVICE — HI-TORQUE WHISPER MS GUIDE WIRE .014 STRAIGHT TIP 3.0 CM X 190 CM: Brand: HI-TORQUE WHISPER

## (undated) DEVICE — DRAPE SURG BRACH STRL

## (undated) DEVICE — INFLATION DVCBLUE DMND30 ATM / BR20 MLMP802PHD  ME

## (undated) DEVICE — GLOVE ORANGE PI 7 1/2   MSG9075

## (undated) DEVICE — CONTAINER,SPECIMEN,OR STERILE,4OZ: Brand: MEDLINE

## (undated) DEVICE — PINNACLE INTRODUCER SHEATH: Brand: PINNACLE

## (undated) DEVICE — HI-TORQUE BALANCE MIDDLEWEIGHT UNIVERSAL GUIDE WIRE .014 STRAIGHT TIP 3.0 CM X 300 CM: Brand: HI-TORQUE BALANCE MIDDLEWEIGHT UNIVERSAL

## (undated) DEVICE — KIT MED IMAG CNTRST AGNT W/ IOPAMIDOL REUSE

## (undated) DEVICE — KIT ANGIO W/ AT P65 PREM HND CTRL FOR CNTRST DEL ANGIOTOUCH

## (undated) DEVICE — BAND COMPR L24CM REG CLR PLAS HEMSTAT EXT HK AND LOOP RETEN

## (undated) DEVICE — KIT MFLD ISOLATN NACL CNTRST PRT TBNG SPIK W/ PRSS TRNSDUC

## (undated) DEVICE — SHEET,DRAPE,53X77,STERILE: Brand: MEDLINE

## (undated) DEVICE — 4FR MICROPUNCTURE KIT STIFFEN

## (undated) DEVICE — CATHETER ANGIO 5FR L100CM GRY S STL NYL JR4 3 SEG BRAID L

## (undated) DEVICE — CATHETER ANGIO INFIN 038IN AMPLATZ 534543T

## (undated) DEVICE — CATH BLLN ANGIO 3X12MM SC EUPHORA RX

## (undated) DEVICE — INTRODUCER SHTH 0.018 IN 4 FRX70 CM 21 GAX7 CM KT MIC VSI

## (undated) DEVICE — HI-TORQUE BALANCE MIDDLEWEIGHT UNIVERSAL GUIDE WIRE .014 STRAIGHT TIP 3.0 CM X 190 CM: Brand: HI-TORQUE BALANCE MIDDLEWEIGHT UNIVERSAL

## (undated) DEVICE — GLIDESHEATH SLENDER STAINLESS STEEL KIT: Brand: GLIDESHEATH SLENDER

## (undated) DEVICE — ANGIO-SEAL VIP VASCULAR CLOSURE DEVICE: Brand: ANGIO-SEAL

## (undated) DEVICE — GUIDEWIRE VASC L260CM DIA0.035IN TIP L3MM STD EXCHG PTFE J

## (undated) DEVICE — CATHETER DIAG 5FR L100CM LUMN ID0.047IN JL3.5 CRV 0 SIDE H

## (undated) DEVICE — DESTINATION RENAL GUIDING SHEATH: Brand: DESTINATION

## (undated) DEVICE — GLOVE SURG SZ 6 THK91MIL LTX FREE SYN POLYISOPRENE ANTI

## (undated) DEVICE — CATHETER GUID 6FR L100CM DIA0.071IN NYL SHFT EBU3.5

## (undated) DEVICE — CATH BLLN ANGIO 3X12MM NC EUPHORIA RX

## (undated) DEVICE — 3M™ TEGADERM™ TRANSPARENT FILM DRESSING FRAME STYLE, 1626W, 4 IN X 4-3/4 IN (10 CM X 12 CM), 50/CT 4CT/CASE: Brand: 3M™ TEGADERM™

## (undated) DEVICE — GUIDEWIRE VASC .035IN X 260CM ANGL TIP STIFF

## (undated) DEVICE — PML 12 ADULT FLL-MLL PG: Brand: NAMIC